# Patient Record
Sex: MALE | Race: WHITE | NOT HISPANIC OR LATINO | Employment: OTHER | URBAN - METROPOLITAN AREA
[De-identification: names, ages, dates, MRNs, and addresses within clinical notes are randomized per-mention and may not be internally consistent; named-entity substitution may affect disease eponyms.]

---

## 2023-11-22 DIAGNOSIS — N18.30 CHRONIC KIDNEY DISEASE, STAGE 3 UNSPECIFIED (MULTI): Primary | ICD-10-CM

## 2023-11-25 ENCOUNTER — LAB (OUTPATIENT)
Dept: LAB | Facility: LAB | Age: 63
End: 2023-11-25
Payer: COMMERCIAL

## 2023-11-25 DIAGNOSIS — N18.30 CHRONIC KIDNEY DISEASE, STAGE 3 UNSPECIFIED (MULTI): ICD-10-CM

## 2023-11-25 LAB
ALBUMIN SERPL-MCNC: 3.7 G/DL (ref 3.5–5)
ANION GAP SERPL CALC-SCNC: 12 MMOL/L
BASOPHILS # BLD AUTO: 0.09 X10*3/UL (ref 0–0.1)
BASOPHILS NFR BLD AUTO: 1.3 %
BUN SERPL-MCNC: 51 MG/DL (ref 8–25)
CALCIUM SERPL-MCNC: 9.1 MG/DL (ref 8.5–10.4)
CHLORIDE SERPL-SCNC: 110 MMOL/L (ref 97–107)
CO2 SERPL-SCNC: 22 MMOL/L (ref 24–31)
CREAT SERPL-MCNC: 1.8 MG/DL (ref 0.4–1.6)
EOSINOPHIL # BLD AUTO: 0.19 X10*3/UL (ref 0–0.7)
EOSINOPHIL NFR BLD AUTO: 2.7 %
ERYTHROCYTE [DISTWIDTH] IN BLOOD BY AUTOMATED COUNT: 14.1 % (ref 11.5–14.5)
GFR SERPL CREATININE-BSD FRML MDRD: 42 ML/MIN/1.73M*2
GLUCOSE SERPL-MCNC: 115 MG/DL (ref 65–99)
HCT VFR BLD AUTO: 43 % (ref 41–52)
HGB BLD-MCNC: 13.6 G/DL (ref 13.5–17.5)
IMM GRANULOCYTES # BLD AUTO: 0.19 X10*3/UL (ref 0–0.7)
IMM GRANULOCYTES NFR BLD AUTO: 2.7 % (ref 0–0.9)
LYMPHOCYTES # BLD AUTO: 1.39 X10*3/UL (ref 1.2–4.8)
LYMPHOCYTES NFR BLD AUTO: 19.6 %
MCH RBC QN AUTO: 29.4 PG (ref 26–34)
MCHC RBC AUTO-ENTMCNC: 31.6 G/DL (ref 32–36)
MCV RBC AUTO: 93 FL (ref 80–100)
MONOCYTES # BLD AUTO: 0.56 X10*3/UL (ref 0.1–1)
MONOCYTES NFR BLD AUTO: 7.9 %
NEUTROPHILS # BLD AUTO: 4.66 X10*3/UL (ref 1.2–7.7)
NEUTROPHILS NFR BLD AUTO: 65.8 %
NRBC BLD-RTO: 0 /100 WBCS (ref 0–0)
PHOSPHATE SERPL-MCNC: 3.5 MG/DL (ref 2.5–4.5)
PLATELET # BLD AUTO: 209 X10*3/UL (ref 150–450)
POTASSIUM SERPL-SCNC: 5.1 MMOL/L (ref 3.4–5.1)
RBC # BLD AUTO: 4.62 X10*6/UL (ref 4.5–5.9)
SODIUM SERPL-SCNC: 144 MMOL/L (ref 133–145)
WBC # BLD AUTO: 7.1 X10*3/UL (ref 4.4–11.3)

## 2023-11-25 PROCEDURE — 80069 RENAL FUNCTION PANEL: CPT

## 2023-11-25 PROCEDURE — 83970 ASSAY OF PARATHORMONE: CPT

## 2023-11-25 PROCEDURE — 85025 COMPLETE CBC W/AUTO DIFF WBC: CPT

## 2023-11-25 PROCEDURE — 36415 COLL VENOUS BLD VENIPUNCTURE: CPT

## 2023-11-26 LAB — PTH-INTACT SERPL-MCNC: 117.2 PG/ML (ref 18.5–88)

## 2024-06-26 ENCOUNTER — APPOINTMENT (OUTPATIENT)
Dept: DERMATOLOGY | Facility: CLINIC | Age: 64
End: 2024-06-26
Payer: COMMERCIAL

## 2024-06-26 DIAGNOSIS — L81.4 LENTIGO: ICD-10-CM

## 2024-06-26 DIAGNOSIS — L82.1 SEBORRHEIC KERATOSIS: ICD-10-CM

## 2024-06-26 DIAGNOSIS — D48.5 NEOPLASM OF UNCERTAIN BEHAVIOR OF SKIN: Primary | ICD-10-CM

## 2024-06-26 DIAGNOSIS — L57.9 SKIN CHANGES DUE TO CHRONIC EXPOSURE TO NONIONIZING RADIATION: ICD-10-CM

## 2024-06-26 DIAGNOSIS — D22.9 BENIGN NEVUS: ICD-10-CM

## 2024-06-26 DIAGNOSIS — L73.8 SEBACEOUS HYPERPLASIA OF FACE: ICD-10-CM

## 2024-06-26 PROCEDURE — 11102 TANGNTL BX SKIN SINGLE LES: CPT | Performed by: NURSE PRACTITIONER

## 2024-06-26 PROCEDURE — 99213 OFFICE O/P EST LOW 20 MIN: CPT | Performed by: NURSE PRACTITIONER

## 2024-06-26 PROCEDURE — 1036F TOBACCO NON-USER: CPT | Performed by: NURSE PRACTITIONER

## 2024-06-26 RX ORDER — CARVEDILOL 6.25 MG/1
6.25 TABLET ORAL
COMMUNITY

## 2024-06-26 RX ORDER — FUROSEMIDE 40 MG/1
1 TABLET ORAL
COMMUNITY
Start: 2024-04-20

## 2024-06-26 RX ORDER — PREDNISONE 20 MG/1
TABLET ORAL
COMMUNITY
Start: 2021-09-28

## 2024-06-26 RX ORDER — NAPROXEN SODIUM 220 MG/1
TABLET, FILM COATED ORAL EVERY 24 HOURS
COMMUNITY

## 2024-06-26 RX ORDER — ALBUTEROL SULFATE 90 UG/1
2 AEROSOL, METERED RESPIRATORY (INHALATION) EVERY 4 HOURS PRN
COMMUNITY

## 2024-06-26 RX ORDER — KETOCONAZOLE 20 MG/G
CREAM TOPICAL
COMMUNITY
Start: 2021-05-03

## 2024-06-26 RX ORDER — POLYETHYLENE GLYCOL 3350 17 G/17G
17 POWDER, FOR SOLUTION ORAL
COMMUNITY
Start: 2023-02-28

## 2024-06-26 RX ORDER — TRIAMCINOLONE ACETONIDE 1 MG/G
CREAM TOPICAL
COMMUNITY
Start: 2022-12-20

## 2024-06-26 RX ORDER — BUDESONIDE AND FORMOTEROL FUMARATE 160; 4.5 UG/1; UG/1
2 AEROSOL, METERED RESPIRATORY (INHALATION) 2 TIMES DAILY
COMMUNITY
Start: 2024-05-31

## 2024-06-26 RX ORDER — SIMVASTATIN 40 MG/1
TABLET, FILM COATED ORAL EVERY 24 HOURS
COMMUNITY

## 2024-06-26 RX ORDER — MOMETASONE FUROATE AND FORMOTEROL FUMARATE DIHYDRATE 200; 5 UG/1; UG/1
2 AEROSOL RESPIRATORY (INHALATION) 2 TIMES DAILY
COMMUNITY
Start: 2023-10-29

## 2024-06-26 RX ORDER — TERBINAFINE HYDROCHLORIDE 250 MG/1
TABLET ORAL
COMMUNITY
Start: 2021-05-03

## 2024-06-26 NOTE — PROGRESS NOTES
Subjective     Colten Eason is a 63 y.o. male who presents for the following: Skin Check.     Review of Systems:  No other skin or systemic complaints other than what is documented elsewhere in the note.    The following portions of the chart were reviewed this encounter and updated as appropriate:   Tobacco  Allergies  Meds  Problems  Med Hx  Surg Hx         Skin Cancer History  No skin cancer on file.      Specialty Problems    None       Objective   Well appearing patient in no apparent distress; mood and affect are within normal limits.    A focused skin examination was performed neck up. All findings within normal limits unless otherwise noted below.    Assessment/Plan   1. Neoplasm of uncertain behavior of skin  Right Parotid Area  6 mm erythematous crusty papule              Lesion biopsy  Type of biopsy: tangential    Informed consent: discussed and consent obtained    Timeout: patient name, date of birth, surgical site, and procedure verified    Procedure prep:  Patient was prepped and draped  Anesthesia: the lesion was anesthetized in a standard fashion    Anesthetic:  1% lidocaine plain local infiltration  Instrument used: DermaBlade    Hemostasis achieved with: electrodesiccation    Outcome: patient tolerated procedure well    Post-procedure details: wound care instructions given    Additional details:  Cleaned area with isopropyl alcohol prior to anesthesia or biopsy. Applied thin layer of vaseline and covered with bandaid after procedure      Staff Communication: Dermatology Local Anesthesia: 1 % Plain Lidocaine - Amount: 0.5 ml    Specimen 1 - Dermatopathology- DERM LAB  Differential Diagnosis: NMSC vs ISK  Check Margins Yes/No?:    Comments:    Dermpath Lab: Routine Histopathology (formalin-fixed tissue)    NUB  - Given uncertainty in clinical diagnosis, shave biopsy is recommended in clinic today.  - The patient expressed understanding, is in agreement with this plan, and wishes to proceed  with biopsy.  - Oral and written wound care instructions provided.  - Advised patient that the office will call within 2 weeks to discuss biopsy results.      2. Benign nevus  Scattered, uniform and benign-appearing, regular brown melanocytic papules and macules.    The present appearance of the lesion is not worrisome but it should continue to be observed and testing/treatment may be warranted if change occurs.    3. Seborrheic keratosis  Stuck on verrucous, tan-brown papules and plaques.      Seborrheic keratoses are common noncancerous (benign) growths of unknown cause seen in adults due to a thickening of an area of the top skin layer. Seborrheic keratoses may appear as if they are stuck on to the skin. They have distinct borders, and they may appear as papules (small, solid bumps) or plaques (solid, raised patches that are bigger than a thumbnail). They may be the same color as your skin, or they may be pink, light brown, darker brown, or very dark brown, or sometimes may appear black.    There is no way to prevent new seborrheic keratoses from forming. Seborrheic keratoses can be removed, but removal is considered a cosmetic issue and is usually not covered by insurance.    PLAN  No treatment is needed unless there is irritation from clothing, such as itching or bleeding.  2.   Some lotions containing alpha hydroxy acids, salicylic acid, or urea may make the areas feel smoother with regular use but will not eliminate them.    4. Sebaceous hyperplasia of face  Head - Anterior (Face)  Small yellow, lobulated papules with a central dell.    Sebaceous hyperplasia is a common harmless enlargement of the skin oil glands.    Many types of treatment can remove the lesions, with a small risk of leaving scars:  Burning (cautery)  Freezing (cryosurgery)  Applying topical chemicals  Applying a drug activated by light (photodynamic therapy)  Laser treatment  Cutting out the lesions (excision)    The present appearance of  the lesion is not worrisome but it should continue to be observed and testing/treatment may be warranted if change occurs.    5. Lentigo  Scattered tan macules in sun-exposed areas.    A solar lentigo (plural, solar lentigines), also known as a sun-induced freckle or senile lentigo, is a dark (hyperpigmented) lesion caused by natural or artificial ultraviolet (UV) light. Solar lentigines may be single or multiple. This type of lentigo is different from a simple lentigo (lentigo simplex) because it is caused by exposure to UV light. Solar lentigines are benign, but they do indicate excessive sun exposure, a risk factor for the development of skin cancer.    To prevent solar lentigines, avoid exposure to sunlight in midday (10 AM to 3 PM), wear sun-protective clothing (tightly woven clothes and hats), and apply sunscreen (SPF 30 UVA and UVB block).    The present appearance of the lesion is not worrisome but it should continue to be observed and testing/treatment may be warranted if change occurs.    6. Skin changes due to chronic exposure to nonionizing radiation  Actinic changes in the form of freckles, lentigines and hyper/hypopigmentation     ABCDEs of melanoma and atypical moles were discussed with the patient.    Patient was instructed to perform monthly self skin examination.  We recommended that the patient have regular full skin exams given an increased risk of subsequent skin cancers.    The patient was instructed to use sun protective behaviors including use of broad spectrum sunscreens and sun protective clothing to reduce risk of skin cancers.    Warning signs of non-melanoma skin cancer discussed.        Return to clinic to be determined. Will call with results

## 2024-06-26 NOTE — PATIENT INSTRUCTIONS

## 2024-06-28 ENCOUNTER — TELEPHONE (OUTPATIENT)
Dept: DERMATOLOGY | Facility: CLINIC | Age: 64
End: 2024-06-28
Payer: COMMERCIAL

## 2024-06-28 LAB
LABORATORY COMMENT REPORT: NORMAL
PATH REPORT.FINAL DX SPEC: NORMAL
PATH REPORT.GROSS SPEC: NORMAL
PATH REPORT.MICROSCOPIC SPEC OTHER STN: NORMAL
PATH REPORT.RELEVANT HX SPEC: NORMAL
PATH REPORT.TOTAL CANCER: NORMAL

## 2024-06-28 NOTE — TELEPHONE ENCOUNTER
Benign. No further treatment required. Return to clinic in 1 year for routine skin check or sooner if needed.

## 2024-08-22 DIAGNOSIS — N18.32 CHRONIC KIDNEY DISEASE, STAGE 3B (MULTI): Primary | ICD-10-CM

## 2024-08-24 ENCOUNTER — LAB (OUTPATIENT)
Dept: LAB | Facility: LAB | Age: 64
End: 2024-08-24
Payer: COMMERCIAL

## 2024-08-24 DIAGNOSIS — N18.32 CHRONIC KIDNEY DISEASE, STAGE 3B (MULTI): ICD-10-CM

## 2024-08-24 LAB
ALBUMIN SERPL-MCNC: 3.8 G/DL (ref 3.5–5)
ANION GAP SERPL CALC-SCNC: 13 MMOL/L
BUN SERPL-MCNC: 63 MG/DL (ref 8–25)
CALCIUM SERPL-MCNC: 9.3 MG/DL (ref 8.5–10.4)
CHLORIDE SERPL-SCNC: 107 MMOL/L (ref 97–107)
CO2 SERPL-SCNC: 22 MMOL/L (ref 24–31)
CREAT SERPL-MCNC: 2.1 MG/DL (ref 0.4–1.6)
EGFRCR SERPLBLD CKD-EPI 2021: 35 ML/MIN/1.73M*2
ERYTHROCYTE [DISTWIDTH] IN BLOOD BY AUTOMATED COUNT: 13.8 % (ref 11.5–14.5)
GLUCOSE SERPL-MCNC: 140 MG/DL (ref 65–99)
HCT VFR BLD AUTO: 42 % (ref 41–52)
HGB BLD-MCNC: 13.3 G/DL (ref 13.5–17.5)
MCH RBC QN AUTO: 29.2 PG (ref 26–34)
MCHC RBC AUTO-ENTMCNC: 31.7 G/DL (ref 32–36)
MCV RBC AUTO: 92 FL (ref 80–100)
NRBC BLD-RTO: 0 /100 WBCS (ref 0–0)
PHOSPHATE SERPL-MCNC: 3.2 MG/DL (ref 2.5–4.5)
PLATELET # BLD AUTO: 200 X10*3/UL (ref 150–450)
POTASSIUM SERPL-SCNC: 4.2 MMOL/L (ref 3.4–5.1)
PTH-INTACT SERPL-MCNC: 126.5 PG/ML (ref 18.5–88)
RBC # BLD AUTO: 4.55 X10*6/UL (ref 4.5–5.9)
SODIUM SERPL-SCNC: 142 MMOL/L (ref 133–145)
WBC # BLD AUTO: 6.4 X10*3/UL (ref 4.4–11.3)

## 2024-08-24 PROCEDURE — 83970 ASSAY OF PARATHORMONE: CPT

## 2024-08-24 PROCEDURE — 36415 COLL VENOUS BLD VENIPUNCTURE: CPT

## 2024-08-24 PROCEDURE — 85027 COMPLETE CBC AUTOMATED: CPT

## 2024-08-24 PROCEDURE — 80069 RENAL FUNCTION PANEL: CPT

## 2025-02-11 ENCOUNTER — OFFICE VISIT (OUTPATIENT)
Dept: URGENT CARE | Age: 65
End: 2025-02-11
Payer: COMMERCIAL

## 2025-02-11 DIAGNOSIS — J40 BRONCHITIS: Primary | ICD-10-CM

## 2025-02-11 RX ORDER — AZITHROMYCIN 250 MG/1
TABLET, FILM COATED ORAL
Qty: 6 TABLET | Refills: 0 | Status: SHIPPED | OUTPATIENT
Start: 2025-02-11

## 2025-02-11 RX ORDER — PREDNISONE 20 MG/1
20 TABLET ORAL DAILY
Qty: 10 TABLET | Refills: 0 | Status: SHIPPED | OUTPATIENT
Start: 2025-02-11 | End: 2025-02-21

## 2025-02-12 NOTE — PATIENT INSTRUCTIONS
Antibiotics and cough tablets were called into pharmacy take as directed. Follow-up with primary care doctor on an outpatient basis. If you are not getting any better after the medications you may need a chest x-ray call your primary care doctor to have one scheduled. Any worsening symptoms such as chest pain or shortness of breath go directly to the ER.

## 2025-02-12 NOTE — PROGRESS NOTES
Urgent Care Virtual Video Visit    Patient Location: [facility]  Provider Location: [facility]    Video visit completed with realtime synchronous video/audio connection. Informed consent was obtained from the patient. Patient was made aware that my evaluation and diagnosis are limited due to the fact that we are not in the same room during the interview and that this is a virtual encounter that took place via videoconferencing. Patient verbalized understanding.     HPI:  Pt is a 64 yr old male who presents with cough and congestion since Saturday he has COPD and he usually runs 95% pulse ox but is around 93%.  He denies sob or chest pain.  He has a lung specialist but couldn't get into  see him.  He was neg covid at home    ALLERGIES / ADVERSE REACTIONS:  Allergies: No known allergies noted by patient.  MEDICATIONS:  Current Medications: inhalers, BP meds  PROBLEMS:  Major or Chronic Illnesses: COPD, HTN  Family Hx: none pertinent to this visit  Social Hx: no smoking, no drug use, no alcohol     ROS:  Gen: No fatigue, No fever, sweats.  Head: No headache, trauma.  Eyes: No vision loss, double vision, drainage, eye pain.  ENT: No hearing changes, pain, epistaxis, No congestion  Cardiac: No chest pain  Pulmonary: No shortness of breath, no pleuritic pain, + cough  Heme/lymph: No swollen glands  GI: No abdominal pain, nausea, no vomiting, diarrhea  : No dysuria, frequency, urgency, hematuria  Musculoskeletal: No limb pain, joint pain, back pain, joint swelling or stiffness. No myalgia  Skin: No rashes, pruritus, lumps, lesions.  Neuro: No Numbness, tingling, or weakness.  Psych: No anxiety     Review of systems is otherwise negative unless stated above or in history of present illness.     Physical exam:   (limited due to virtual visit)  General:  Pt is alert, no acute distress  HENMT: PERRL, EOMs intact, Conjunctiva pink with no erythema or exudates. No rhinorrhea and rhinitis  Resp: Respiratory  effort is normal, no retractions, no stridor. No cough  Skel: full range of motion of upper and lower extremities.  Neuro: Normal gait, CN II-XII intact, no motor or sensory changes.  Psych: Alert and oriented ×3, judgment is appropriate, normal mood and affect     MDM:     History and physical exam consistent with bronchitis. No evidence of pneumonia, sepsis or other acute cardiopulmonary process.  No X-ray, labs, or further work-up warranted at this time.  Pt will be treated with abx and prednisone, and Mucinex DM due to persistent cough and smoking status.  Return to the office with any new or worsening symptoms.  Patient agreeable with plan and verbalized understanding.      DX:  bronchitis  Treatment: z-pack/prednisone  Discharge instructions: Antibiotics and steroids were called into pharmacy take as directed. Follow-up with primary care doctor on an outpatient basis. If you are not getting any better after the medications you may need a chest x-ray call your primary care doctor to have one scheduled. Any worsening symptoms such as chest pain or shortness of breath go directly to the ER.  Patient disposition: Home    Electronically signed by PETRONA Dubose  7:11 PM

## 2025-02-16 ENCOUNTER — APPOINTMENT (OUTPATIENT)
Dept: CARDIOLOGY | Facility: HOSPITAL | Age: 65
End: 2025-02-16
Payer: COMMERCIAL

## 2025-02-16 ENCOUNTER — APPOINTMENT (OUTPATIENT)
Dept: URGENT CARE | Age: 65
End: 2025-02-16
Payer: COMMERCIAL

## 2025-02-16 ENCOUNTER — APPOINTMENT (OUTPATIENT)
Dept: RADIOLOGY | Facility: HOSPITAL | Age: 65
End: 2025-02-16
Payer: COMMERCIAL

## 2025-02-16 ENCOUNTER — HOSPITAL ENCOUNTER (EMERGENCY)
Facility: HOSPITAL | Age: 65
Discharge: HOME | End: 2025-02-16
Attending: EMERGENCY MEDICINE
Payer: COMMERCIAL

## 2025-02-16 VITALS
TEMPERATURE: 97.8 F | HEART RATE: 65 BPM | OXYGEN SATURATION: 92 % | RESPIRATION RATE: 16 BRPM | SYSTOLIC BLOOD PRESSURE: 215 MMHG | BODY MASS INDEX: 43.35 KG/M2 | HEIGHT: 68 IN | WEIGHT: 286 LBS | DIASTOLIC BLOOD PRESSURE: 116 MMHG

## 2025-02-16 DIAGNOSIS — J44.1 COPD EXACERBATION (MULTI): Primary | ICD-10-CM

## 2025-02-16 LAB
ANION GAP SERPL CALC-SCNC: 10 MMOL/L (ref 10–20)
BASOPHILS # BLD AUTO: 0.04 X10*3/UL (ref 0–0.1)
BASOPHILS NFR BLD AUTO: 0.5 %
BUN SERPL-MCNC: 45 MG/DL (ref 6–23)
CALCIUM SERPL-MCNC: 8.6 MG/DL (ref 8.6–10.3)
CHLORIDE SERPL-SCNC: 113 MMOL/L (ref 98–107)
CO2 SERPL-SCNC: 28 MMOL/L (ref 21–32)
CREAT SERPL-MCNC: 2.04 MG/DL (ref 0.5–1.3)
EGFRCR SERPLBLD CKD-EPI 2021: 36 ML/MIN/1.73M*2
EOSINOPHIL # BLD AUTO: 0.02 X10*3/UL (ref 0–0.7)
EOSINOPHIL NFR BLD AUTO: 0.3 %
ERYTHROCYTE [DISTWIDTH] IN BLOOD BY AUTOMATED COUNT: 13.8 % (ref 11.5–14.5)
FLUAV RNA RESP QL NAA+PROBE: NOT DETECTED
FLUBV RNA RESP QL NAA+PROBE: NOT DETECTED
GLUCOSE SERPL-MCNC: 117 MG/DL (ref 74–99)
HCT VFR BLD AUTO: 41.5 % (ref 41–52)
HGB BLD-MCNC: 13.7 G/DL (ref 13.5–17.5)
IMM GRANULOCYTES # BLD AUTO: 0.29 X10*3/UL (ref 0–0.7)
IMM GRANULOCYTES NFR BLD AUTO: 3.7 % (ref 0–0.9)
LYMPHOCYTES # BLD AUTO: 0.82 X10*3/UL (ref 1.2–4.8)
LYMPHOCYTES NFR BLD AUTO: 10.4 %
MCH RBC QN AUTO: 29.9 PG (ref 26–34)
MCHC RBC AUTO-ENTMCNC: 33 G/DL (ref 32–36)
MCV RBC AUTO: 91 FL (ref 80–100)
MONOCYTES # BLD AUTO: 0.37 X10*3/UL (ref 0.1–1)
MONOCYTES NFR BLD AUTO: 4.7 %
NEUTROPHILS # BLD AUTO: 6.32 X10*3/UL (ref 1.2–7.7)
NEUTROPHILS NFR BLD AUTO: 80.4 %
NRBC BLD-RTO: 0 /100 WBCS (ref 0–0)
PLATELET # BLD AUTO: 205 X10*3/UL (ref 150–450)
POTASSIUM SERPL-SCNC: 4.8 MMOL/L (ref 3.5–5.3)
RBC # BLD AUTO: 4.58 X10*6/UL (ref 4.5–5.9)
SARS-COV-2 RNA RESP QL NAA+PROBE: NOT DETECTED
SODIUM SERPL-SCNC: 146 MMOL/L (ref 136–145)
WBC # BLD AUTO: 7.9 X10*3/UL (ref 4.4–11.3)

## 2025-02-16 PROCEDURE — 2500000001 HC RX 250 WO HCPCS SELF ADMINISTERED DRUGS (ALT 637 FOR MEDICARE OP): Performed by: EMERGENCY MEDICINE

## 2025-02-16 PROCEDURE — 85025 COMPLETE CBC W/AUTO DIFF WBC: CPT | Performed by: STUDENT IN AN ORGANIZED HEALTH CARE EDUCATION/TRAINING PROGRAM

## 2025-02-16 PROCEDURE — 2500000004 HC RX 250 GENERAL PHARMACY W/ HCPCS (ALT 636 FOR OP/ED): Performed by: EMERGENCY MEDICINE

## 2025-02-16 PROCEDURE — 93005 ELECTROCARDIOGRAM TRACING: CPT

## 2025-02-16 PROCEDURE — 71046 X-RAY EXAM CHEST 2 VIEWS: CPT | Performed by: RADIOLOGY

## 2025-02-16 PROCEDURE — 71250 CT THORAX DX C-: CPT | Performed by: RADIOLOGY

## 2025-02-16 PROCEDURE — 2500000002 HC RX 250 W HCPCS SELF ADMINISTERED DRUGS (ALT 637 FOR MEDICARE OP, ALT 636 FOR OP/ED): Performed by: EMERGENCY MEDICINE

## 2025-02-16 PROCEDURE — 71250 CT THORAX DX C-: CPT

## 2025-02-16 PROCEDURE — 87636 SARSCOV2 & INF A&B AMP PRB: CPT | Performed by: EMERGENCY MEDICINE

## 2025-02-16 PROCEDURE — 85025 COMPLETE CBC W/AUTO DIFF WBC: CPT | Performed by: EMERGENCY MEDICINE

## 2025-02-16 PROCEDURE — 36415 COLL VENOUS BLD VENIPUNCTURE: CPT | Performed by: STUDENT IN AN ORGANIZED HEALTH CARE EDUCATION/TRAINING PROGRAM

## 2025-02-16 PROCEDURE — 80048 BASIC METABOLIC PNL TOTAL CA: CPT | Performed by: EMERGENCY MEDICINE

## 2025-02-16 PROCEDURE — 99285 EMERGENCY DEPT VISIT HI MDM: CPT | Mod: 25 | Performed by: EMERGENCY MEDICINE

## 2025-02-16 PROCEDURE — 80048 BASIC METABOLIC PNL TOTAL CA: CPT | Performed by: STUDENT IN AN ORGANIZED HEALTH CARE EDUCATION/TRAINING PROGRAM

## 2025-02-16 PROCEDURE — 71046 X-RAY EXAM CHEST 2 VIEWS: CPT

## 2025-02-16 PROCEDURE — 94640 AIRWAY INHALATION TREATMENT: CPT

## 2025-02-16 RX ORDER — DOXYCYCLINE HYCLATE 100 MG
100 TABLET ORAL ONCE
Status: COMPLETED | OUTPATIENT
Start: 2025-02-16 | End: 2025-02-16

## 2025-02-16 RX ORDER — PREDNISONE 20 MG/1
60 TABLET ORAL DAILY
Qty: 12 TABLET | Refills: 0 | Status: SHIPPED | OUTPATIENT
Start: 2025-02-16 | End: 2025-02-20

## 2025-02-16 RX ORDER — PREDNISONE 20 MG/1
40 TABLET ORAL ONCE
Status: COMPLETED | OUTPATIENT
Start: 2025-02-16 | End: 2025-02-16

## 2025-02-16 RX ORDER — IPRATROPIUM BROMIDE AND ALBUTEROL SULFATE 2.5; .5 MG/3ML; MG/3ML
3 SOLUTION RESPIRATORY (INHALATION) EVERY 20 MIN
Status: DISPENSED | OUTPATIENT
Start: 2025-02-16 | End: 2025-02-16

## 2025-02-16 RX ORDER — DOXYCYCLINE 100 MG/1
100 TABLET ORAL 2 TIMES DAILY
Qty: 10 TABLET | Refills: 0 | Status: SHIPPED | OUTPATIENT
Start: 2025-02-16 | End: 2025-02-21

## 2025-02-16 RX ORDER — ALBUTEROL SULFATE 90 UG/1
2 INHALANT RESPIRATORY (INHALATION) EVERY 6 HOURS PRN
Qty: 8 G | Refills: 0 | Status: SHIPPED | OUTPATIENT
Start: 2025-02-16

## 2025-02-16 RX ADMIN — IPRATROPIUM BROMIDE AND ALBUTEROL SULFATE 3 ML: 2.5; .5 SOLUTION RESPIRATORY (INHALATION) at 20:20

## 2025-02-16 RX ADMIN — IPRATROPIUM BROMIDE AND ALBUTEROL SULFATE 3 ML: 2.5; .5 SOLUTION RESPIRATORY (INHALATION) at 20:28

## 2025-02-16 RX ADMIN — PREDNISONE 40 MG: 20 TABLET ORAL at 20:18

## 2025-02-16 RX ADMIN — DOXYCYCLINE HYCLATE 100 MG: 100 TABLET, COATED ORAL at 23:20

## 2025-02-16 ASSESSMENT — PAIN DESCRIPTION - PROGRESSION: CLINICAL_PROGRESSION: NOT CHANGED

## 2025-02-16 ASSESSMENT — COLUMBIA-SUICIDE SEVERITY RATING SCALE - C-SSRS
2. HAVE YOU ACTUALLY HAD ANY THOUGHTS OF KILLING YOURSELF?: NO
1. IN THE PAST MONTH, HAVE YOU WISHED YOU WERE DEAD OR WISHED YOU COULD GO TO SLEEP AND NOT WAKE UP?: NO
6. HAVE YOU EVER DONE ANYTHING, STARTED TO DO ANYTHING, OR PREPARED TO DO ANYTHING TO END YOUR LIFE?: NO

## 2025-02-16 ASSESSMENT — PAIN - FUNCTIONAL ASSESSMENT: PAIN_FUNCTIONAL_ASSESSMENT: 0-10

## 2025-02-16 ASSESSMENT — PAIN SCALES - GENERAL: PAINLEVEL_OUTOF10: 0 - NO PAIN

## 2025-02-16 NOTE — ED TRIAGE NOTES
Patient presents to ED from home for SOB and congestion that started 2 weeks ago. Patient was given steroids and a z-pack with no relief from UC on Monday.

## 2025-02-17 LAB
ATRIAL RATE: 63 BPM
P AXIS: 44 DEGREES
P OFFSET: 174 MS
P ONSET: 118 MS
PR INTERVAL: 160 MS
Q ONSET: 198 MS
QRS COUNT: 11 BEATS
QRS DURATION: 126 MS
QT INTERVAL: 422 MS
QTC CALCULATION(BAZETT): 431 MS
QTC FREDERICIA: 429 MS
R AXIS: -60 DEGREES
T AXIS: 41 DEGREES
T OFFSET: 409 MS
VENTRICULAR RATE: 63 BPM

## 2025-02-17 NOTE — DISCHARGE INSTRUCTIONS
You are seen in the emergency department for shortness of breath and cough despite taking a Z-Tim and steroids at home.  Your symptoms are likely due to a COPD exacerbation. This may be from taking a lower dose of steroids at home. Your CT scan did not show any signs of pneumonia. We will switch you to a different antibiotic for your COPD and also increase your steroid dose to 60mg per day. Monitor symptoms closely.  Follow-up with your primary doctor and pulmonologist.  Return to the emergency department if you have worsening shortness of breath, fever, chills, chest pain, or any other concerning symptoms.

## 2025-02-17 NOTE — ED PROVIDER NOTES
Emergency Department Provider Note             History of Present Illness   CC: Shortness of Breath    History provided by: Patient  Limitations to History: None  External Records Reviewed: prior ED provider notes, clinic notes, discharge summaries    HPI:  Colten Eason is a 64 y.o. male with history including COPD presenting to the emergency department for shortness of breath, cough, chest congestion for the past week. States he was seen at urgent care and prescribed a z-juhi and prednisone which have not really helped. Denies fever, chills, hemoptysis, leg swelling, chest pain, GI symptoms.     Physical Exam   Triage vitals:  T 36.8 °C (98.2 °F)  HR 69  BP (!) 151/107  RR 20  O2 94 % None (Room air)    General: awake, well-appearing, no distress  Head: normocephalic, atraumatic  Eyes: pupils equal, extraocular movements grossly intact, no conjunctival injection or scleral icterus  ENT: nares patent, moist mucous membranes  Neck: supple, trachea midline, no masses  CV: regular rate and rhythm, well-perfused  Resp: diminished breath sounds with faint end expiratory wheezing bilaterally. No increased work of breathing.  GI: soft, non-distended, non-tender, no rebound or guarding  Extremities: no edema, no gross deformity   Neuro: alert, oriented, speech is fluent, face is symmetric, moving all extremities   Psych: Appropriate mood and affect    ED Course & Medical Decision Making     64 y.o. male with history including COPD presenting to the emergency department for shortness of breath, cough, chest congestion for the past week, not improved with a z-juhi and prednisone. He's not in any distress and speaking in full sentences but has diminished breath sounds with expiratory wheezing. He appears euvolemic. He was given duonebs and 40mg prednisone. Is on 20mg daily at home. Workup initiated, see ED course.     Social Determinants Limiting Care: None identified    EKG: See ED course.     Results: Independently  reviewed and interpreted by me. Please see ED course and [facility] for my full interpretation.     Chronic Medical Conditions Significantly Affecting Care: as per [facility]    Patient was discussed with the following consultants/services: None     Care Considerations: as per [facility]    ED Course as of 02/20/25 0722   Sun Feb 16, 2025   1934 Orders initiated in triage. The patient was moved back to the main ED at this time, at which point I assumed his care.     Labs are notable for mild hypernatremia/hyperchloremia. Relatively stable creatinine. I independently reviewed his chest x-ray which shows no acute process. Viral swabs are negative. Given his ongoing symptoms despite his inhaler, steroids, and zpak, will obtain CT to rule out an occult pneumonia.  [LM]   2252 EKG per my interpretation reveals normal sinus rhythm, rate 63, left axis deviation, nonspecific intraventricular block, no significant ischemic changes. This is similar to prior EKG.    CT shows no signs of pneumonia or other acute process. [LM]   2252 Results were discussed with the patient and his wife.  Of note, he is hypertensive but appears to be asymptomatic.  States he is on carvedilol and another blood pressure medication but did not take them this evening. He declined IV fluids for possible element of dehydration. Also declined additional breathing treatments. Lungs have improved air movement. He states he feels comfortable going home.  He was given prescriptions for albuterol, a higher dose of prednisone for 4 days, and doxycycline for suspected COPD exacerbation. Given return precautions and advised follow up with his PCP and pulmonologist.  [LM]      ED Course User Index  [LM] Maya Williamson MD         Diagnoses as of 02/20/25 0722   COPD exacerbation (Multi)       Disposition   Discharge    MD Maya Hinds MD  02/20/25 0722

## 2025-04-09 ENCOUNTER — APPOINTMENT (OUTPATIENT)
Dept: RADIOLOGY | Facility: HOSPITAL | Age: 65
DRG: 605 | End: 2025-04-09
Payer: COMMERCIAL

## 2025-04-09 ENCOUNTER — HOSPITAL ENCOUNTER (INPATIENT)
Facility: HOSPITAL | Age: 65
End: 2025-04-09
Attending: STUDENT IN AN ORGANIZED HEALTH CARE EDUCATION/TRAINING PROGRAM | Admitting: SURGERY
Payer: COMMERCIAL

## 2025-04-09 ENCOUNTER — APPOINTMENT (OUTPATIENT)
Dept: RADIOLOGY | Facility: HOSPITAL | Age: 65
End: 2025-04-09
Payer: COMMERCIAL

## 2025-04-09 DIAGNOSIS — S80.11XA HEMATOMA OF RIGHT LOWER EXTREMITY, INITIAL ENCOUNTER: ICD-10-CM

## 2025-04-09 DIAGNOSIS — N18.31 STAGE 3A CHRONIC KIDNEY DISEASE (MULTI): ICD-10-CM

## 2025-04-09 DIAGNOSIS — T14.8XXA HEMATOMA: ICD-10-CM
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
## 2025-04-09 LAB
ALBUMIN SERPL BCP-MCNC: 3.2 G/DL (ref 3.4–5)
ALP SERPL-CCNC: 47 U/L (ref 33–136)
ALT SERPL W P-5'-P-CCNC: 18 U/L (ref 10–52)
ANION GAP SERPL CALCULATED.3IONS-SCNC: 10 MMOL/L (ref 10–20)
APPEARANCE UR: CLEAR
APTT PPP: 24.1 SECONDS (ref 22–32.5)
AST SERPL W P-5'-P-CCNC: 13 U/L (ref 9–39)
BASOPHILS # BLD AUTO: 0.05 X10*3/UL (ref 0–0.1)
BASOPHILS NFR BLD AUTO: 0.6 %
BILIRUB SERPL-MCNC: 0.3 MG/DL (ref 0–1.2)
BILIRUB UR STRIP.AUTO-MCNC: NEGATIVE MG/DL
BUN SERPL-MCNC: 55 MG/DL (ref 6–23)
CALCIUM SERPL-MCNC: 8.2 MG/DL (ref 8.6–10.3)
CHLORIDE SERPL-SCNC: 111 MMOL/L (ref 98–107)
CO2 SERPL-SCNC: 24 MMOL/L (ref 21–32)
COLOR UR: COLORLESS
CREAT SERPL-MCNC: 2 MG/DL (ref 0.5–1.3)
EGFRCR SERPLBLD CKD-EPI 2021: 37 ML/MIN/1.73M*2
EOSINOPHIL # BLD AUTO: 0.07 X10*3/UL (ref 0–0.7)
EOSINOPHIL NFR BLD AUTO: 0.8 %
ERYTHROCYTE [DISTWIDTH] IN BLOOD BY AUTOMATED COUNT: 14 % (ref 11.5–14.5)
GLUCOSE SERPL-MCNC: 102 MG/DL (ref 74–99)
GLUCOSE UR STRIP.AUTO-MCNC: ABNORMAL MG/DL
HCT VFR BLD AUTO: 35.5 % (ref 41–52)
HGB BLD-MCNC: 11.6 G/DL (ref 13.5–17.5)
IMM GRANULOCYTES # BLD AUTO: 0.15 X10*3/UL (ref 0–0.7)
IMM GRANULOCYTES NFR BLD AUTO: 1.7 % (ref 0–0.9)
INR PPP: 1 (ref 0.9–1.2)
KETONES UR STRIP.AUTO-MCNC: NEGATIVE MG/DL
LEUKOCYTE ESTERASE UR QL STRIP.AUTO: NEGATIVE
LYMPHOCYTES # BLD AUTO: 1.45 X10*3/UL (ref 1.2–4.8)
LYMPHOCYTES NFR BLD AUTO: 16.3 %
MCH RBC QN AUTO: 30.4 PG (ref 26–34)
MCHC RBC AUTO-ENTMCNC: 32.7 G/DL (ref 32–36)
MCV RBC AUTO: 93 FL (ref 80–100)
MONOCYTES # BLD AUTO: 0.76 X10*3/UL (ref 0.1–1)
MONOCYTES NFR BLD AUTO: 8.5 %
MUCOUS THREADS #/AREA URNS AUTO: NORMAL /LPF
NEUTROPHILS # BLD AUTO: 6.44 X10*3/UL (ref 1.2–7.7)
NEUTROPHILS NFR BLD AUTO: 72.1 %
NITRITE UR QL STRIP.AUTO: NEGATIVE
NRBC BLD-RTO: 0 /100 WBCS (ref 0–0)
PH UR STRIP.AUTO: 6 [PH]
PLATELET # BLD AUTO: 204 X10*3/UL (ref 150–450)
POTASSIUM SERPL-SCNC: 4.3 MMOL/L (ref 3.5–5.3)
PROT SERPL-MCNC: 5.5 G/DL (ref 6.4–8.2)
PROT UR STRIP.AUTO-MCNC: ABNORMAL MG/DL
PROTHROMBIN TIME: 11.2 SECONDS (ref 9.3–12.7)
RBC # BLD AUTO: 3.81 X10*6/UL (ref 4.5–5.9)
RBC # UR STRIP.AUTO: ABNORMAL MG/DL
RBC #/AREA URNS AUTO: NORMAL /HPF
SODIUM SERPL-SCNC: 141 MMOL/L (ref 136–145)
SP GR UR STRIP.AUTO: 1.02
UROBILINOGEN UR STRIP.AUTO-MCNC: NORMAL MG/DL
WBC # BLD AUTO: 8.9 X10*3/UL (ref 4.4–11.3)
WBC #/AREA URNS AUTO: NORMAL /HPF

## 2025-04-09 PROCEDURE — 70450 CT HEAD/BRAIN W/O DYE: CPT | Performed by: RADIOLOGY

## 2025-04-09 PROCEDURE — 85025 COMPLETE CBC W/AUTO DIFF WBC: CPT | Performed by: PHYSICIAN ASSISTANT

## 2025-04-09 PROCEDURE — 96374 THER/PROPH/DIAG INJ IV PUSH: CPT

## 2025-04-09 PROCEDURE — 73700 CT LOWER EXTREMITY W/O DYE: CPT | Mod: RT

## 2025-04-09 PROCEDURE — 81001 URINALYSIS AUTO W/SCOPE: CPT | Performed by: PHYSICIAN ASSISTANT

## 2025-04-09 PROCEDURE — 99285 EMERGENCY DEPT VISIT HI MDM: CPT | Mod: 25 | Performed by: STUDENT IN AN ORGANIZED HEALTH CARE EDUCATION/TRAINING PROGRAM

## 2025-04-09 PROCEDURE — 90471 IMMUNIZATION ADMIN: CPT | Performed by: PHYSICIAN ASSISTANT

## 2025-04-09 PROCEDURE — 96376 TX/PRO/DX INJ SAME DRUG ADON: CPT

## 2025-04-09 PROCEDURE — 73552 X-RAY EXAM OF FEMUR 2/>: CPT | Mod: RT

## 2025-04-09 PROCEDURE — 70450 CT HEAD/BRAIN W/O DYE: CPT

## 2025-04-09 PROCEDURE — 2500000004 HC RX 250 GENERAL PHARMACY W/ HCPCS (ALT 636 FOR OP/ED): Performed by: STUDENT IN AN ORGANIZED HEALTH CARE EDUCATION/TRAINING PROGRAM

## 2025-04-09 PROCEDURE — 85730 THROMBOPLASTIN TIME PARTIAL: CPT | Performed by: PHYSICIAN ASSISTANT

## 2025-04-09 PROCEDURE — 2500000004 HC RX 250 GENERAL PHARMACY W/ HCPCS (ALT 636 FOR OP/ED): Performed by: PHYSICIAN ASSISTANT

## 2025-04-09 PROCEDURE — 2550000001 HC RX 255 CONTRASTS: Performed by: STUDENT IN AN ORGANIZED HEALTH CARE EDUCATION/TRAINING PROGRAM

## 2025-04-09 PROCEDURE — 73130 X-RAY EXAM OF HAND: CPT | Mod: RT

## 2025-04-09 PROCEDURE — 73700 CT LOWER EXTREMITY W/O DYE: CPT | Mod: RIGHT SIDE | Performed by: RADIOLOGY

## 2025-04-09 PROCEDURE — 73564 X-RAY EXAM KNEE 4 OR MORE: CPT | Mod: RT

## 2025-04-09 PROCEDURE — 85610 PROTHROMBIN TIME: CPT | Performed by: PHYSICIAN ASSISTANT

## 2025-04-09 PROCEDURE — 96361 HYDRATE IV INFUSION ADD-ON: CPT

## 2025-04-09 PROCEDURE — 90715 TDAP VACCINE 7 YRS/> IM: CPT | Performed by: PHYSICIAN ASSISTANT

## 2025-04-09 PROCEDURE — 36415 COLL VENOUS BLD VENIPUNCTURE: CPT | Performed by: PHYSICIAN ASSISTANT

## 2025-04-09 PROCEDURE — 73564 X-RAY EXAM KNEE 4 OR MORE: CPT | Mod: RIGHT SIDE | Performed by: RADIOLOGY

## 2025-04-09 PROCEDURE — 73706 CT ANGIO LWR EXTR W/O&W/DYE: CPT | Mod: RIGHT SIDE | Performed by: RADIOLOGY

## 2025-04-09 PROCEDURE — 73706 CT ANGIO LWR EXTR W/O&W/DYE: CPT | Mod: RT

## 2025-04-09 PROCEDURE — 73552 X-RAY EXAM OF FEMUR 2/>: CPT | Mod: RIGHT SIDE | Performed by: RADIOLOGY

## 2025-04-09 PROCEDURE — 80053 COMPREHEN METABOLIC PANEL: CPT | Performed by: PHYSICIAN ASSISTANT

## 2025-04-09 PROCEDURE — 73130 X-RAY EXAM OF HAND: CPT | Mod: RIGHT SIDE | Performed by: RADIOLOGY

## 2025-04-09 PROCEDURE — 2060000001 HC INTERMEDIATE ICU ROOM DAILY

## 2025-04-09 PROCEDURE — 96375 TX/PRO/DX INJ NEW DRUG ADDON: CPT

## 2025-04-09 RX ORDER — BUDESONIDE, GLYCOPYRROLATE, AND FORMOTEROL FUMARATE 160; 9; 4.8 UG/1; UG/1; UG/1
2 AEROSOL, METERED RESPIRATORY (INHALATION) 2 TIMES DAILY
Status: ON HOLD | COMMUNITY

## 2025-04-09 RX ORDER — MORPHINE SULFATE 2 MG/ML
2 INJECTION, SOLUTION INTRAMUSCULAR; INTRAVENOUS ONCE
Status: COMPLETED | OUTPATIENT
Start: 2025-04-09 | End: 2025-04-09

## 2025-04-09 RX ORDER — MORPHINE SULFATE 4 MG/ML
4 INJECTION, SOLUTION INTRAMUSCULAR; INTRAVENOUS ONCE
Status: COMPLETED | OUTPATIENT
Start: 2025-04-09 | End: 2025-04-09

## 2025-04-09 RX ORDER — MORPHINE SULFATE 4 MG/ML
4 INJECTION, SOLUTION INTRAMUSCULAR; INTRAVENOUS ONCE
Status: DISCONTINUED | OUTPATIENT
Start: 2025-04-09 | End: 2025-04-09

## 2025-04-09 RX ORDER — ONDANSETRON HYDROCHLORIDE 2 MG/ML
4 INJECTION, SOLUTION INTRAVENOUS ONCE
Status: COMPLETED | OUTPATIENT
Start: 2025-04-09 | End: 2025-04-09

## 2025-04-09 RX ADMIN — MORPHINE SULFATE 2 MG: 2 INJECTION, SOLUTION INTRAMUSCULAR; INTRAVENOUS at 20:57

## 2025-04-09 RX ADMIN — IOHEXOL 120 ML: 350 INJECTION, SOLUTION INTRAVENOUS at 18:09

## 2025-04-09 RX ADMIN — MORPHINE SULFATE 4 MG: 4 INJECTION, SOLUTION INTRAMUSCULAR; INTRAVENOUS at 12:53

## 2025-04-09 RX ADMIN — SODIUM CHLORIDE, SODIUM LACTATE, POTASSIUM CHLORIDE, AND CALCIUM CHLORIDE 250 ML: 600; 310; 30; 20 INJECTION, SOLUTION INTRAVENOUS at 18:13

## 2025-04-09 RX ADMIN — MORPHINE SULFATE 4 MG: 4 INJECTION, SOLUTION INTRAMUSCULAR; INTRAVENOUS at 17:05

## 2025-04-09 RX ADMIN — ONDANSETRON 4 MG: 2 INJECTION, SOLUTION INTRAMUSCULAR; INTRAVENOUS at 12:54

## 2025-04-09 RX ADMIN — TETANUS TOXOID, REDUCED DIPHTHERIA TOXOID AND ACELLULAR PERTUSSIS VACCINE, ADSORBED 0.5 ML: 5; 2.5; 8; 8; 2.5 SUSPENSION INTRAMUSCULAR at 18:13

## 2025-04-09 SDOH — SOCIAL STABILITY: SOCIAL INSECURITY
WITHIN THE LAST YEAR, HAVE YOU BEEN KICKED, HIT, SLAPPED, OR OTHERWISE PHYSICALLY HURT BY YOUR PARTNER OR EX-PARTNER?: NO

## 2025-04-09 SDOH — SOCIAL STABILITY: SOCIAL NETWORK: HOW OFTEN DO YOU ATTEND MEETINGS OF THE CLUBS OR ORGANIZATIONS YOU BELONG TO?: MORE THAN 4 TIMES PER YEAR

## 2025-04-09 SDOH — SOCIAL STABILITY: SOCIAL INSECURITY: WITHIN THE LAST YEAR, HAVE YOU BEEN AFRAID OF YOUR PARTNER OR EX-PARTNER?: NO

## 2025-04-09 SDOH — ECONOMIC STABILITY: FOOD INSECURITY: HOW HARD IS IT FOR YOU TO PAY FOR THE VERY BASICS LIKE FOOD, HOUSING, MEDICAL CARE, AND HEATING?: SOMEWHAT HARD

## 2025-04-09 SDOH — SOCIAL STABILITY: SOCIAL INSECURITY: DOES ANYONE TRY TO KEEP YOU FROM HAVING/CONTACTING OTHER FRIENDS OR DOING THINGS OUTSIDE YOUR HOME?: NO

## 2025-04-09 SDOH — SOCIAL STABILITY: SOCIAL NETWORK
IN A TYPICAL WEEK, HOW MANY TIMES DO YOU TALK ON THE PHONE WITH FAMILY, FRIENDS, OR NEIGHBORS?: MORE THAN THREE TIMES A WEEK

## 2025-04-09 SDOH — ECONOMIC STABILITY: TRANSPORTATION INSECURITY: IN THE PAST 12 MONTHS, HAS LACK OF TRANSPORTATION KEPT YOU FROM MEDICAL APPOINTMENTS OR FROM GETTING MEDICATIONS?: NO

## 2025-04-09 SDOH — HEALTH STABILITY: PHYSICAL HEALTH
HOW OFTEN DO YOU NEED TO HAVE SOMEONE HELP YOU WHEN YOU READ INSTRUCTIONS, PAMPHLETS, OR OTHER WRITTEN MATERIAL FROM YOUR DOCTOR OR PHARMACY?: NEVER

## 2025-04-09 SDOH — ECONOMIC STABILITY: FOOD INSECURITY: WITHIN THE PAST 12 MONTHS, THE FOOD YOU BOUGHT JUST DIDN'T LAST AND YOU DIDN'T HAVE MONEY TO GET MORE.: NEVER TRUE

## 2025-04-09 SDOH — SOCIAL STABILITY: SOCIAL INSECURITY: HAS ANYONE EVER THREATENED TO HURT YOUR FAMILY OR YOUR PETS?: NO

## 2025-04-09 SDOH — HEALTH STABILITY: MENTAL HEALTH: HOW OFTEN DO YOU HAVE SIX OR MORE DRINKS ON ONE OCCASION?: NEVER

## 2025-04-09 SDOH — SOCIAL STABILITY: SOCIAL INSECURITY: WITHIN THE LAST YEAR, HAVE YOU BEEN HUMILIATED OR EMOTIONALLY ABUSED IN OTHER WAYS BY YOUR PARTNER OR EX-PARTNER?: NO

## 2025-04-09 SDOH — ECONOMIC STABILITY: FOOD INSECURITY: WITHIN THE PAST 12 MONTHS, YOU WORRIED THAT YOUR FOOD WOULD RUN OUT BEFORE YOU GOT THE MONEY TO BUY MORE.: NEVER TRUE

## 2025-04-09 SDOH — SOCIAL STABILITY: SOCIAL INSECURITY: ARE YOU MARRIED, WIDOWED, DIVORCED, SEPARATED, NEVER MARRIED, OR LIVING WITH A PARTNER?: MARRIED

## 2025-04-09 SDOH — SOCIAL STABILITY: SOCIAL NETWORK: HOW OFTEN DO YOU ATTEND CHURCH OR RELIGIOUS SERVICES?: MORE THAN 4 TIMES PER YEAR

## 2025-04-09 SDOH — HEALTH STABILITY: PHYSICAL HEALTH: ON AVERAGE, HOW MANY DAYS PER WEEK DO YOU ENGAGE IN MODERATE TO STRENUOUS EXERCISE (LIKE A BRISK WALK)?: 0 DAYS

## 2025-04-09 SDOH — HEALTH STABILITY: MENTAL HEALTH
DO YOU FEEL STRESS - TENSE, RESTLESS, NERVOUS, OR ANXIOUS, OR UNABLE TO SLEEP AT NIGHT BECAUSE YOUR MIND IS TROUBLED ALL THE TIME - THESE DAYS?: NOT AT ALL

## 2025-04-09 SDOH — SOCIAL STABILITY: SOCIAL INSECURITY
WITHIN THE LAST YEAR, HAVE YOU BEEN RAPED OR FORCED TO HAVE ANY KIND OF SEXUAL ACTIVITY BY YOUR PARTNER OR EX-PARTNER?: NO

## 2025-04-09 SDOH — ECONOMIC STABILITY: INCOME INSECURITY: IN THE PAST 12 MONTHS HAS THE ELECTRIC, GAS, OIL, OR WATER COMPANY THREATENED TO SHUT OFF SERVICES IN YOUR HOME?: NO

## 2025-04-09 SDOH — SOCIAL STABILITY: SOCIAL NETWORK: HOW OFTEN DO YOU GET TOGETHER WITH FRIENDS OR RELATIVES?: THREE TIMES A WEEK

## 2025-04-09 SDOH — SOCIAL STABILITY: SOCIAL INSECURITY: DO YOU FEEL UNSAFE GOING BACK TO THE PLACE WHERE YOU ARE LIVING?: NO

## 2025-04-09 SDOH — SOCIAL STABILITY: SOCIAL INSECURITY: ARE YOU OR HAVE YOU BEEN THREATENED OR ABUSED PHYSICALLY, EMOTIONALLY, OR SEXUALLY BY ANYONE?: NO

## 2025-04-09 SDOH — SOCIAL STABILITY: SOCIAL NETWORK
DO YOU BELONG TO ANY CLUBS OR ORGANIZATIONS SUCH AS CHURCH GROUPS, UNIONS, FRATERNAL OR ATHLETIC GROUPS, OR SCHOOL GROUPS?: YES

## 2025-04-09 SDOH — SOCIAL STABILITY: SOCIAL INSECURITY: HAVE YOU HAD THOUGHTS OF HARMING ANYONE ELSE?: NO

## 2025-04-09 SDOH — ECONOMIC STABILITY: HOUSING INSECURITY: IN THE LAST 12 MONTHS, WAS THERE A TIME WHEN YOU WERE NOT ABLE TO PAY THE MORTGAGE OR RENT ON TIME?: NO

## 2025-04-09 SDOH — HEALTH STABILITY: PHYSICAL HEALTH: ON AVERAGE, HOW MANY MINUTES DO YOU ENGAGE IN EXERCISE AT THIS LEVEL?: 0 MIN

## 2025-04-09 SDOH — ECONOMIC STABILITY: HOUSING INSECURITY: IN THE PAST 12 MONTHS, HOW MANY TIMES HAVE YOU MOVED WHERE YOU WERE LIVING?: 0

## 2025-04-09 SDOH — SOCIAL STABILITY: SOCIAL INSECURITY: ABUSE: ADULT

## 2025-04-09 SDOH — HEALTH STABILITY: MENTAL HEALTH: HOW OFTEN DO YOU HAVE A DRINK CONTAINING ALCOHOL?: NEVER

## 2025-04-09 SDOH — HEALTH STABILITY: MENTAL HEALTH: HOW MANY DRINKS CONTAINING ALCOHOL DO YOU HAVE ON A TYPICAL DAY WHEN YOU ARE DRINKING?: PATIENT DOES NOT DRINK

## 2025-04-09 SDOH — ECONOMIC STABILITY: HOUSING INSECURITY: AT ANY TIME IN THE PAST 12 MONTHS, WERE YOU HOMELESS OR LIVING IN A SHELTER (INCLUDING NOW)?: NO

## 2025-04-09 SDOH — SOCIAL STABILITY: SOCIAL INSECURITY: DO YOU FEEL ANYONE HAS EXPLOITED OR TAKEN ADVANTAGE OF YOU FINANCIALLY OR OF YOUR PERSONAL PROPERTY?: NO

## 2025-04-09 SDOH — SOCIAL STABILITY: SOCIAL INSECURITY: ARE THERE ANY APPARENT SIGNS OF INJURIES/BEHAVIORS THAT COULD BE RELATED TO ABUSE/NEGLECT?: NO

## 2025-04-09 SDOH — SOCIAL STABILITY: SOCIAL INSECURITY: WERE YOU ABLE TO COMPLETE ALL THE BEHAVIORAL HEALTH SCREENINGS?: YES

## 2025-04-09 SDOH — SOCIAL STABILITY: SOCIAL INSECURITY: HAVE YOU HAD ANY THOUGHTS OF HARMING ANYONE ELSE?: NO

## 2025-04-09 ASSESSMENT — LIFESTYLE VARIABLES
HOW OFTEN DO YOU HAVE A DRINK CONTAINING ALCOHOL: NEVER
HOW MANY STANDARD DRINKS CONTAINING ALCOHOL DO YOU HAVE ON A TYPICAL DAY: PATIENT DOES NOT DRINK
AUDIT-C TOTAL SCORE: 0
AUDIT-C TOTAL SCORE: 0
HOW OFTEN DO YOU HAVE 6 OR MORE DRINKS ON ONE OCCASION: NEVER
SKIP TO QUESTIONS 9-10: 1
SUBSTANCE_ABUSE_PAST_12_MONTHS: NO
AUDIT-C TOTAL SCORE: 0
SKIP TO QUESTIONS 9-10: 1
PRESCIPTION_ABUSE_PAST_12_MONTHS: NO

## 2025-04-09 ASSESSMENT — PAIN - FUNCTIONAL ASSESSMENT: PAIN_FUNCTIONAL_ASSESSMENT: 0-10

## 2025-04-09 ASSESSMENT — COGNITIVE AND FUNCTIONAL STATUS - GENERAL
MOBILITY SCORE: 24
PATIENT BASELINE BEDBOUND: NO
DAILY ACTIVITIY SCORE: 24

## 2025-04-09 ASSESSMENT — ACTIVITIES OF DAILY LIVING (ADL)
LACK_OF_TRANSPORTATION: NO
FEEDING YOURSELF: INDEPENDENT
LACK_OF_TRANSPORTATION: NO
WALKS IN HOME: INDEPENDENT
TOILETING: INDEPENDENT
BATHING: INDEPENDENT
DRESSING YOURSELF: INDEPENDENT
HEARING - LEFT EAR: FUNCTIONAL
ADEQUATE_TO_COMPLETE_ADL: YES
PATIENT'S MEMORY ADEQUATE TO SAFELY COMPLETE DAILY ACTIVITIES?: YES
GROOMING: INDEPENDENT
JUDGMENT_ADEQUATE_SAFELY_COMPLETE_DAILY_ACTIVITIES: YES
HEARING - RIGHT EAR: FUNCTIONAL

## 2025-04-09 ASSESSMENT — PAIN DESCRIPTION - LOCATION: LOCATION: LEG

## 2025-04-09 ASSESSMENT — COLUMBIA-SUICIDE SEVERITY RATING SCALE - C-SSRS
1. IN THE PAST MONTH, HAVE YOU WISHED YOU WERE DEAD OR WISHED YOU COULD GO TO SLEEP AND NOT WAKE UP?: NO
6. HAVE YOU EVER DONE ANYTHING, STARTED TO DO ANYTHING, OR PREPARED TO DO ANYTHING TO END YOUR LIFE?: NO
2. HAVE YOU ACTUALLY HAD ANY THOUGHTS OF KILLING YOURSELF?: NO

## 2025-04-09 ASSESSMENT — PATIENT HEALTH QUESTIONNAIRE - PHQ9
SUM OF ALL RESPONSES TO PHQ9 QUESTIONS 1 & 2: 0
1. LITTLE INTEREST OR PLEASURE IN DOING THINGS: NOT AT ALL
2. FEELING DOWN, DEPRESSED OR HOPELESS: NOT AT ALL

## 2025-04-09 ASSESSMENT — PAIN DESCRIPTION - PAIN TYPE: TYPE: ACUTE PAIN

## 2025-04-09 ASSESSMENT — PAIN DESCRIPTION - ORIENTATION: ORIENTATION: RIGHT

## 2025-04-09 ASSESSMENT — PAIN SCALES - GENERAL: PAINLEVEL_OUTOF10: 9

## 2025-04-09 NOTE — PROGRESS NOTES
Colten Eason is a 64 y.o. male admitted for No Principal Problem currently listed. Pharmacy reviewed the patient's opnav-qo-dhtcjvmal medications and allergies for accuracy.    Medications ADDED:  budesonide-glycopyr-formoterol (Breztri Aerosphere) 160-9-4.8 mcg/actuation HFA aerosol inhaler  Medications CHANGED:  aspirin 81 mg EC tablet  carvedilol (Coreg) 25 mg tablet  ketoconazole (NIZOral) 2 % cream  Medications REMOVED:   albuterol 90 mcg/actuation inhaler (duplicate)  azithromycin (Zithromax) 250 mg tablet   Miralax powder  Breyna 160-4.5 mcg Inhaler  Dulera 200-5 mcg  Inhaler  Simvastatin 40 mg tablet  Lamisil 250 mg tablet  Triamcinolone 0.1% cream    The list below reflects the updated PTA list. Comments regarding how patient may be taking medications differently can be found in the Admit Orders Activity  Prior to Admission Medications   Prescriptions Last Dose Informant   albuterol 90 mcg/actuation inhaler PRN Self   Sig: Inhale 2 puffs every 6 hours if needed for wheezing   or shortness of breath.   aspirin 81 mg EC tablet 4/8/2025 Morning Self   Sig: Take 1 tablet (81 mg) by mouth once daily.      budesonide-glycopyr-formoterol (Breztri Aerosphere) 160-9-4.8 mcg/actuation HFA aerosol inhaler 4/9/2025 Morning Self   Sig: Inhale 2 puffs 2 times a day.   carvedilol (Coreg) 25 mg tablet 4/8/2025 Morning Self   Sig: Take 1 tablet (25 mg) by mouth 2 times daily   (morning and late afternoon).   furosemide (Lasix) 40 mg tablet 4/8/2025   Noon Self   Sig: Take 1 tablet (40 mg) by mouth once daily.   ketoconazole (NIZOral) 2 % cream PRN Self   Sig: Apply 1 Application topically if needed.      Facility-Administered Medications: None        The list below reflects the updated allergy list. Please review each documented allergy for additional clarification and justification.  Allergies  Reviewed by Maykel Galloway on 4/9/2025   No Known Allergies         Pharmacy has been updated to [facility], [address] [phone]  [phone].    Sources used to complete the med history include:   Patient interview with wife at bedside, good historian, dispense report, care everywhere, chart review history    Below are additional concerns with the patient's PTA list.  None    Maykel Galloway CPhT  Please reach out via [facility] Secure Chat for questions

## 2025-04-09 NOTE — CONSULTS
Reason For Consult  R knee hematoma with active extrav    History Of Present Illness  64M hx CKD, COPD (no home O2), not on any blood thinners aside from ASA 81mg, who had a ground level fall onto R knee today around 1000AM and subsequently developed significant swelling and bruising to the R knee, for which he presented to the ED for. Vascular surgery engaged due to radiologic finding of small active extravasation on CTA RLE obtained today.    Tripped and fell while chasing a goose. Hit head after coming down on knee - CT head was negative. Swelled up pretty soon after the fall and has reportedly grown since. Primarily complaining of R knee pain and limited ROM of RLE because of this swelling and pain, but is otherwise entirely motor and sensory intact to the RLE.    Denies known bleeding disorders. No other thinners except for Aspirin. Doesn't usually bruise easily.    Denies fevers, chills, nausea, vomiting, diarrhea, headaches.    CTA RLE obtained shows small area of contrast extrav within the hematoma. The causative vessel is small and it is unclear     No other concerns.     Past Medical History  CKD  COPD    Surgical History  He has a past surgical history that includes CT angio coronary art with heartflow if score >30% (7/27/2020).     Social History  He reports that he has never smoked. He has never used smokeless tobacco. No history on file for alcohol use and drug use.    Family History  No family history on file.     Allergies  Patient has no known allergies.    Review of Systems  Negative, except for what is noted in HPI above.     Physical Exam  General: Nontoxic. NAD. In bed. Conversational.  HEENT: Atraumatic. EOMI. MMM.  Card: RRR  Pulm: Nonlabored breathing on room air  Abdomen: Obese, soft, nontender, nondistended.  Extremities: RLE: Large, soft hematoma over the torri-medial aspect of the R leg, predominantly medial. Overlying ecchymosis. Superficial skin blistering; non-ruptured. Palpable R DP  "and PT pulses. RLE compartments are soft and compressible. Motor and sensory intact to RLE and R foot. No wounds to LLE. Palpable DP/PT on L. Palpable femoral pulses bilaterally. Palpable radial pulses bilaterally. Limited range of motion of R knee 2/2 swelling.  Neuro: No focal deficits  Psych: Appropriate mood.     Last Recorded Vitals  Blood pressure 129/83, pulse 76, temperature 36.5 °C (97.7 °F), temperature source Oral, resp. rate 16, height 1.727 m (5' 8\"), weight 129 kg (285 lb), SpO2 (!) 93%.    Relevant Results  CTA RLE (4/9/2025)  Contrast blushing is in the two hematomas located in the medial  aspect of the distal right thigh and anteromedial aspect of the right  knee, suggestive of an arterial bleed.  It is indeterminate whether or  not these are contributed by branches of the profunda femoral artery,  or the geniculate arteries.     Assessment/Plan   64M presenting after GLF with large R knee hematoma and active extrav from small profunda vs geniculate artery on CTA.    - No vascular surgery intervention indicated at this time.  ----> Bleeding into hematoma should stop with compression. Additionally, very small bleeding vessel, unclear where originates, profunda vs geniculates, and isn't something that we would go after endovascularly for embolization.  ----> Patient was assessed several times over the span of 2-3 hours. There was no significant expansion of the hematoma appreciated. The lack of expansion is encouraging.    - Recommend engagement of ortho vs trauma surgery for management.    - Wrap RLE snuggly with an ACE.    - Regular neurovascular and compartment checks    - Monitor H&H    Patient seen and discussed with Dr. Stringer.    Oliver Andrade MD  Vascular Surgery, PGY4    ADDENDUM (10:02; 4/10/2025)  Patient re-assessed by vascular NP this morning. Wrap taken down. Large blister, not ruptured, to R knee with persistent swelling and ecchymosis. Concerning for skin compromise 2/2 hematoma. " Additionally, hgb 10.4 from 11.6 this AM.    Given skin compromise, will add on for OR for hematoma drainage today (Thursday, 4/10/2025) with Dr. Tao.    To be discussed with patient and primary team.    Oliver Andrade MD  Vascular Surgery, PGY4

## 2025-04-09 NOTE — ED TRIAGE NOTES
Pt from work via [place] for a fall. States he hit his head on the ground and has right knee pain. Pt denies LOC, no thinners.

## 2025-04-10 ENCOUNTER — ANESTHESIA EVENT (OUTPATIENT)
Dept: OPERATING ROOM | Facility: HOSPITAL | Age: 65
End: 2025-04-10
Payer: COMMERCIAL

## 2025-04-10 ENCOUNTER — ANESTHESIA (OUTPATIENT)
Dept: OPERATING ROOM | Facility: HOSPITAL | Age: 65
End: 2025-04-10
Payer: COMMERCIAL

## 2025-04-10 ENCOUNTER — SURGERY (OUTPATIENT)
Age: 65
End: 2025-04-10
Payer: COMMERCIAL

## 2025-04-10 PROBLEM — T14.8XXA HEMATOMA: Status: ACTIVE | Noted: 2025-04-09

## 2025-04-10 LAB
ABO GROUP (TYPE) IN BLOOD: NORMAL
HCT VFR BLD AUTO: 31.8 % (ref 41–52)
HCT VFR BLD AUTO: 32 % (ref 41–52)
HCT VFR BLD AUTO: 32.2 % (ref 41–52)
HGB BLD-MCNC: 10.2 G/DL (ref 13.5–17.5)
HGB BLD-MCNC: 10.4 G/DL (ref 13.5–17.5)
HGB BLD-MCNC: 10.5 G/DL (ref 13.5–17.5)
HOLD SPECIMEN: NORMAL
HOLD SPECIMEN: NORMAL
RH FACTOR (ANTIGEN D): NORMAL

## 2025-04-10 PROCEDURE — 2720000007 HC OR 272 NO HCPCS: Performed by: STUDENT IN AN ORGANIZED HEALTH CARE EDUCATION/TRAINING PROGRAM

## 2025-04-10 PROCEDURE — A4649 SURGICAL SUPPLIES: HCPCS | Performed by: STUDENT IN AN ORGANIZED HEALTH CARE EDUCATION/TRAINING PROGRAM

## 2025-04-10 PROCEDURE — 0SCC0ZZ EXTIRPATION OF MATTER FROM RIGHT KNEE JOINT, OPEN APPROACH: ICD-10-PCS | Performed by: STUDENT IN AN ORGANIZED HEALTH CARE EDUCATION/TRAINING PROGRAM

## 2025-04-10 PROCEDURE — 36415 COLL VENOUS BLD VENIPUNCTURE: CPT | Performed by: SURGERY

## 2025-04-10 PROCEDURE — 7100000002 HC RECOVERY ROOM TIME - EACH INCREMENTAL 1 MINUTE: Performed by: STUDENT IN AN ORGANIZED HEALTH CARE EDUCATION/TRAINING PROGRAM

## 2025-04-10 PROCEDURE — 2500000002 HC RX 250 W HCPCS SELF ADMINISTERED DRUGS (ALT 637 FOR MEDICARE OP, ALT 636 FOR OP/ED): Performed by: ANESTHESIOLOGIST ASSISTANT

## 2025-04-10 PROCEDURE — 3700000002 HC GENERAL ANESTHESIA TIME - EACH INCREMENTAL 1 MINUTE: Performed by: STUDENT IN AN ORGANIZED HEALTH CARE EDUCATION/TRAINING PROGRAM

## 2025-04-10 PROCEDURE — 2500000004 HC RX 250 GENERAL PHARMACY W/ HCPCS (ALT 636 FOR OP/ED): Performed by: ANESTHESIOLOGIST ASSISTANT

## 2025-04-10 PROCEDURE — 85014 HEMATOCRIT: CPT | Performed by: SURGERY

## 2025-04-10 PROCEDURE — 2500000001 HC RX 250 WO HCPCS SELF ADMINISTERED DRUGS (ALT 637 FOR MEDICARE OP): Performed by: SURGERY

## 2025-04-10 PROCEDURE — 3600000003 HC OR TIME - INITIAL BASE CHARGE - PROCEDURE LEVEL THREE: Performed by: STUDENT IN AN ORGANIZED HEALTH CARE EDUCATION/TRAINING PROGRAM

## 2025-04-10 PROCEDURE — 3600000008 HC OR TIME - EACH INCREMENTAL 1 MINUTE - PROCEDURE LEVEL THREE: Performed by: STUDENT IN AN ORGANIZED HEALTH CARE EDUCATION/TRAINING PROGRAM

## 2025-04-10 PROCEDURE — 27301 DRAIN THIGH/KNEE LESION: CPT | Performed by: STUDENT IN AN ORGANIZED HEALTH CARE EDUCATION/TRAINING PROGRAM

## 2025-04-10 PROCEDURE — 2060000001 HC INTERMEDIATE ICU ROOM DAILY

## 2025-04-10 PROCEDURE — 7100000001 HC RECOVERY ROOM TIME - INITIAL BASE CHARGE: Performed by: STUDENT IN AN ORGANIZED HEALTH CARE EDUCATION/TRAINING PROGRAM

## 2025-04-10 PROCEDURE — 2500000004 HC RX 250 GENERAL PHARMACY W/ HCPCS (ALT 636 FOR OP/ED): Performed by: ANESTHESIOLOGY

## 2025-04-10 PROCEDURE — 3700000001 HC GENERAL ANESTHESIA TIME - INITIAL BASE CHARGE: Performed by: STUDENT IN AN ORGANIZED HEALTH CARE EDUCATION/TRAINING PROGRAM

## 2025-04-10 PROCEDURE — 99222 1ST HOSP IP/OBS MODERATE 55: CPT | Performed by: SURGERY

## 2025-04-10 PROCEDURE — 85018 HEMOGLOBIN: CPT | Performed by: SURGERY

## 2025-04-10 PROCEDURE — 2500000004 HC RX 250 GENERAL PHARMACY W/ HCPCS (ALT 636 FOR OP/ED): Performed by: SURGERY

## 2025-04-10 RX ORDER — MORPHINE SULFATE 2 MG/ML
1 INJECTION, SOLUTION INTRAMUSCULAR; INTRAVENOUS EVERY 4 HOURS PRN
Status: DISCONTINUED | OUTPATIENT
Start: 2025-04-10 | End: 2025-04-10

## 2025-04-10 RX ORDER — ACETAMINOPHEN 160 MG/5ML
650 SOLUTION ORAL EVERY 4 HOURS PRN
Status: DISCONTINUED | OUTPATIENT
Start: 2025-04-10 | End: 2025-04-18 | Stop reason: HOSPADM

## 2025-04-10 RX ORDER — CEFAZOLIN 1 G/1
INJECTION, POWDER, FOR SOLUTION INTRAVENOUS AS NEEDED
Status: DISCONTINUED | OUTPATIENT
Start: 2025-04-10 | End: 2025-04-10

## 2025-04-10 RX ORDER — GLYCOPYRROLATE 0.2 MG/ML
INJECTION INTRAMUSCULAR; INTRAVENOUS AS NEEDED
Status: DISCONTINUED | OUTPATIENT
Start: 2025-04-10 | End: 2025-04-10

## 2025-04-10 RX ORDER — ONDANSETRON HYDROCHLORIDE 2 MG/ML
4 INJECTION, SOLUTION INTRAVENOUS EVERY 8 HOURS PRN
Status: DISCONTINUED | OUTPATIENT
Start: 2025-04-10 | End: 2025-04-18 | Stop reason: HOSPADM

## 2025-04-10 RX ORDER — METHOCARBAMOL 100 MG/ML
INJECTION, SOLUTION INTRAMUSCULAR; INTRAVENOUS AS NEEDED
Status: DISCONTINUED | OUTPATIENT
Start: 2025-04-10 | End: 2025-04-10

## 2025-04-10 RX ORDER — ROCURONIUM BROMIDE 10 MG/ML
INJECTION, SOLUTION INTRAVENOUS AS NEEDED
Status: DISCONTINUED | OUTPATIENT
Start: 2025-04-10 | End: 2025-04-10

## 2025-04-10 RX ORDER — FENTANYL CITRATE 50 UG/ML
INJECTION, SOLUTION INTRAMUSCULAR; INTRAVENOUS AS NEEDED
Status: DISCONTINUED | OUTPATIENT
Start: 2025-04-10 | End: 2025-04-10

## 2025-04-10 RX ORDER — MIDAZOLAM HYDROCHLORIDE 1 MG/ML
INJECTION, SOLUTION INTRAMUSCULAR; INTRAVENOUS AS NEEDED
Status: DISCONTINUED | OUTPATIENT
Start: 2025-04-10 | End: 2025-04-10

## 2025-04-10 RX ORDER — OXYCODONE HYDROCHLORIDE 5 MG/1
5 TABLET ORAL EVERY 4 HOURS PRN
Status: DISCONTINUED | OUTPATIENT
Start: 2025-04-10 | End: 2025-04-18 | Stop reason: HOSPADM

## 2025-04-10 RX ORDER — SODIUM CHLORIDE, SODIUM LACTATE, POTASSIUM CHLORIDE, CALCIUM CHLORIDE 600; 310; 30; 20 MG/100ML; MG/100ML; MG/100ML; MG/100ML
100 INJECTION, SOLUTION INTRAVENOUS CONTINUOUS
Status: ACTIVE | OUTPATIENT
Start: 2025-04-10 | End: 2025-04-11

## 2025-04-10 RX ORDER — HYDRALAZINE HYDROCHLORIDE 20 MG/ML
5 INJECTION INTRAMUSCULAR; INTRAVENOUS EVERY 30 MIN PRN
Status: DISCONTINUED | OUTPATIENT
Start: 2025-04-10 | End: 2025-04-10

## 2025-04-10 RX ORDER — ONDANSETRON 4 MG/1
4 TABLET, ORALLY DISINTEGRATING ORAL EVERY 6 HOURS PRN
Status: DISCONTINUED | OUTPATIENT
Start: 2025-04-10 | End: 2025-04-18 | Stop reason: HOSPADM

## 2025-04-10 RX ORDER — ACETAMINOPHEN 650 MG/1
650 SUPPOSITORY RECTAL EVERY 4 HOURS PRN
Status: DISCONTINUED | OUTPATIENT
Start: 2025-04-10 | End: 2025-04-18 | Stop reason: HOSPADM

## 2025-04-10 RX ORDER — ACETAMINOPHEN 325 MG/1
650 TABLET ORAL EVERY 4 HOURS PRN
Status: DISCONTINUED | OUTPATIENT
Start: 2025-04-10 | End: 2025-04-18 | Stop reason: HOSPADM

## 2025-04-10 RX ORDER — MEPERIDINE HYDROCHLORIDE 25 MG/ML
12.5 INJECTION INTRAMUSCULAR; INTRAVENOUS; SUBCUTANEOUS EVERY 10 MIN PRN
Status: DISCONTINUED | OUTPATIENT
Start: 2025-04-10 | End: 2025-04-10

## 2025-04-10 RX ORDER — PHENYLEPHRINE HYDROCHLORIDE 10 MG/ML
INJECTION INTRAVENOUS AS NEEDED
Status: DISCONTINUED | OUTPATIENT
Start: 2025-04-10 | End: 2025-04-10

## 2025-04-10 RX ORDER — IPRATROPIUM BROMIDE AND ALBUTEROL SULFATE 2.5; .5 MG/3ML; MG/3ML
SOLUTION RESPIRATORY (INHALATION) AS NEEDED
Status: DISCONTINUED | OUTPATIENT
Start: 2025-04-10 | End: 2025-04-10

## 2025-04-10 RX ORDER — LABETALOL HYDROCHLORIDE 5 MG/ML
5 INJECTION, SOLUTION INTRAVENOUS ONCE AS NEEDED
Status: DISCONTINUED | OUTPATIENT
Start: 2025-04-10 | End: 2025-04-10

## 2025-04-10 RX ORDER — PROPOFOL 10 MG/ML
INJECTION, EMULSION INTRAVENOUS AS NEEDED
Status: DISCONTINUED | OUTPATIENT
Start: 2025-04-10 | End: 2025-04-10

## 2025-04-10 RX ORDER — ALBUTEROL SULFATE 0.83 MG/ML
2.5 SOLUTION RESPIRATORY (INHALATION) ONCE AS NEEDED
Status: ACTIVE | OUTPATIENT
Start: 2025-04-10 | End: 2025-04-10

## 2025-04-10 RX ORDER — FENTANYL CITRATE 50 UG/ML
50 INJECTION, SOLUTION INTRAMUSCULAR; INTRAVENOUS EVERY 5 MIN PRN
Status: DISCONTINUED | OUTPATIENT
Start: 2025-04-10 | End: 2025-04-10

## 2025-04-10 RX ORDER — POLYETHYLENE GLYCOL 3350 17 G/17G
17 POWDER, FOR SOLUTION ORAL DAILY
Status: DISCONTINUED | OUTPATIENT
Start: 2025-04-10 | End: 2025-04-18 | Stop reason: HOSPADM

## 2025-04-10 RX ORDER — HYDRALAZINE HYDROCHLORIDE 20 MG/ML
5 INJECTION INTRAMUSCULAR; INTRAVENOUS EVERY 6 HOURS PRN
Status: ACTIVE | OUTPATIENT
Start: 2025-04-10 | End: 2025-04-10

## 2025-04-10 RX ADMIN — GLYCOPYRROLATE 0.3 MG: 0.2 INJECTION INTRAMUSCULAR; INTRAVENOUS at 14:38

## 2025-04-10 RX ADMIN — OXYCODONE HYDROCHLORIDE 5 MG: 5 TABLET ORAL at 09:46

## 2025-04-10 RX ADMIN — OXYCODONE HYDROCHLORIDE 5 MG: 5 TABLET ORAL at 17:23

## 2025-04-10 RX ADMIN — DEXAMETHASONE SODIUM PHOSPHATE 4 MG: 4 INJECTION, SOLUTION INTRAMUSCULAR; INTRAVENOUS at 15:00

## 2025-04-10 RX ADMIN — OXYCODONE HYDROCHLORIDE 5 MG: 5 TABLET ORAL at 12:30

## 2025-04-10 RX ADMIN — ROCURONIUM BROMIDE 100 MG: 10 INJECTION, SOLUTION INTRAVENOUS at 14:45

## 2025-04-10 RX ADMIN — PHENYLEPHRINE HYDROCHLORIDE 200 MCG: 10 INJECTION INTRAVENOUS at 15:06

## 2025-04-10 RX ADMIN — IPRATROPIUM BROMIDE AND ALBUTEROL SULFATE 2.5 MG: .5; 3 SOLUTION RESPIRATORY (INHALATION) at 14:05

## 2025-04-10 RX ADMIN — FENTANYL CITRATE 50 MCG: 50 INJECTION, SOLUTION INTRAMUSCULAR; INTRAVENOUS at 15:02

## 2025-04-10 RX ADMIN — CEFAZOLIN 3 G: 330 INJECTION, POWDER, FOR SOLUTION INTRAMUSCULAR; INTRAVENOUS at 14:53

## 2025-04-10 RX ADMIN — FENTANYL CITRATE 50 MCG: 0.05 INJECTION, SOLUTION INTRAMUSCULAR; INTRAVENOUS at 16:15

## 2025-04-10 RX ADMIN — MIDAZOLAM 1 MG: 1 INJECTION INTRAMUSCULAR; INTRAVENOUS at 14:38

## 2025-04-10 RX ADMIN — PROPOFOL 50 MG: 10 INJECTION, EMULSION INTRAVENOUS at 15:07

## 2025-04-10 RX ADMIN — METHOCARBAMOL 500 MG: 100 INJECTION INTRAMUSCULAR; INTRAVENOUS at 15:13

## 2025-04-10 RX ADMIN — PROPOFOL 120 MG: 10 INJECTION, EMULSION INTRAVENOUS at 14:45

## 2025-04-10 RX ADMIN — SODIUM CHLORIDE, SODIUM LACTATE, POTASSIUM CHLORIDE, AND CALCIUM CHLORIDE 100 ML/HR: 600; 310; 30; 20 INJECTION, SOLUTION INTRAVENOUS at 17:22

## 2025-04-10 RX ADMIN — MORPHINE SULFATE 1 MG: 2 INJECTION, SOLUTION INTRAMUSCULAR; INTRAVENOUS at 04:59

## 2025-04-10 RX ADMIN — OXYCODONE HYDROCHLORIDE 5 MG: 5 TABLET ORAL at 00:39

## 2025-04-10 RX ADMIN — SUGAMMADEX 200 MG: 100 INJECTION, SOLUTION INTRAVENOUS at 15:37

## 2025-04-10 RX ADMIN — HYDROMORPHONE HYDROCHLORIDE 0.2 MG: 1 INJECTION, SOLUTION INTRAMUSCULAR; INTRAVENOUS; SUBCUTANEOUS at 19:41

## 2025-04-10 RX ADMIN — PROPOFOL 30 MG: 10 INJECTION, EMULSION INTRAVENOUS at 15:33

## 2025-04-10 RX ADMIN — SODIUM CHLORIDE, POTASSIUM CHLORIDE, SODIUM LACTATE AND CALCIUM CHLORIDE: 600; 310; 30; 20 INJECTION, SOLUTION INTRAVENOUS at 14:38

## 2025-04-10 RX ADMIN — FENTANYL CITRATE 50 MCG: 50 INJECTION, SOLUTION INTRAMUSCULAR; INTRAVENOUS at 14:38

## 2025-04-10 RX ADMIN — METHOCARBAMOL 500 MG: 100 INJECTION INTRAMUSCULAR; INTRAVENOUS at 15:00

## 2025-04-10 RX ADMIN — MIDAZOLAM 1 MG: 1 INJECTION INTRAMUSCULAR; INTRAVENOUS at 15:01

## 2025-04-10 SDOH — HEALTH STABILITY: MENTAL HEALTH: CURRENT SMOKER: 0

## 2025-04-10 ASSESSMENT — COGNITIVE AND FUNCTIONAL STATUS - GENERAL
DAILY ACTIVITIY SCORE: 20
HELP NEEDED FOR BATHING: A LITTLE
DRESSING REGULAR LOWER BODY CLOTHING: A LITTLE
HELP NEEDED FOR BATHING: A LITTLE
DRESSING REGULAR UPPER BODY CLOTHING: A LITTLE
CLIMB 3 TO 5 STEPS WITH RAILING: A LITTLE
PERSONAL GROOMING: A LITTLE
MOBILITY SCORE: 22
WALKING IN HOSPITAL ROOM: A LITTLE
CLIMB 3 TO 5 STEPS WITH RAILING: A LITTLE
PERSONAL GROOMING: A LITTLE
MOBILITY SCORE: 22
DAILY ACTIVITIY SCORE: 20
DRESSING REGULAR LOWER BODY CLOTHING: A LITTLE
DRESSING REGULAR UPPER BODY CLOTHING: A LITTLE
WALKING IN HOSPITAL ROOM: A LITTLE

## 2025-04-10 ASSESSMENT — PAIN SCALES - PAIN ASSESSMENT IN ADVANCED DEMENTIA (PAINAD)
BREATHING: NORMAL
CONSOLABILITY: NO NEED TO CONSOLE
BODYLANGUAGE: RELAXED
BREATHING: NORMAL
TOTALSCORE: MEDICATION (SEE MAR)
BODYLANGUAGE: RELAXED
BODYLANGUAGE: RELAXED
CONSOLABILITY: NO NEED TO CONSOLE
TOTALSCORE: MEDICATION (SEE MAR);REPOSITIONED
FACIALEXPRESSION: SMILING OR INEXPRESSIVE
TOTALSCORE: 0
CONSOLABILITY: NO NEED TO CONSOLE
TOTALSCORE: MEDICATION (SEE MAR);EMOTIONAL SUPPORT
BREATHING: NORMAL
TOTALSCORE: 0
TOTALSCORE: 0

## 2025-04-10 ASSESSMENT — PAIN - FUNCTIONAL ASSESSMENT
PAIN_FUNCTIONAL_ASSESSMENT: 0-10
PAIN_FUNCTIONAL_ASSESSMENT: 0-10
PAIN_FUNCTIONAL_ASSESSMENT: CPOT (CRITICAL CARE PAIN OBSERVATION TOOL)
PAIN_FUNCTIONAL_ASSESSMENT: 0-10
PAIN_FUNCTIONAL_ASSESSMENT: FLACC (FACE, LEGS, ACTIVITY, CRY, CONSOLABILITY)
PAIN_FUNCTIONAL_ASSESSMENT: 0-10
PAIN_FUNCTIONAL_ASSESSMENT: CPOT (CRITICAL CARE PAIN OBSERVATION TOOL)
PAIN_FUNCTIONAL_ASSESSMENT: FLACC (FACE, LEGS, ACTIVITY, CRY, CONSOLABILITY)
PAIN_FUNCTIONAL_ASSESSMENT: FLACC (FACE, LEGS, ACTIVITY, CRY, CONSOLABILITY)
PAIN_FUNCTIONAL_ASSESSMENT: 0-10
PAIN_FUNCTIONAL_ASSESSMENT: FLACC (FACE, LEGS, ACTIVITY, CRY, CONSOLABILITY)

## 2025-04-10 ASSESSMENT — ENCOUNTER SYMPTOMS
DIZZINESS: 0
DIAPHORESIS: 0
CONSTIPATION: 0
DIARRHEA: 0
FEVER: 0
CHILLS: 0
BRUISES/BLEEDS EASILY: 1

## 2025-04-10 ASSESSMENT — PAIN SCALES - GENERAL
PAINLEVEL_OUTOF10: 8
PAINLEVEL_OUTOF10: 5 - MODERATE PAIN
PAINLEVEL_OUTOF10: 7
PAINLEVEL_OUTOF10: 3
PAINLEVEL_OUTOF10: 6
PAIN_LEVEL: 7
PAINLEVEL_OUTOF10: 8
PAINLEVEL_OUTOF10: 6
PAINLEVEL_OUTOF10: 3
PAINLEVEL_OUTOF10: 0 - NO PAIN
PAINLEVEL_OUTOF10: 0 - NO PAIN
PAINLEVEL_OUTOF10: 7

## 2025-04-10 ASSESSMENT — PAIN SCALES - WONG BAKER
WONGBAKER_NUMERICALRESPONSE: HURTS LITTLE BIT
WONGBAKER_NUMERICALRESPONSE: HURTS LITTLE MORE

## 2025-04-10 ASSESSMENT — ACTIVITIES OF DAILY LIVING (ADL)
EFFECT OF PAIN ON DAILY ACTIVITIES: YES
EFFECT OF PAIN ON DAILY ACTIVITIES: YES

## 2025-04-10 ASSESSMENT — PAIN DESCRIPTION - DESCRIPTORS
DESCRIPTORS: RADIATING;SORE
DESCRIPTORS: RADIATING;SORE

## 2025-04-10 NOTE — INTERVAL H&P NOTE
H&P reviewed. The patient was examined and there are no changes to the H&P.    Plan for Right leg hematoma evacuation. The risks and benefits, including bleeding, infection, injury to surrounding structures, poor wound healing, skin necrosis, need for additional procedures, were discussed with the patient who elected to proceed.     Mook Tao MD

## 2025-04-10 NOTE — NURSING NOTE
Assumed pt care, vascular at bedside now. Re-wrapped ace bandage over knee. Education on hematoma given by NP at bedside. Blister apparent on knee as well, expected to rupture at some point. Bandage in place to absorb seepage and protect knee. Plan for ultrasound , schedule pending. Continuing to monitor. No complaints at this time. Wife at bedside.    1300: pt transported to PACU

## 2025-04-10 NOTE — H&P (VIEW-ONLY)
Reason For Consult  Right medial thigh hematoma    History Of Present Illness  Colten Eason is a 64 y.o. male presenting with a past medical history of COPD, CKD.  Patient was outside yesterday chasing a goose subsequently fell.  He states that when he fell he landed on his right knee.  He noticed significant swelling almost immediately.  He came to the ER where he was noted to have a large hematoma to the right medial thigh/knee region.  He was admitted for evaluation.  CT angiogram noted a small contrast blush seen in the hematoma medial aspect of the distal right thigh/knee.  The patient had compression with an Ace wrap applied to the area.  He notes severe pain especially with palpation.  Initial hemoglobin was 11.6 which subsequently dropped to 10.4 overnight.     Past Medical History  He has no past medical history on file.    Surgical History  He has a past surgical history that includes CT angio coronary art with heartflow if score >30% (7/27/2020).     Social History  He reports that he has never smoked. He has never used smokeless tobacco. No history on file for alcohol use and drug use.    Family History  No family history on file.     Allergies  Patient has no known allergies.    Review of Systems  CONSTITUTIONAL: Denies weight loss, fever and chills.    HEENT: Denies changes in vision and hearing.    RESPIRATORY: Denies SOB and cough.    CV: Denies palpitations and CP.    GI: Denies abdominal pain, nausea, vomiting and diarrhea.    : Denies dysuria and urinary frequency.    MSK: Denies myalgia and joint pain.  Positive for right knee pain/swelling    SKIN: Denies rash and pruritus.    VASC: Denies claudication, ischemic rest pain, or open wounds or sores.  Positive for significant hematoma right medial thigh/knee    NEUROLOGICAL: Denies headache and syncope.    PSYCHIATRIC: Denies recent changes in mood. Denies anxiety and depression.     Physical Exam  General: Pt is alert and oriented x 3.  "Pleasant, conversive  HEENT: Head is atraumatic, normocephalic.   Cardiac: Normal S1-S2.  Regular rate and rhythm.  No murmurs.  Respiratory: Lungs clear to auscultation.  No adventitious sounds.  Abdomen: Soft, nondistended, nontender.  Bowel sounds x4 quadrants.  Protuberant, morbidly obese  Pulse exam: Palpable brachial and radial pulses bilaterally.   Femoral, popliteal, pedal pulses are palpable bilaterally.  Extremities: 2+ pitting edema right lower extremity.  There is significant ecchymosis and induration to right medial thigh area.  There is a fluid-filled bulla overlying the hematoma.  Neuro: Moves all extremities spontaneously.  No focal deficits.  Psych: Appropriate affect.  Answers questions appropriately.         Last Recorded Vitals  Blood pressure 117/75, pulse 73, temperature 36.3 °C (97.3 °F), temperature source Temporal, resp. rate 20, height 1.727 m (5' 8\"), weight 131 kg (288 lb 5.8 oz), SpO2 93%.    Relevant Results  CT angio lower extremity right w and or wo IV contrast    Result Date: 4/9/2025  STUDY: CT CTA LOWER EXTREMITY RIGHT; 04/09/2025 06:01 PM INDICATION:  Right lower extremity hematoma, evaluate for vessel dissection. COMPARISON: CT Knee 04/09/2025; XR Femur 04/09/2025. ACCESSION NUMBER(S): [id number] ORDERING CLINICIAN: TECHNIQUE:  Helical CT is performed from the infrarenal aorta through the feet after bolus administration of 120 mL of Omnipaque 350. Images are reviewed and processed at a workstation according to the CT angiogram protocol with 3-D and/or MIP post processing imaging generated.  2236 DICOM images received. Automated mA/kV exposure control was utilized and patient examination was performed in strict accordance with principles of ALARA. FINDINGS: No stenoses are seen within the visualized portions of the right superficial femoral artery, or within the right popliteal artery.  No intimal flap is visualized to indicate a dissection.  There are small contrast blush is " seen in the hematomas in the medial aspect of the distal right thigh, and anterior medial aspect of the right knee.  It is indeterminate whether or not there are branches from the profunda femoral artery or the geniculate branches from the popliteal artery are contributing to these hematomas.    1.  Contrast blushing is in the two hematomas located in the medial aspect of the distal right thigh and anteromedial aspect of the right knee, suggestive of an arterial bleed.  It is indeterminate whether or not these are contributed by branches of the profunda femoral artery, or the geniculate arteries. 2.  No stenoses or dissections within the visualized portions of the right superficial femoral artery, or right popliteal artery. This report was called to Dr. Ricardo Cook at 7:40 PM on April 9, 2025. Signed by Nik Hart MD    XR hand right 3+ views    Result Date: 4/9/2025  Interpreted By:  Ludwig Briggs, STUDY: XR HAND RIGHT 3+ VIEWS   INDICATION: Signs/Symptoms:right hand pain.   COMPARISON: None   ACCESSION NUMBER(S): [id number]   ORDERING CLINICIAN: QIAN DELAROSA   FINDINGS: Mild osteoarthritis right hand more advanced at the 1st [facility]. No evidence of fracture or malalignment       Osteoarthritis right hand most prominent at the 1st [facility].   Signed by: Ludwig Briggs 4/9/2025 5:28 PM Dictation workstation:   [id number]    CT knee right wo IV contrast    Result Date: 4/9/2025  STUDY: CT Extremity; Completed Time:  4/9/2025 3:12 PM INDICATION: Evaluate for soft tissue hematoma. COMPARISON: XR RT femur 4/9/2025. ACCESSION NUMBER(S): [id number] ORDERING CLINICIAN: QIAN DELAROSA TECHNIQUE:  Thin section axial images were obtained through the right knee without intravenous contrast.  Orthogonal reconstructed images were obtained and reviewed.  Automated mA/kV exposure control was utilized and patient examination was performed in strict accordance with principles of ALARA. FINDINGS: No acute fractures or dislocations are  visualized within the right knee.  There is no evidence of patellar subluxation.  No suprapatellar effusion is appreciated.  There is prominent soft tissue swelling over the anterior aspect of the knee.  There is a hyperdense fluid collection, indicative of a hematoma.  It is estimated at 14.5 x 9.9 x 5.2 cm.  There is impingement noted upon the vastus medialis of the quadriceps muscle.  However, this does not appear to involve the musculature.  There is a second hematoma over the medial aspect the distal thigh, measured at 11.7 x 8.6 x 2.7 cm.  There is generalized subcutaneous edema over the knee.    1.  Large hematoma over the anteromedial aspect of the right knee. 2.  Additional hematoma over the medial aspect of the distal right thigh. 3.  No acute osseous findings involving the right knee. 4.  No prominent hematoma appreciated within the surrounding musculature.  5.  Subcutaneous edema overlying the right knee. Signed by Nik Hart MD    XR femur right 2+ views    Result Date: 4/9/2025  STUDY: Femur Radiographs; 4/9/2025 12:50 PM INDICATION: Right thigh pain. COMPARISON: None Available. ACCESSION NUMBER(S): [id number] ORDERING CLINICIAN: QIAN DELAROSA TECHNIQUE:  Two view(s) of the right femur (five images). FINDINGS:  There is no displaced fracture.  The alignment is anatomic.  Narrowing of the right hip joint space.  Prominent soft tissue swelling of the distal thigh and knee.    No acute bone or joint abnormality. Soft tissue swelling. DJD. Signed by Deniz Benson MD    XR knee right 4+ views    Result Date: 4/9/2025  Interpreted By:  Ca Viera, STUDY: XR KNEE RIGHT 4+ VIEWS;  4/9/2025 12:45 pm   INDICATION: Signs/Symptoms:right knee pain.     COMPARISON: None.   ACCESSION NUMBER(S): [id number]   ORDERING CLINICIAN: QIAN DELAROSA   FINDINGS: Five views of the right knee obtained.   No acute fracture or osseous displacement. Mild medial compartment narrowing. Minimal lateral and patellofemoral  compartment spurring. Prominent anterior/prepatellar soft tissue swelling with possible subtle nodularity. Faint linear presumed vascular calcifications in the posterior soft tissues       No definite acute osseous finding. Mild degenerative changes. Prominent anterior soft tissue swelling with possible nodularity.   MACRO: None.   Signed by: Ca Viera 4/9/2025 12:56 PM Dictation workstation:   [id number]    CT head wo IV contrast    Result Date: 4/9/2025  Interpreted By:  Adal Landeros, STUDY: CT HEAD WO IV CONTRAST;  4/9/2025 12:42 pm   INDICATION: Signs/Symptoms:fall, hit head.   COMPARISON: None.   ACCESSION NUMBER(S): [id number]   ORDERING CLINICIAN: QIAN DELAROSA   TECHNIQUE: Noncontrast axial CT scan of head was performed. Angled reformats in brain and bone windows were generated. The images were reviewed in bone, brain, blood and soft tissue windows.   FINDINGS: CSF Spaces: The ventricles, sulci and basal cisterns are within normal limits. There is no extraaxial fluid collection.   Parenchyma: Moderate parenchymal atrophy. Periventricular and subcortical white matter hypoattenuation is nonspecific, however likely reflects chronic microvascular ischemic versus chronic hypertensive changes. The grey-white differentiation is intact. There is no mass effect or midline shift.  There is no intracranial hemorrhage. Minimal density along the periphery of the bifrontal regions (series 4, images 20 9-38) is favored to be secondary to a mixture of motion artifact and localized beam hardening artifact   Calvarium: No acute displaced calvarial fracture. Infiltration of the right frontal scalp suggestive of localized soft tissue injury.   Paranasal sinuses and mastoids: Mastoid air cells appear clear. 2.4 x 2.0 x 2.8 cm rounded cystic lesion demonstrating peripheral calcification in the region peripheral to the fossa of Rosenmuller. Presumed cerumen within the bilateral external auditory canals. Mild mucosal  thickening of the bilateral ethmoid and right frontal sinus.       No evidence of acute cortical infarct or intracranial hemorrhage.   2.4 x 2.0 x 2.8 cm rounded cystic lesion containing peripheral calcification in the region peripheral to the fossa of Rosenmuller. Follow-up nonemergent MRI soft tissue neck protocol advised for further assessment.   MACRO: None     Signed by: Adal Landeros 4/9/2025 12:53 PM Dictation workstation:   [id number]      Results for orders placed or performed during the hospital encounter of 04/09/25 (from the past 24 hours)   CBC and Auto Differential   Result Value Ref Range    WBC 8.9 4.4 - 11.3 x10*3/uL    nRBC 0.0 0.0 - 0.0 /100 WBCs    RBC 3.81 (L) 4.50 - 5.90 x10*6/uL    Hemoglobin 11.6 (L) 13.5 - 17.5 g/dL    Hematocrit 35.5 (L) 41.0 - 52.0 %    MCV 93 80 - 100 fL    MCH 30.4 26.0 - 34.0 pg    MCHC 32.7 32.0 - 36.0 g/dL    RDW 14.0 11.5 - 14.5 %    Platelets 204 150 - 450 x10*3/uL    Neutrophils % 72.1 40.0 - 80.0 %    Immature Granulocytes %, Automated 1.7 (H) 0.0 - 0.9 %    Lymphocytes % 16.3 13.0 - 44.0 %    Monocytes % 8.5 2.0 - 10.0 %    Eosinophils % 0.8 0.0 - 6.0 %    Basophils % 0.6 0.0 - 2.0 %    Neutrophils Absolute 6.44 1.20 - 7.70 x10*3/uL    Immature Granulocytes Absolute, Automated 0.15 0.00 - 0.70 x10*3/uL    Lymphocytes Absolute 1.45 1.20 - 4.80 x10*3/uL    Monocytes Absolute 0.76 0.10 - 1.00 x10*3/uL    Eosinophils Absolute 0.07 0.00 - 0.70 x10*3/uL    Basophils Absolute 0.05 0.00 - 0.10 x10*3/uL   Comprehensive metabolic panel   Result Value Ref Range    Glucose 102 (H) 74 - 99 mg/dL    Sodium 141 136 - 145 mmol/L    Potassium 4.3 3.5 - 5.3 mmol/L    Chloride 111 (H) 98 - 107 mmol/L    Bicarbonate 24 21 - 32 mmol/L    Anion Gap 10 10 - 20 mmol/L    Urea Nitrogen 55 (H) 6 - 23 mg/dL    Creatinine 2.00 (H) 0.50 - 1.30 mg/dL    eGFR 37 (L) >60 mL/min/1.73m*2    Calcium 8.2 (L) 8.6 - 10.3 mg/dL    Albumin 3.2 (L) 3.4 - 5.0 g/dL    Alkaline Phosphatase 47 33 - 136  U/L    Total Protein 5.5 (L) 6.4 - 8.2 g/dL    AST 13 9 - 39 U/L    Bilirubin, Total 0.3 0.0 - 1.2 mg/dL    ALT 18 10 - 52 U/L   Protime-INR   Result Value Ref Range    Protime 11.2 9.3 - 12.7 seconds    INR 1.0 0.9 - 1.2   APTT   Result Value Ref Range    aPTT 24.1 22.0 - 32.5 seconds   Urinalysis with Reflex Culture and Microscopic   Result Value Ref Range    Color, Urine Colorless (N) Light-Yellow, Yellow, Dark-Yellow    Appearance, Urine Clear Clear    Specific Gravity, Urine 1.017 1.005 - 1.035    pH, Urine 6.0 5.0, 5.5, 6.0, 6.5, 7.0, 7.5, 8.0    Protein, Urine 300 (3+) (A) NEGATIVE, 10 (TRACE), 20 (TRACE) mg/dL    Glucose, Urine 30 (TRACE) (A) Normal mg/dL    Blood, Urine 0.06 (1+) (A) NEGATIVE mg/dL    Ketones, Urine NEGATIVE NEGATIVE mg/dL    Bilirubin, Urine NEGATIVE NEGATIVE mg/dL    Urobilinogen, Urine Normal Normal mg/dL    Nitrite, Urine NEGATIVE NEGATIVE    Leukocyte Esterase, Urine NEGATIVE NEGATIVE   Extra Urine Gray Tube   Result Value Ref Range    Extra Tube Hold for add-ons.    Urinalysis Microscopic   Result Value Ref Range    WBC, Urine 1-5 1-5, NONE /HPF    RBC, Urine 1-2 NONE, 1-2, 3-5 /HPF    Mucus, Urine FEW Reference range not established. /LPF   PST Top   Result Value Ref Range    Extra Tube Hold for add-ons.    Hemoglobin and hematocrit, blood   Result Value Ref Range    Hemoglobin 10.4 (L) 13.5 - 17.5 g/dL    Hematocrit 32.2 (L) 41.0 - 52.0 %         Assessment/Plan   Right medial thigh/knee hematoma  Blood loss anemia    Patient with a traumatic hematoma secondary to a fall that he sustained yesterday 4/9/2025.  He has a rather significant hematoma to the medial aspect of the thigh/knee.  There is concern for skin compromise as a large fluid-filled bulla has formed overlying the hematoma.  Additionally, CT angiogram was reviewed in detail which noted blushing in the hematoma, concerning for active extravasation.    Will plan for a arterial duplex to assess for active extravasation in  the hematoma as well as repeat hemoglobin at noon.    Also, have discussed this patient in detail with Dr. Tao.  He may need to surgery later this afternoon for hematoma evacuation, especially if the hemoglobin continues to decrease.  Make NPO      I spent 45 minutes in the professional and overall care of this patient.      Andrey Egan, APRN-CNP

## 2025-04-10 NOTE — ANESTHESIA PREPROCEDURE EVALUATION
"Patient: Colten Eason    Procedure Information       Anesthesia Start Date/Time: 04/10/25 1438    Procedure: EVACUATION, HEMATOMA, THIGH (Right: Thigh - Leg Upper)    Location: [facility]    Surgeons: Mook Tao MD          COPD  Kidney disease and kidney stones  \"Issues with his heart\" NO stents      Prior surgery without problems.  No Known Allergies    Clinical information reviewed:    Allergies  Meds               NPO Detail:  NPO/Void Status  Carbohydrate Drink Given Prior to Surgery? : N  Date of Last Liquid: 04/09/25  Time of Last Liquid: 0000  Date of Last Solid: 04/09/25  Time of Last Solid: 0900  Last Intake Type: -- (Lemon ice at 9 am)  Time of Last Void: 1305         Physical Exam    Airway  Mallampati: II  TM distance: >3 FB  Neck ROM: full  Comments: Full beard   Cardiovascular   Comments: deferred   Dental    Pulmonary   Comments: deferred   Abdominal     Comments: deferred           Anesthesia Plan    History of general anesthesia?: yes  History of complications of general anesthesia?: no    ASA 3     general   (ETT)  The patient is not a current smoker.    intravenous induction   Postoperative administration of opioids is intended.      "

## 2025-04-10 NOTE — NURSING NOTE
Four Eye Skin Check completed by Shelia.     Acute findings noted and the following was completed:   _Wound on right knee_ to LDA, photo taken, wound was mesasured and assessed, Physican notified, assessed for correct mattress (indicate if new mattress added), wound consulted, initiated Nutrition consult if applicable.

## 2025-04-10 NOTE — NURSING NOTE
Came in per cart alert and oriented due to Fall. Placed on bed, Assumed care of patient. Assessment done and charted. Right Leg wrapped with ace bandage and keep elevated. No complaint at this time. Will monitor.

## 2025-04-10 NOTE — ANESTHESIA PROCEDURE NOTES
Airway  Date/Time: 4/10/2025 2:47 PM  Urgency: elective    Airway not difficult    Staffing  Performed: [facility]   Authorized by: Dejon Flynn MD    Performed by: KING Gallo  Patient location during procedure: OR    Indications and Patient Condition  Indications for airway management: anesthesia  Spontaneous Ventilation: absent  Sedation level: deep  Preoxygenated: yes  Patient position: sniffing  Mask difficulty assessment: 2 - vent by mask + OA or adjuvant +/- NMBA    Final Airway Details  Final airway type: endotracheal airway      Successful airway: ETT  Cuffed: yes   Successful intubation technique: direct laryngoscopy  Endotracheal tube insertion site: oral  Blade: Dustin  Blade size: #4  ETT size (mm): 8.0  Cormack-Lehane Classification: grade I - full view of glottis  Placement verified by: chest auscultation   Measured from: lips  ETT to lips (cm): 22  Number of attempts at approach: 1  Ventilation between attempts: BVM    Additional Comments  Intubated on inpatient bed due to pain.  Dyer, elective nasal trumpet placed and connected to circuit.  Easy DL by [facility]AA, no stylet used

## 2025-04-10 NOTE — CARE PLAN
Problem: Pain - Adult  Goal: Verbalizes/displays adequate comfort level or baseline comfort level  Outcome: Progressing     Problem: Safety - Adult  Goal: Free from fall injury  Outcome: Progressing     Problem: Discharge Planning  Goal: Discharge to home or other facility with appropriate resources  Outcome: Progressing     Problem: Chronic Conditions and Co-morbidities  Goal: Patient's chronic conditions and co-morbidity symptoms are monitored and maintained or improved  Outcome: Progressing     Problem: Nutrition  Goal: Nutrient intake appropriate for maintaining nutritional needs  Outcome: Progressing     Problem: Skin  Goal: Decreased wound size/increased tissue granulation at next dressing change  Outcome: Progressing  Goal: Participates in plan/prevention/treatment measures  Outcome: Progressing  Goal: Prevent/manage excess moisture  Outcome: Progressing  Goal: Prevent/minimize sheer/friction injuries  Outcome: Progressing  Goal: Promote/optimize nutrition  Outcome: Progressing  Goal: Promote skin healing  Outcome: Progressing     Problem: Fall/Injury  Goal: Not fall by end of shift  Outcome: Progressing  Goal: Be free from injury by end of the shift  Outcome: Progressing  Goal: Verbalize understanding of personal risk factors for fall in the hospital  Outcome: Progressing  Goal: Verbalize understanding of risk factor reduction measures to prevent injury from fall in the home  Outcome: Progressing  Goal: Use assistive devices by end of the shift  Outcome: Progressing  Goal: Pace activities to prevent fatigue by end of the shift  Outcome: Progressing     Problem: Pain  Goal: Takes deep breaths with improved pain control throughout the shift  Outcome: Progressing  Goal: Turns in bed with improved pain control throughout the shift  Outcome: Progressing  Goal: Walks with improved pain control throughout the shift  Outcome: Progressing  Goal: Performs ADL's with improved pain control throughout shift  Outcome:  Progressing  Goal: Participates in PT with improved pain control throughout the shift  Outcome: Progressing  Goal: Free from opioid side effects throughout the shift  Outcome: Progressing  Goal: Free from acute confusion related to pain meds throughout the shift  Outcome: Progressing   The patient's goals for the shift include Patient Safety    The clinical goals for the shift include No Fall    Over the shift, the patient did not make progress toward the following goals. Barriers to progression include . Recommendations to address these barriers include .

## 2025-04-10 NOTE — OP NOTE
EVACUATION, HEMATOMA, THIGH (R) Operative Note     Date: 4/10/2025  OR Location: [facility]    Name: Colten Eason, : 1960, Age: 64 y.o., MRN: [id number], Sex: male    Diagnosis  Pre-op Diagnosis      * Hematoma [T14.8XXA] Post-op Diagnosis     * Hematoma [T14.8XXA]     Procedures  EVACUATION, HEMATOMA, THIGH  29616 - SC I&D DEEP ABSC BURSA/HEMATOMA THIGH/KNEE REGION      Surgeons      * Mrinalini Tao - Primary    Resident/Fellow/Other Assistant:  Surgeons and Role:  * No surgeons found with a matching role *    Staff:   Circulator: Mirela Martinez Person: Neville    Anesthesia Staff: Anesthesiologist: Dejon Flynn MD  C-AA: KING Gallo    Procedure Summary  Anesthesia: Anesthesia type not filed in the log.  ASA: ASA status not filed in the log.  Estimated Blood Loss: 5 mL  Intra-op Medications:   Administrations occurring from 1430 to 1535 on 04/10/25:   Medication Name Total Dose   ceFAZolin (Ancef) 1 g 3 g   dexAMETHasone (Decadron) injection 4 mg/mL 4 mg   fentaNYL PF 0.05 mg/mL 100 mcg   glycopyrrolate (Robinul) injection 0.3 mg   LR bolus Cannot be calculated   methocarbamol (Robaxin) injection 1,000 mg   midazolam (Versed) 1 mg/1 mL 2 mg   phenylephrine (Terell-Synephrine) injection 200 mcg   propofol (Diprivan) injection 10 mg/mL 200 mg   rocuronium (ZeMuron) 50 mg/5 mL injection 100 mg              Anesthesia Record               Intraprocedure I/O Totals          Intake    LR bolus 1100.00 mL    Total Intake 1100 mL       Output    Est. Blood Loss 5 mL    Total Output 5 mL       Net    Net Volume 1095 mL          Specimen: No specimens collected              Drains and/or Catheters:   Closed/Suction Drain 1 Right;Anterior Thigh 19 Fr. (Active)       Tourniquet Times:         Implants:     Findings: Old hematoma.  No active bleeding.  Cavity down to the knee without any violation of the joint space.    Indications: Colten Eason is an 64 y.o. male with history of hypertension, COPD, CKD who  presented with a right thigh and knee hematoma after he fell after chasing a groups yesterday.  Given the large size of the hematoma and overlying skin duskiness, hematoma evacuation was recommended to prevent any further ischemia to the skin.  The risks and benefits were discussed with the patient who elected to proceed.    The patient was seen in the preoperative area. The risks, benefits, complications, treatment options, non-operative alternatives, expected recovery and outcomes were discussed with the patient. The possibilities of reaction to medication, pulmonary aspiration, injury to surrounding structures, bleeding, recurrent infection, the need for additional procedures, failure to diagnose a condition, and creating a complication requiring transfusion or operation were discussed with the patient. The patient concurred with the proposed plan, giving informed consent.  The site of surgery was properly noted/marked if necessary per policy. The patient has been actively warmed in preoperative area. Preoperative antibiotics have been ordered and given within 1 hours of incision. Venous thrombosis prophylaxis are not indicated.    Procedure Details:     The patient was taken to the operating room and placed in supine position.  General anesthesia was then induced.  The patient was then prepped and draped in the usual sterile fashion.  A timeout was then performed to verify the patient, procedure, site prior to beginning.    A vertical incision was made just medial to the hematoma and compromised skin.  This was deepened through the subcutaneous tissue until the hematoma cavity was entered.  Old blood and hematoma was then evacuated.  There was no concern for infection.  After the hematoma was completely evacuated, the cavity was copiously irrigated with 2 L of saline.  There was no active bleeding that was noted.  A 19 Faroese fully fluted KATTY drain was then placed within the cavity to prevent any reaccumulation.   The medial incision was then closed using 2-0 nylon suture in interrupted fashion.  Xeroform was then applied over the area of the compromised skin with a ruptured bulla.  Sterile dressing was then applied and the leg was wrapped with Kerlix followed by Ace.     The patient tolerated the procedure well and was extubated without any difficulty.  He was taken to the PACU in stable condition.  I was present for the entirety of the case.    Complications:  None; patient tolerated the procedure well.    Disposition: PACU - hemodynamically stable.  Condition: stable     Attending Attestation: I was present and scrubbed for the entire procedure.    Mook Tao  Phone Number: [phone]

## 2025-04-10 NOTE — CARE PLAN
The patient's goals for the shift include Patient Safety    The clinical goals for the shift include Patient will remaqin HDS this shift      Problem: Pain - Adult  Goal: Verbalizes/displays adequate comfort level or baseline comfort level  Outcome: Progressing     Problem: Safety - Adult  Goal: Free from fall injury  Outcome: Progressing     Problem: Discharge Planning  Goal: Discharge to home or other facility with appropriate resources  Outcome: Progressing     Problem: Chronic Conditions and Co-morbidities  Goal: Patient's chronic conditions and co-morbidity symptoms are monitored and maintained or improved  Outcome: Progressing     Problem: Nutrition  Goal: Nutrient intake appropriate for maintaining nutritional needs  Outcome: Progressing     Problem: Skin  Goal: Decreased wound size/increased tissue granulation at next dressing change  Outcome: Progressing  Goal: Participates in plan/prevention/treatment measures  Outcome: Progressing  Goal: Prevent/manage excess moisture  Outcome: Progressing  Goal: Prevent/minimize sheer/friction injuries  Outcome: Progressing  Goal: Promote/optimize nutrition  Outcome: Progressing  Goal: Promote skin healing  Outcome: Progressing     Problem: Fall/Injury  Goal: Not fall by end of shift  Outcome: Progressing  Goal: Be free from injury by end of the shift  Outcome: Progressing  Goal: Verbalize understanding of personal risk factors for fall in the hospital  Outcome: Progressing  Goal: Verbalize understanding of risk factor reduction measures to prevent injury from fall in the home  Outcome: Progressing  Goal: Use assistive devices by end of the shift  Outcome: Progressing  Goal: Pace activities to prevent fatigue by end of the shift  Outcome: Progressing     Problem: Pain  Goal: Takes deep breaths with improved pain control throughout the shift  Outcome: Progressing  Goal: Turns in bed with improved pain control throughout the shift  Outcome: Progressing  Goal: Walks with  improved pain control throughout the shift  Outcome: Progressing  Goal: Performs ADL's with improved pain control throughout shift  Outcome: Progressing  Goal: Participates in PT with improved pain control throughout the shift  Outcome: Progressing  Goal: Free from opioid side effects throughout the shift  Outcome: Progressing  Goal: Free from acute confusion related to pain meds throughout the shift  Outcome: Progressing

## 2025-04-10 NOTE — CONSULTS
Reason For Consult  Right medial thigh hematoma    History Of Present Illness  Colten Eason is a 64 y.o. male presenting with a past medical history of COPD, CKD.  Patient was outside yesterday chasing a goose subsequently fell.  He states that when he fell he landed on his right knee.  He noticed significant swelling almost immediately.  He came to the ER where he was noted to have a large hematoma to the right medial thigh/knee region.  He was admitted for evaluation.  CT angiogram noted a small contrast blush seen in the hematoma medial aspect of the distal right thigh/knee.  The patient had compression with an Ace wrap applied to the area.  He notes severe pain especially with palpation.  Initial hemoglobin was 11.6 which subsequently dropped to 10.4 overnight.     Past Medical History  He has no past medical history on file.    Surgical History  He has a past surgical history that includes CT angio coronary art with heartflow if score >30% (7/27/2020).     Social History  He reports that he has never smoked. He has never used smokeless tobacco. No history on file for alcohol use and drug use.    Family History  No family history on file.     Allergies  Patient has no known allergies.    Review of Systems  CONSTITUTIONAL: Denies weight loss, fever and chills.    HEENT: Denies changes in vision and hearing.    RESPIRATORY: Denies SOB and cough.    CV: Denies palpitations and CP.    GI: Denies abdominal pain, nausea, vomiting and diarrhea.    : Denies dysuria and urinary frequency.    MSK: Denies myalgia and joint pain.  Positive for right knee pain/swelling    SKIN: Denies rash and pruritus.    VASC: Denies claudication, ischemic rest pain, or open wounds or sores.  Positive for significant hematoma right medial thigh/knee    NEUROLOGICAL: Denies headache and syncope.    PSYCHIATRIC: Denies recent changes in mood. Denies anxiety and depression.     Physical Exam  General: Pt is alert and oriented x 3.  "Pleasant, conversive  HEENT: Head is atraumatic, normocephalic.   Cardiac: Normal S1-S2.  Regular rate and rhythm.  No murmurs.  Respiratory: Lungs clear to auscultation.  No adventitious sounds.  Abdomen: Soft, nondistended, nontender.  Bowel sounds x4 quadrants.  Protuberant, morbidly obese  Pulse exam: Palpable brachial and radial pulses bilaterally.   Femoral, popliteal, pedal pulses are palpable bilaterally.  Extremities: 2+ pitting edema right lower extremity.  There is significant ecchymosis and induration to right medial thigh area.  There is a fluid-filled bulla overlying the hematoma.  Neuro: Moves all extremities spontaneously.  No focal deficits.  Psych: Appropriate affect.  Answers questions appropriately.         Last Recorded Vitals  Blood pressure 117/75, pulse 73, temperature 36.3 °C (97.3 °F), temperature source Temporal, resp. rate 20, height 1.727 m (5' 8\"), weight 131 kg (288 lb 5.8 oz), SpO2 93%.    Relevant Results  CT angio lower extremity right w and or wo IV contrast    Result Date: 4/9/2025  STUDY: CT CTA LOWER EXTREMITY RIGHT; 04/09/2025 06:01 PM INDICATION:  Right lower extremity hematoma, evaluate for vessel dissection. COMPARISON: CT Knee 04/09/2025; XR Femur 04/09/2025. ACCESSION NUMBER(S): [id number] ORDERING CLINICIAN: TECHNIQUE:  Helical CT is performed from the infrarenal aorta through the feet after bolus administration of 120 mL of Omnipaque 350. Images are reviewed and processed at a workstation according to the CT angiogram protocol with 3-D and/or MIP post processing imaging generated.  2236 DICOM images received. Automated mA/kV exposure control was utilized and patient examination was performed in strict accordance with principles of ALARA. FINDINGS: No stenoses are seen within the visualized portions of the right superficial femoral artery, or within the right popliteal artery.  No intimal flap is visualized to indicate a dissection.  There are small contrast blush is " seen in the hematomas in the medial aspect of the distal right thigh, and anterior medial aspect of the right knee.  It is indeterminate whether or not there are branches from the profunda femoral artery or the geniculate branches from the popliteal artery are contributing to these hematomas.    1.  Contrast blushing is in the two hematomas located in the medial aspect of the distal right thigh and anteromedial aspect of the right knee, suggestive of an arterial bleed.  It is indeterminate whether or not these are contributed by branches of the profunda femoral artery, or the geniculate arteries. 2.  No stenoses or dissections within the visualized portions of the right superficial femoral artery, or right popliteal artery. This report was called to Dr. Ricardo Cook at 7:40 PM on April 9, 2025. Signed by Nik Hart MD    XR hand right 3+ views    Result Date: 4/9/2025  Interpreted By:  Ludwig Briggs, STUDY: XR HAND RIGHT 3+ VIEWS   INDICATION: Signs/Symptoms:right hand pain.   COMPARISON: None   ACCESSION NUMBER(S): [id number]   ORDERING CLINICIAN: QIAN DELAROSA   FINDINGS: Mild osteoarthritis right hand more advanced at the 1st [facility]. No evidence of fracture or malalignment       Osteoarthritis right hand most prominent at the 1st [facility].   Signed by: Ludwig Briggs 4/9/2025 5:28 PM Dictation workstation:   [id number]    CT knee right wo IV contrast    Result Date: 4/9/2025  STUDY: CT Extremity; Completed Time:  4/9/2025 3:12 PM INDICATION: Evaluate for soft tissue hematoma. COMPARISON: XR RT femur 4/9/2025. ACCESSION NUMBER(S): [id number] ORDERING CLINICIAN: QIAN DELAROSA TECHNIQUE:  Thin section axial images were obtained through the right knee without intravenous contrast.  Orthogonal reconstructed images were obtained and reviewed.  Automated mA/kV exposure control was utilized and patient examination was performed in strict accordance with principles of ALARA. FINDINGS: No acute fractures or dislocations are  visualized within the right knee.  There is no evidence of patellar subluxation.  No suprapatellar effusion is appreciated.  There is prominent soft tissue swelling over the anterior aspect of the knee.  There is a hyperdense fluid collection, indicative of a hematoma.  It is estimated at 14.5 x 9.9 x 5.2 cm.  There is impingement noted upon the vastus medialis of the quadriceps muscle.  However, this does not appear to involve the musculature.  There is a second hematoma over the medial aspect the distal thigh, measured at 11.7 x 8.6 x 2.7 cm.  There is generalized subcutaneous edema over the knee.    1.  Large hematoma over the anteromedial aspect of the right knee. 2.  Additional hematoma over the medial aspect of the distal right thigh. 3.  No acute osseous findings involving the right knee. 4.  No prominent hematoma appreciated within the surrounding musculature.  5.  Subcutaneous edema overlying the right knee. Signed by Nik Hart MD    XR femur right 2+ views    Result Date: 4/9/2025  STUDY: Femur Radiographs; 4/9/2025 12:50 PM INDICATION: Right thigh pain. COMPARISON: None Available. ACCESSION NUMBER(S): [id number] ORDERING CLINICIAN: QIAN DELAROSA TECHNIQUE:  Two view(s) of the right femur (five images). FINDINGS:  There is no displaced fracture.  The alignment is anatomic.  Narrowing of the right hip joint space.  Prominent soft tissue swelling of the distal thigh and knee.    No acute bone or joint abnormality. Soft tissue swelling. DJD. Signed by Deniz Benson MD    XR knee right 4+ views    Result Date: 4/9/2025  Interpreted By:  Ca Viera, STUDY: XR KNEE RIGHT 4+ VIEWS;  4/9/2025 12:45 pm   INDICATION: Signs/Symptoms:right knee pain.     COMPARISON: None.   ACCESSION NUMBER(S): [id number]   ORDERING CLINICIAN: QIAN DELAROSA   FINDINGS: Five views of the right knee obtained.   No acute fracture or osseous displacement. Mild medial compartment narrowing. Minimal lateral and patellofemoral  compartment spurring. Prominent anterior/prepatellar soft tissue swelling with possible subtle nodularity. Faint linear presumed vascular calcifications in the posterior soft tissues       No definite acute osseous finding. Mild degenerative changes. Prominent anterior soft tissue swelling with possible nodularity.   MACRO: None.   Signed by: Ca Viera 4/9/2025 12:56 PM Dictation workstation:   [id number]    CT head wo IV contrast    Result Date: 4/9/2025  Interpreted By:  Adal Landeros, STUDY: CT HEAD WO IV CONTRAST;  4/9/2025 12:42 pm   INDICATION: Signs/Symptoms:fall, hit head.   COMPARISON: None.   ACCESSION NUMBER(S): [id number]   ORDERING CLINICIAN: QIAN DELAROSA   TECHNIQUE: Noncontrast axial CT scan of head was performed. Angled reformats in brain and bone windows were generated. The images were reviewed in bone, brain, blood and soft tissue windows.   FINDINGS: CSF Spaces: The ventricles, sulci and basal cisterns are within normal limits. There is no extraaxial fluid collection.   Parenchyma: Moderate parenchymal atrophy. Periventricular and subcortical white matter hypoattenuation is nonspecific, however likely reflects chronic microvascular ischemic versus chronic hypertensive changes. The grey-white differentiation is intact. There is no mass effect or midline shift.  There is no intracranial hemorrhage. Minimal density along the periphery of the bifrontal regions (series 4, images 20 9-38) is favored to be secondary to a mixture of motion artifact and localized beam hardening artifact   Calvarium: No acute displaced calvarial fracture. Infiltration of the right frontal scalp suggestive of localized soft tissue injury.   Paranasal sinuses and mastoids: Mastoid air cells appear clear. 2.4 x 2.0 x 2.8 cm rounded cystic lesion demonstrating peripheral calcification in the region peripheral to the fossa of Rosenmuller. Presumed cerumen within the bilateral external auditory canals. Mild mucosal  thickening of the bilateral ethmoid and right frontal sinus.       No evidence of acute cortical infarct or intracranial hemorrhage.   2.4 x 2.0 x 2.8 cm rounded cystic lesion containing peripheral calcification in the region peripheral to the fossa of Rosenmuller. Follow-up nonemergent MRI soft tissue neck protocol advised for further assessment.   MACRO: None     Signed by: Adal Landeros 4/9/2025 12:53 PM Dictation workstation:   [id number]      Results for orders placed or performed during the hospital encounter of 04/09/25 (from the past 24 hours)   CBC and Auto Differential   Result Value Ref Range    WBC 8.9 4.4 - 11.3 x10*3/uL    nRBC 0.0 0.0 - 0.0 /100 WBCs    RBC 3.81 (L) 4.50 - 5.90 x10*6/uL    Hemoglobin 11.6 (L) 13.5 - 17.5 g/dL    Hematocrit 35.5 (L) 41.0 - 52.0 %    MCV 93 80 - 100 fL    MCH 30.4 26.0 - 34.0 pg    MCHC 32.7 32.0 - 36.0 g/dL    RDW 14.0 11.5 - 14.5 %    Platelets 204 150 - 450 x10*3/uL    Neutrophils % 72.1 40.0 - 80.0 %    Immature Granulocytes %, Automated 1.7 (H) 0.0 - 0.9 %    Lymphocytes % 16.3 13.0 - 44.0 %    Monocytes % 8.5 2.0 - 10.0 %    Eosinophils % 0.8 0.0 - 6.0 %    Basophils % 0.6 0.0 - 2.0 %    Neutrophils Absolute 6.44 1.20 - 7.70 x10*3/uL    Immature Granulocytes Absolute, Automated 0.15 0.00 - 0.70 x10*3/uL    Lymphocytes Absolute 1.45 1.20 - 4.80 x10*3/uL    Monocytes Absolute 0.76 0.10 - 1.00 x10*3/uL    Eosinophils Absolute 0.07 0.00 - 0.70 x10*3/uL    Basophils Absolute 0.05 0.00 - 0.10 x10*3/uL   Comprehensive metabolic panel   Result Value Ref Range    Glucose 102 (H) 74 - 99 mg/dL    Sodium 141 136 - 145 mmol/L    Potassium 4.3 3.5 - 5.3 mmol/L    Chloride 111 (H) 98 - 107 mmol/L    Bicarbonate 24 21 - 32 mmol/L    Anion Gap 10 10 - 20 mmol/L    Urea Nitrogen 55 (H) 6 - 23 mg/dL    Creatinine 2.00 (H) 0.50 - 1.30 mg/dL    eGFR 37 (L) >60 mL/min/1.73m*2    Calcium 8.2 (L) 8.6 - 10.3 mg/dL    Albumin 3.2 (L) 3.4 - 5.0 g/dL    Alkaline Phosphatase 47 33 - 136  U/L    Total Protein 5.5 (L) 6.4 - 8.2 g/dL    AST 13 9 - 39 U/L    Bilirubin, Total 0.3 0.0 - 1.2 mg/dL    ALT 18 10 - 52 U/L   Protime-INR   Result Value Ref Range    Protime 11.2 9.3 - 12.7 seconds    INR 1.0 0.9 - 1.2   APTT   Result Value Ref Range    aPTT 24.1 22.0 - 32.5 seconds   Urinalysis with Reflex Culture and Microscopic   Result Value Ref Range    Color, Urine Colorless (N) Light-Yellow, Yellow, Dark-Yellow    Appearance, Urine Clear Clear    Specific Gravity, Urine 1.017 1.005 - 1.035    pH, Urine 6.0 5.0, 5.5, 6.0, 6.5, 7.0, 7.5, 8.0    Protein, Urine 300 (3+) (A) NEGATIVE, 10 (TRACE), 20 (TRACE) mg/dL    Glucose, Urine 30 (TRACE) (A) Normal mg/dL    Blood, Urine 0.06 (1+) (A) NEGATIVE mg/dL    Ketones, Urine NEGATIVE NEGATIVE mg/dL    Bilirubin, Urine NEGATIVE NEGATIVE mg/dL    Urobilinogen, Urine Normal Normal mg/dL    Nitrite, Urine NEGATIVE NEGATIVE    Leukocyte Esterase, Urine NEGATIVE NEGATIVE   Extra Urine Gray Tube   Result Value Ref Range    Extra Tube Hold for add-ons.    Urinalysis Microscopic   Result Value Ref Range    WBC, Urine 1-5 1-5, NONE /HPF    RBC, Urine 1-2 NONE, 1-2, 3-5 /HPF    Mucus, Urine FEW Reference range not established. /LPF   PST Top   Result Value Ref Range    Extra Tube Hold for add-ons.    Hemoglobin and hematocrit, blood   Result Value Ref Range    Hemoglobin 10.4 (L) 13.5 - 17.5 g/dL    Hematocrit 32.2 (L) 41.0 - 52.0 %         Assessment/Plan   Right medial thigh/knee hematoma  Blood loss anemia    Patient with a traumatic hematoma secondary to a fall that he sustained yesterday 4/9/2025.  He has a rather significant hematoma to the medial aspect of the thigh/knee.  There is concern for skin compromise as a large fluid-filled bulla has formed overlying the hematoma.  Additionally, CT angiogram was reviewed in detail which noted blushing in the hematoma, concerning for active extravasation.    Will plan for a arterial duplex to assess for active extravasation in  the hematoma as well as repeat hemoglobin at noon.    Also, have discussed this patient in detail with Dr. Tao.  He may need to surgery later this afternoon for hematoma evacuation, especially if the hemoglobin continues to decrease.  Make NPO      I spent 45 minutes in the professional and overall care of this patient.      Andrey Egan, APRN-CNP

## 2025-04-10 NOTE — H&P
"  ACUTE CARE SURGERY HISTORY AND PHYSICAL  4/10/2025   [id number]     HPI: P)nick fell while running. He lost his balance and fell onto concrete. No blood thinners. Takes baby aspirin daily. He hit his right knee and his head. No LOC. Initially, he was able to walk but then the knee started to swell    ASSESSMENT & PLAN:  /80 (BP Location: Left arm, Patient Position: Lying)   Pulse 74   Temp 36.2 °C (97.2 °F) (Temporal)   Resp 20   Ht 1.727 m (5' 8\")   Wt 131 kg (288 lb 5.8 oz)   SpO2 93%   BMI 43.85 kg/m²   Problem/Injury Exam and Plan Resolved   R knee hematoma and skin blistering Pictures in chart  Continue Q6 hemaglobin  Check EKG for pre-op  Will follow up with vascular surgery regarding hematoma explorationif HGB continues to decline  Monitor skin  npo                                                      Heart Rate:  [73-81]   Temp:  [36.2 °C (97.2 °F)-36.5 °C (97.7 °F)]   Resp:  [15-20]   BP: (113-187)/()   Height:  [172.7 cm (5' 8\")]   Weight:  [131 kg (288 lb 5.8 oz)-131 kg (289 lb 7.4 oz)]   SpO2:  [90 %-95 %]      No family history on file.     No past medical history on file. Copd and kidney disease, and \"issues with his heart\" no stents    Past Surgical History:   Procedure Laterality Date    CT ANGIO CORONARY ART WITH HEARTFLOW IF SCORE >30%  7/27/2020    CT HEART CORONARY ANGIOGRAM 7/27/2020 [facility]    Kidney stone surgery x2    Social History     Socioeconomic History    Marital status:      Spouse name: Not on file    Number of children: Not on file    Years of education: Not on file    Highest education level: Not on file   Occupational History    Not on file   Tobacco Use    Smoking status: Never    Smokeless tobacco: Never   Substance and Sexual Activity    Alcohol use: Not on file    Drug use: Not on file    Sexual activity: Not on file   Other Topics Concern    Not on file   Social History Narrative    Not on file     Social Drivers of Health     Financial " Resource Strain: Medium Risk (4/9/2025)    Overall Financial Resource Strain (CARDIA)     Difficulty of Paying Living Expenses: Somewhat hard   Food Insecurity: No Food Insecurity (4/9/2025)    Hunger Vital Sign     Worried About Running Out of Food in the Last Year: Never true     Ran Out of Food in the Last Year: Never true   Transportation Needs: No Transportation Needs (4/9/2025)    PRAPARE - Transportation     Lack of Transportation (Medical): No     Lack of Transportation (Non-Medical): No   Physical Activity: Inactive (4/9/2025)    Exercise Vital Sign     Days of Exercise per Week: 0 days     Minutes of Exercise per Session: 0 min   Stress: No Stress Concern Present (4/9/2025)    [employer] - Occupational Stress Questionnaire     Feeling of Stress : Not at all   Social Connections: Socially Integrated (4/9/2025)    Social Connection and Isolation Panel [NHANES]     Frequency of Communication with Friends and Family: More than three times a week     Frequency of Social Gatherings with Friends and Family: Three times a week     Attends [employer] Services: More than 4 times per year     Active Member of Clubs or Organizations: Yes     Attends Club or Organization Meetings: More than 4 times per year     Marital Status:    Intimate Partner Violence: Not At Risk (4/9/2025)    Humiliation, Afraid, Rape, and Kick questionnaire     Fear of Current or Ex-Partner: No     Emotionally Abused: No     Physically Abused: No     Sexually Abused: No   Housing Stability: Low Risk  (4/9/2025)    Housing Stability Vital Sign     Unable to Pay for Housing in the Last Year: No     Number of Times Moved in the Last Year: 0     Homeless in the Last Year: No       Review of Systems   Constitutional:  Negative for chills, diaphoresis and fever.   Cardiovascular:  Positive for leg swelling. Negative for chest pain.        Wears compression     Gastrointestinal:  Negative for constipation and  "diarrhea.   Neurological:  Negative for dizziness and syncope.   Hematological:  Bruises/bleeds easily.        States easy bruising for last couple years        Blood pressure 129/80, pulse 74, temperature 36.2 °C (97.2 °F), temperature source Temporal, resp. rate 20, height 1.727 m (5' 8\"), weight 131 kg (288 lb 5.8 oz), SpO2 93%.     Physical Exam  Constitutional:       General: He is not in acute distress.     Appearance: He is obese. He is not ill-appearing or toxic-appearing.   Cardiovascular:      Comments: 1+ dp bilat  Pulmonary:      Effort: Pulmonary effort is normal. No respiratory distress.   Abdominal:      General: There is no distension.      Palpations: Abdomen is soft.   Musculoskeletal:         General: Tenderness present.   Skin:     Findings: Bruising present.      Comments: Reight forehead ecchynosis and abrasion=small  Right leg wrapped. No ROM attempted.  Tenderness present midthigh down   Neurological:      Mental Status: He is alert and oriented to person, place, and time.      Sensory: No sensory deficit.   Psychiatric:         Mood and Affect: Mood normal.         Behavior: Behavior normal.         Thought Content: Thought content normal.            Intake/Output Summary (Last 24 hours) at 4/10/2025 1241  Last data filed at 4/10/2025 0903  Gross per 24 hour   Intake 1090 ml   Output 300 ml   Net 790 ml       CBC, elctrolytes/BMP and pertinant imaging, CT leg, reviewed.  Labs:   Results from last 72 hours   Lab Units 04/09/25  1657   SODIUM mmol/L 141   POTASSIUM mmol/L 4.3   CHLORIDE mmol/L 111*   CO2 mmol/L 24   BUN mg/dL 55*   CREATININE mg/dL 2.00*   GLUCOSE mg/dL 102*   CALCIUM mg/dL 8.2*   ANION GAP mmol/L 10   EGFR mL/min/1.73m*2 37*      Results from last 72 hours   Lab Units 04/10/25  1146 04/10/25  0505 04/09/25  1657   WBC AUTO x10*3/uL  --   --  8.9   HEMOGLOBIN g/dL 10.2* 10.4* 11.6*   HEMATOCRIT % 32.0* 32.2* 35.5*   PLATELETS AUTO x10*3/uL  --   --  204   NEUTROS PCT AUTO %  " --   --  72.1   LYMPHS PCT AUTO %  --   --  16.3   MONOS PCT AUTO %  --   --  8.5   EOS PCT AUTO %  --   --  0.8                 No Known Allergies    Current Facility-Administered Medications:     acetaminophen (Tylenol) tablet 650 mg, 650 mg, oral, q4h PRN **OR** acetaminophen (Tylenol) oral liquid 650 mg, 650 mg, nasogastric tube, q4h PRN **OR** acetaminophen (Tylenol) suppository 650 mg, 650 mg, rectal, q4h PRN, Nydia Kruger MD    morphine injection 1 mg, 1 mg, intravenous, q4h PRN, Nydia Kruger MD, 1 mg at 04/10/25 0459    ondansetron ODT (Zofran-ODT) disintegrating tablet 4 mg, 4 mg, oral, q6h PRN **OR** ondansetron (Zofran) injection 4 mg, 4 mg, intravenous, q8h PRN, Nydia Kruger MD    oxyCODONE (Roxicodone) immediate release tablet 5 mg, 5 mg, oral, q4h PRN, Nydia Kruger MD, 5 mg at 04/10/25 1230    polyethylene glycol (Glycolax, Miralax) packet 17 g, 17 g, oral, Daily, Nydia Kruger MD  Prior to Admission medications    Medication Sig Start Date End Date Taking? Authorizing Provider   aspirin 81 mg EC tablet Take 1 tablet (81 mg) by mouth once daily.   Yes Historical Provider, MD   budesonide-glycopyr-formoterol (Breztri Aerosphere) 160-9-4.8 mcg/actuation HFA aerosol inhaler Inhale 2 puffs 2 times a day.   Yes Historical Provider, MD   carvedilol (Coreg) 25 mg tablet Take 1 tablet (25 mg) by mouth 2 times daily (morning and late afternoon).   Yes Historical Provider, MD   furosemide (Lasix) 40 mg tablet Take 1 tablet (40 mg) by mouth once daily. 4/20/24  Yes Historical Provider, MD   albuterol 90 mcg/actuation inhaler Inhale 2 puffs every 6 hours if needed for wheezing or shortness of breath. 2/16/25   Maya Williamson MD   azithromycin (Zithromax) 250 mg tablet Take 2 tabs (500 mg) by mouth today, then take 1 tab daily for 4 days.  Patient not taking: Reported on 4/9/2025 2/11/25   MATTHEW Dubose-CNP   ketoconazole (NIZOral) 2 % cream Apply 1 Application topically if  needed. 5/3/21   Historical Provider, MD   albuterol 90 mcg/actuation inhaler Inhale 2 puffs every 4 hours if needed.  4/9/25  Historical Provider, MD Ortiz 160-4.5 mcg/actuation inhaler Inhale 2 puffs 2 times a day. 5/31/24 4/9/25  Historical Provider, MD   Dulera 200-5 mcg/actuation inhaler Inhale 2 puffs 2 times a day. 10/29/23 4/9/25  Historical Provider, MD   polyethylene glycol (Miralax) 17 gram/dose powder Take 17 g by mouth. 2/28/23 4/9/25  Historical Provider, MD   simvastatin (Zocor) 40 mg tablet once every 24 hours.  4/9/25  Historical Provider, MD   terbinafine (LamISIL) 250 mg tablet 1 Tablet 5/3/21 4/9/25  Historical Provider, MD   triamcinolone (Kenalog) 0.1 % cream 1 Application 12/20/22 4/9/25  Historical Provider, MD Nydia Kruger MD   4/10/2025, 12:41 PM

## 2025-04-11 LAB
ABO GROUP (TYPE) IN BLOOD: NORMAL
ANION GAP SERPL CALCULATED.3IONS-SCNC: 11 MMOL/L (ref 10–20)
ANTIBODY SCREEN: NORMAL
BLOOD EXPIRATION DATE: NORMAL
BLOOD EXPIRATION DATE: NORMAL
BUN SERPL-MCNC: 44 MG/DL (ref 6–23)
CALCIUM SERPL-MCNC: 7.7 MG/DL (ref 8.6–10.3)
CHLORIDE SERPL-SCNC: 110 MMOL/L (ref 98–107)
CO2 SERPL-SCNC: 24 MMOL/L (ref 21–32)
CREAT SERPL-MCNC: 2.26 MG/DL (ref 0.5–1.3)
DISPENSE STATUS: NORMAL
DISPENSE STATUS: NORMAL
EGFRCR SERPLBLD CKD-EPI 2021: 32 ML/MIN/1.73M*2
ERYTHROCYTE [DISTWIDTH] IN BLOOD BY AUTOMATED COUNT: 13.9 % (ref 11.5–14.5)
GLUCOSE SERPL-MCNC: 135 MG/DL (ref 74–99)
HCT VFR BLD AUTO: 30 % (ref 41–52)
HGB BLD-MCNC: 9.7 G/DL (ref 13.5–17.5)
HOLD SPECIMEN: NORMAL
HOLD SPECIMEN: NORMAL
MAGNESIUM SERPL-MCNC: 2.03 MG/DL (ref 1.6–2.4)
MCH RBC QN AUTO: 29.7 PG (ref 26–34)
MCHC RBC AUTO-ENTMCNC: 32.3 G/DL (ref 32–36)
MCV RBC AUTO: 92 FL (ref 80–100)
NRBC BLD-RTO: 0 /100 WBCS (ref 0–0)
PHOSPHATE SERPL-MCNC: 3.6 MG/DL (ref 2.5–4.9)
PLATELET # BLD AUTO: 182 X10*3/UL (ref 150–450)
POTASSIUM SERPL-SCNC: 4.6 MMOL/L (ref 3.5–5.3)
PRODUCT BLOOD TYPE: 5100
PRODUCT BLOOD TYPE: 5100
PRODUCT CODE: NORMAL
PRODUCT CODE: NORMAL
RBC # BLD AUTO: 3.27 X10*6/UL (ref 4.5–5.9)
RH FACTOR (ANTIGEN D): NORMAL
SODIUM SERPL-SCNC: 140 MMOL/L (ref 136–145)
UNIT ABO: NORMAL
UNIT ABO: NORMAL
UNIT NUMBER: NORMAL
UNIT NUMBER: NORMAL
UNIT RH: NORMAL
UNIT RH: NORMAL
UNIT VOLUME: 350
UNIT VOLUME: 350
WBC # BLD AUTO: 9.9 X10*3/UL (ref 4.4–11.3)
XM INTEP: NORMAL
XM INTEP: NORMAL

## 2025-04-11 PROCEDURE — 84100 ASSAY OF PHOSPHORUS: CPT | Performed by: PHYSICIAN ASSISTANT

## 2025-04-11 PROCEDURE — 86901 BLOOD TYPING SEROLOGIC RH(D): CPT | Performed by: STUDENT IN AN ORGANIZED HEALTH CARE EDUCATION/TRAINING PROGRAM

## 2025-04-11 PROCEDURE — 86900 BLOOD TYPING SEROLOGIC ABO: CPT | Performed by: STUDENT IN AN ORGANIZED HEALTH CARE EDUCATION/TRAINING PROGRAM

## 2025-04-11 PROCEDURE — 2500000001 HC RX 250 WO HCPCS SELF ADMINISTERED DRUGS (ALT 637 FOR MEDICARE OP): Performed by: SURGERY

## 2025-04-11 PROCEDURE — 2500000004 HC RX 250 GENERAL PHARMACY W/ HCPCS (ALT 636 FOR OP/ED): Performed by: ANESTHESIOLOGY

## 2025-04-11 PROCEDURE — 2060000001 HC INTERMEDIATE ICU ROOM DAILY

## 2025-04-11 PROCEDURE — 2500000004 HC RX 250 GENERAL PHARMACY W/ HCPCS (ALT 636 FOR OP/ED): Performed by: NURSE PRACTITIONER

## 2025-04-11 PROCEDURE — 83735 ASSAY OF MAGNESIUM: CPT | Performed by: PHYSICIAN ASSISTANT

## 2025-04-11 PROCEDURE — 85027 COMPLETE CBC AUTOMATED: CPT | Performed by: PHYSICIAN ASSISTANT

## 2025-04-11 PROCEDURE — 80048 BASIC METABOLIC PNL TOTAL CA: CPT | Performed by: PHYSICIAN ASSISTANT

## 2025-04-11 PROCEDURE — 2500000004 HC RX 250 GENERAL PHARMACY W/ HCPCS (ALT 636 FOR OP/ED): Performed by: SURGERY

## 2025-04-11 PROCEDURE — 86923 COMPATIBILITY TEST ELECTRIC: CPT

## 2025-04-11 PROCEDURE — 36415 COLL VENOUS BLD VENIPUNCTURE: CPT | Performed by: STUDENT IN AN ORGANIZED HEALTH CARE EDUCATION/TRAINING PROGRAM

## 2025-04-11 PROCEDURE — 99232 SBSQ HOSP IP/OBS MODERATE 35: CPT | Performed by: SURGERY

## 2025-04-11 RX ORDER — CEFAZOLIN SODIUM 2 G/50ML
2 SOLUTION INTRAVENOUS EVERY 12 HOURS
Status: DISCONTINUED | OUTPATIENT
Start: 2025-04-11 | End: 2025-04-14

## 2025-04-11 RX ADMIN — OXYCODONE HYDROCHLORIDE 5 MG: 5 TABLET ORAL at 09:45

## 2025-04-11 RX ADMIN — OXYCODONE HYDROCHLORIDE 5 MG: 5 TABLET ORAL at 05:49

## 2025-04-11 RX ADMIN — OXYCODONE HYDROCHLORIDE 5 MG: 5 TABLET ORAL at 14:36

## 2025-04-11 RX ADMIN — SODIUM CHLORIDE, SODIUM LACTATE, POTASSIUM CHLORIDE, AND CALCIUM CHLORIDE 100 ML/HR: 600; 310; 30; 20 INJECTION, SOLUTION INTRAVENOUS at 02:53

## 2025-04-11 RX ADMIN — OXYCODONE HYDROCHLORIDE 5 MG: 5 TABLET ORAL at 00:19

## 2025-04-11 RX ADMIN — OXYCODONE HYDROCHLORIDE 5 MG: 5 TABLET ORAL at 19:18

## 2025-04-11 RX ADMIN — CEFAZOLIN SODIUM 2 G: 2 SOLUTION INTRAVENOUS at 14:32

## 2025-04-11 RX ADMIN — HYDROMORPHONE HYDROCHLORIDE 0.2 MG: 1 INJECTION, SOLUTION INTRAMUSCULAR; INTRAVENOUS; SUBCUTANEOUS at 10:31

## 2025-04-11 ASSESSMENT — COGNITIVE AND FUNCTIONAL STATUS - GENERAL
PERSONAL GROOMING: A LITTLE
DRESSING REGULAR UPPER BODY CLOTHING: A LITTLE
DAILY ACTIVITIY SCORE: 20
WALKING IN HOSPITAL ROOM: A LITTLE
HELP NEEDED FOR BATHING: A LITTLE
CLIMB 3 TO 5 STEPS WITH RAILING: A LITTLE
MOBILITY SCORE: 22
DRESSING REGULAR UPPER BODY CLOTHING: A LITTLE
WALKING IN HOSPITAL ROOM: A LITTLE
MOBILITY SCORE: 22
CLIMB 3 TO 5 STEPS WITH RAILING: A LITTLE
HELP NEEDED FOR BATHING: A LITTLE
DAILY ACTIVITIY SCORE: 20
DRESSING REGULAR LOWER BODY CLOTHING: A LITTLE
PERSONAL GROOMING: A LITTLE
DRESSING REGULAR LOWER BODY CLOTHING: A LITTLE

## 2025-04-11 ASSESSMENT — PAIN - FUNCTIONAL ASSESSMENT
PAIN_FUNCTIONAL_ASSESSMENT: 0-10

## 2025-04-11 ASSESSMENT — PAIN SCALES - PAIN ASSESSMENT IN ADVANCED DEMENTIA (PAINAD): TOTALSCORE: MEDICATION (SEE MAR)

## 2025-04-11 ASSESSMENT — ACTIVITIES OF DAILY LIVING (ADL): LACK_OF_TRANSPORTATION: NO

## 2025-04-11 ASSESSMENT — PAIN SCALES - GENERAL
PAINLEVEL_OUTOF10: 7
PAINLEVEL_OUTOF10: 7
PAINLEVEL_OUTOF10: 5 - MODERATE PAIN
PAINLEVEL_OUTOF10: 1
PAINLEVEL_OUTOF10: 3
PAINLEVEL_OUTOF10: 6
PAINLEVEL_OUTOF10: 6
PAINLEVEL_OUTOF10: 10 - WORST POSSIBLE PAIN
PAINLEVEL_OUTOF10: 3

## 2025-04-11 NOTE — ANESTHESIA POSTPROCEDURE EVALUATION
Patient: Colten Eason    Procedure Summary       Date: 04/10/25 Room / Location: [facility] OR 05 / [facility]    Anesthesia Start: 1438 Anesthesia Stop: 1551    Procedure: EVACUATION, HEMATOMA, THIGH (Right: Thigh - Leg Upper) Diagnosis:       Hematoma      (Hematoma [T14.8XXA])    Surgeons: Mook Tao MD Responsible Provider: Dejon Flynn MD    Anesthesia Type: general ASA Status: 3            Anesthesia Type: general    Vitals Value Taken Time   /83 04/10/25 1630   Temp 36.2 °C (97.2 °F) 04/10/25 1547   Pulse 85 04/10/25 1630   Resp 18 04/10/25 1630   SpO2 92 % 04/10/25 1620       Anesthesia Post Evaluation    Patient location during evaluation: PACU  Patient participation: complete - patient participated  Level of consciousness: awake and alert  Pain score: 7  Pain management: inadequate  Multimodal analgesia pain management approach  Airway patency: patent  Two or more strategies used to mitigate risk of obstructive sleep apnea  Cardiovascular status: acceptable and blood pressure returned to baseline  Respiratory status: acceptable  Hydration status: acceptable  Postoperative Nausea and Vomiting: none  Comments: Working to achieve adequate pain control.        There were no known notable events for this encounter.

## 2025-04-11 NOTE — CARE PLAN
The patient's goals for the shift include Patient Safety    The clinical goals for the shift include pain control      Problem: Pain - Adult  Goal: Verbalizes/displays adequate comfort level or baseline comfort level  Outcome: Progressing     Problem: Safety - Adult  Goal: Free from fall injury  Outcome: Progressing     Problem: Discharge Planning  Goal: Discharge to home or other facility with appropriate resources  Outcome: Progressing     Problem: Chronic Conditions and Co-morbidities  Goal: Patient's chronic conditions and co-morbidity symptoms are monitored and maintained or improved  Outcome: Progressing     Problem: Nutrition  Goal: Nutrient intake appropriate for maintaining nutritional needs  Outcome: Progressing     Problem: Skin  Goal: Decreased wound size/increased tissue granulation at next dressing change  Outcome: Progressing  Goal: Participates in plan/prevention/treatment measures  Outcome: Progressing  Goal: Prevent/manage excess moisture  Outcome: Progressing  Goal: Prevent/minimize sheer/friction injuries  Outcome: Progressing  Goal: Promote/optimize nutrition  Outcome: Progressing  Goal: Promote skin healing  Outcome: Progressing     Problem: Fall/Injury  Goal: Not fall by end of shift  Outcome: Progressing  Goal: Be free from injury by end of the shift  Outcome: Progressing  Goal: Verbalize understanding of personal risk factors for fall in the hospital  Outcome: Progressing  Goal: Verbalize understanding of risk factor reduction measures to prevent injury from fall in the home  Outcome: Progressing  Goal: Use assistive devices by end of the shift  Outcome: Progressing  Goal: Pace activities to prevent fatigue by end of the shift  Outcome: Progressing     Problem: Pain  Goal: Takes deep breaths with improved pain control throughout the shift  Outcome: Progressing  Goal: Turns in bed with improved pain control throughout the shift  Outcome: Progressing  Goal: Walks with improved pain control  throughout the shift  Outcome: Progressing  Goal: Performs ADL's with improved pain control throughout shift  Outcome: Progressing  Goal: Participates in PT with improved pain control throughout the shift  Outcome: Progressing  Goal: Free from opioid side effects throughout the shift  Outcome: Progressing  Goal: Free from acute confusion related to pain meds throughout the shift  Outcome: Progressing

## 2025-04-11 NOTE — CARE PLAN
Problem: Pain - Adult  Goal: Verbalizes/displays adequate comfort level or baseline comfort level  Outcome: Progressing     Problem: Safety - Adult  Goal: Free from fall injury  Outcome: Progressing     Problem: Discharge Planning  Goal: Discharge to home or other facility with appropriate resources  Outcome: Progressing     Problem: Chronic Conditions and Co-morbidities  Goal: Patient's chronic conditions and co-morbidity symptoms are monitored and maintained or improved  Outcome: Progressing     Problem: Nutrition  Goal: Nutrient intake appropriate for maintaining nutritional needs  Outcome: Progressing     Problem: Skin  Goal: Decreased wound size/increased tissue granulation at next dressing change  Outcome: Progressing  Goal: Participates in plan/prevention/treatment measures  Outcome: Progressing  Goal: Prevent/manage excess moisture  Outcome: Progressing  Goal: Prevent/minimize sheer/friction injuries  Outcome: Progressing  Goal: Promote/optimize nutrition  Outcome: Progressing  Goal: Promote skin healing  Outcome: Progressing     Problem: Fall/Injury  Goal: Not fall by end of shift  Outcome: Progressing  Goal: Be free from injury by end of the shift  Outcome: Progressing  Goal: Verbalize understanding of personal risk factors for fall in the hospital  Outcome: Progressing  Goal: Verbalize understanding of risk factor reduction measures to prevent injury from fall in the home  Outcome: Progressing  Goal: Use assistive devices by end of the shift  Outcome: Progressing  Goal: Pace activities to prevent fatigue by end of the shift  Outcome: Progressing     Problem: Pain  Goal: Takes deep breaths with improved pain control throughout the shift  Outcome: Progressing  Goal: Turns in bed with improved pain control throughout the shift  Outcome: Progressing  Goal: Walks with improved pain control throughout the shift  Outcome: Progressing  Goal: Performs ADL's with improved pain control throughout shift  Outcome:  Progressing  Goal: Participates in PT with improved pain control throughout the shift  Outcome: Progressing  Goal: Free from opioid side effects throughout the shift  Outcome: Progressing  Goal: Free from acute confusion related to pain meds throughout the shift  Outcome: Progressing

## 2025-04-11 NOTE — PROGRESS NOTES
04/11/25 1656   Discharge Planning   Living Arrangements Spouse/significant other   Support Systems Spouse/significant other   Assistance Needed none   Type of Residence Private residence   Number of Stairs to Enter Residence 3   Number of Stairs Within Residence 12   Do you have animals or pets at home? Yes   Type of Animals or Pets 1 dog   Home or Post Acute Services None   Expected Discharge Disposition Home   Does the patient need discharge transport arranged? No   Financial Resource Strain   How hard is it for you to pay for the very basics like food, housing, medical care, and heating? Somewhat   Housing Stability   In the last 12 months, was there a time when you were not able to pay the mortgage or rent on time? N   In the past 12 months, how many times have you moved where you were living? 0   At any time in the past 12 months, were you homeless or living in a shelter (including now)? N   Transportation Needs   In the past 12 months, has lack of transportation kept you from medical appointments or from getting medications? no   In the past 12 months, has lack of transportation kept you from meetings, work, or from getting things needed for daily living? No   Stroke Family Assessment   Stroke Family Assessment Needed No     Met with patient and his spouse at bedside.  Patient resides with his spouse in a multi level house.  Patient  resides on the first floor only. Patient does not require the use of any assistive devices.  Patient does not require oxygen.  Uses cpap at HS. Patient is independent with all ADL's.  Patient is able to cook, clean, shop and drive. Denies smoking and alcohol use. Denies falls except for the fall just prior to admission.  Patient  still works. Patient does not have a POA at this time but states he is working on it.     No home going needs anticipated. Waiting for ortho to see.

## 2025-04-11 NOTE — PROGRESS NOTES
Spiritual Care Visit  Spiritual Care Request    Reason for Visit:  Routine Visit: Introduction     Request Received From:       Focus of Care:  Visited With: Patient, Family         Refer to :          Spiritual Care Assessment    Spiritual Assessment:                      Care Provided:  Intended Effects: Establish rapport and connectedness, Build relationship of care and support, Demonstrate caring and concern  Methods: Offer emotional support  Interventions: [place]    Sense of Community and or Episcopalian Affiliation:  [place]         Addressed Needs/Concerns and/or Kuldeep Through:  Episcopalian Encounters  Episcopalian Needs: Prayer       Outcome:  Outcome of Spiritual Care Visit: Identifying spiritual/emotional issues, Comfort/healing presence     Advance Directives:         Spiritual Care Annotation      Annotation: Patient received a visit from the Spiritual care volunteer while admitted.  This patient was offered emotional and spiritual support no other needs were expressed. Spiritual care will remain available for support as requested.     Volunter Initial : Inocente FLOOD    Reviewed and Submitted by Chaplain Justice

## 2025-04-11 NOTE — PROGRESS NOTES
04/11/25 1656   [facility] Disability Status   Are you deaf or do you have serious difficulty hearing? N   Are you blind or do you have serious difficulty seeing, even when wearing glasses? N   Because of a physical, mental, or emotional condition, do you have serious difficulty concentrating, remembering, or making decisions? (5 years old or older) N   Do you have serious difficulty walking or climbing stairs? N   Do you have serious difficulty dressing or bathing? N   Because of a physical, mental, or emotional condition, do you have serious difficulty doing errands alone such as visiting the doctor? N

## 2025-04-11 NOTE — PROGRESS NOTES
"Colten Eason is a 64 y.o. male on day 2 of admission presenting with Fall, initial encounter.    Subjective   Patient seen and examined.  He is postoperative day 1 from a right lower extremity hematoma evacuation.  Of note, no active bleeding identified intraoperatively.  He is still having significant pain in the right medial thigh/knee area.       Objective     Physical Exam    General: Pt is alert and oriented x 3. Pleasant, conversive  HEENT: Head is atraumatic, normocephalic.   Cardiac: Normal S1-S2.  Regular rate and rhythm.  No murmurs.  Respiratory: Lungs clear to auscultation.  No adventitious sounds.  Abdomen: Soft, nondistended, nontender.  Bowel sounds x4 quadrants.  Protuberant, morbidly obese  Pulse exam: Palpable brachial and radial pulses bilaterally.   Femoral, popliteal, pedal pulses are palpable bilaterally.  Extremities: 2+ pitting edema right lower extremity.  The right medial thigh/knee hematoma has been decompressed quite significantly.  The fluid-filled bulla to the medial aspect of the thigh has ruptured.  There is Xeroform covering the area.  He has a KATTY drain in place which has bloody drainage coming from it.  Surgical incision near the knee is well-approximated with sutures in place.  There is some erythema janine-incisional he.  Neuro: Moves all extremities spontaneously.  No focal deficits.  Psych: Appropriate affect.  Answers questions appropriately        Last Recorded Vitals  Blood pressure 123/56, pulse 84, temperature 36.7 °C (98.1 °F), temperature source Temporal, resp. rate 18, height 1.727 m (5' 8\"), weight 134 kg (295 lb 6.7 oz), SpO2 93%.  Intake/Output last 3 Shifts:  I/O last 3 completed shifts:  In: 2190 (16.3 mL/kg) [P.O.:840; IV Piggyback:1350]  Out: 975 (7.3 mL/kg) [Urine:900 (0.2 mL/kg/hr); Drains:70; Blood:5]  Weight: 134 kg     Relevant Results  Scheduled medications  ceFAZolin, 2 g, intravenous, q12h  polyethylene glycol, 17 g, oral, Daily      Continuous " medications  lactated Ringer's, 100 mL/hr, Last Rate: 100 mL/hr (04/11/25 0253)      PRN medications  PRN medications: acetaminophen **OR** acetaminophen **OR** acetaminophen, HYDROmorphone, ondansetron ODT **OR** ondansetron, oxyCODONE, oxygen         Assessment/Plan   Right medial thigh/knee hematoma  Blood loss anemia  THONY on top of CKD    Patient is postoperative day 1 from hematoma evacuation of the right medial thigh.  Overall, he is doing quite well in the area is significantly decompressed.  Please see picture.  There was no active bleeding identified intraoperatively.    Recommendations:  - Give 2 more doses of cefazolin postoperatively (renally adjusted Q12 instead of Q8).  Patient has some mild surrounding tissue erythema at the incision site  -Keep leg compressed from the toes to mid thigh.    - Elevate leg is much as possible  -Dressing to be changed by vascular surgery daily.  -Consult orthopedics to ensure no knee joint involvement.  -Pain control  -Will DC KATTY drain when output has decreased       I spent 35 minutes in the professional and overall care of this patient.      Andrey Egan, APRN-CNP

## 2025-04-11 NOTE — ED PROVIDER NOTES
HPI   Chief Complaint   Patient presents with    Fall       HPI  This is a 64-year-old male presenting to the emergency department for mechanical fall.  Patient was at work when he was running and sustained a fall.  He landed directly on his right knee.  Patient states that he did hit his head as well.  He does not take any blood thinners.  He takes a aspirin 81 mg daily.  No loss of consciousness.  He was able to get up on his own.  He is having significant pain in his right knee and small amount of pain in the right upper part of his forehead from where he fell.    Please see HPI for pertinent positive and negative ROS.       Patient History   No past medical history on file.  Past Surgical History:   Procedure Laterality Date    CT ANGIO CORONARY ART WITH HEARTFLOW IF SCORE >30%  7/27/2020    CT HEART CORONARY ANGIOGRAM 7/27/2020 [facility]     No family history on file.  Social History     Tobacco Use    Smoking status: Never    Smokeless tobacco: Never   Substance Use Topics    Alcohol use: Not on file    Drug use: Not on file       Physical Exam   ED Triage Vitals   Temp Heart Rate Resp BP   04/09/25 1117 04/09/25 1117 04/09/25 1117 04/09/25 1117   36.5 °C (97.7 °F) 73 20 (!) 186/103      SpO2 Temp Source Heart Rate Source Patient Position   04/09/25 1117 04/09/25 1117 04/09/25 1658 04/09/25 1117   95 % Oral Monitor Lying      BP Location FiO2 (%)     04/09/25 1658 --     Left arm        Physical Exam  GENERAL APPEARANCE: Awake and alert. No acute respiratory distress.   VITAL SIGNS: As per the nurses' triage record.  HEENT: Normocephalic, atraumatic. Extraocular muscles are intact. Conjunctiva are pink. Negative scleral icterus. Mucous membranes are moist. Tongue in the midline. Oropharynx clear, uvula midline.  Orbital bones grossly intact without ecchymosis or tenderness to palpation bilaterally.  No nasal gross abnormalities. No midface instability.  No facial abrasions.  Small amount of redness and  swelling over the right frontal scalp.  No underlying bony deformities.  NECK: Soft, nontender and supple, full gross ROM, no meningeal signs.  No tenderness palpation of the cervical midline, no bony step-offs appreciated.  CHEST: Nontender to palpation. Clear to auscultation bilaterally. No rales, rhonchi, or wheezing. Symmetric rise and fall of chest wall.   HEART: Clear S1 and S2. Regular rate and rhythm. Strong and equal pulses in the extremities.  ABDOMEN: Soft, nontender, nondistended  MUSCULOSKELETAL: Patient has tenderness and bruising over the medial aspect of the right knee.  Limited active range of motion of the right knee secondary to this pain.  2+ pedal pulses bilaterally.  Sensations intact of lower extremities bilaterally.  NEUROLOGICAL: Awake, alert and oriented x 3. Motor power intact in the upper and lower extremities. Sensation is intact to light touch in the upper and lower extremities. Patient answering questions appropriately.   IMMUNOLOGICAL: No lymphatic streaking noted  DERMATOLOGIC: Warm and dry, ecchymosis and swelling of the medial aspect of the right knee.  Superficial overlying abrasions of the right knee.  PYSCH: Cooperative with appropriate mood and affect.    ED Course & MDM   ED Course as of 04/10/25 2224   Wed Apr 09, 2025   1617 CT knee right wo IV contrast [SH]   1710 Due to expanding soft tissue hematoma, CTA of the right lower extremity was ordered.  I did speak with Dr. Randle who does recommend admission for observation. [SH]   1713 Expanding hematoma based on this obtain ANA PAULA.  Will obtain CT angio. [TL]   1723 Approximate 2 point hemoglobin dropped from prior.  Repeat H&H ordered for 4 hours from initial. [TL]   1751 Vascular surgery resident did evaluate the patient and staffed with Dr. Stringer.  Recommending continued care with the orthopedic service and agrees with admission for observation of the lower extremity as well as angio. [TL]   1801 No signs of severe fluid  overload.  Potassium and bicarb are within normal limits.  No indication for emergent dialysis.  Incision sites do appear to be clean dry and intact. [TL]   1902 Normal BP of RLE. No diminished BP. Normal ANA PAULA to RLE. [TL]   1937 Case discussed with general surgery and vascular surgery at this time based on CTA results. [TL]      ED Course User Index  [SH] Shahida Bergeron PA-C  [TL] Ricardo Cook,          Diagnoses as of 04/10/25 2224   Fall, initial encounter   Hematoma of right lower extremity, initial encounter                 No data recorded     [place] Coma Scale Score: 15 (04/10/25 1950 : [place] [facility], INGE)                           Medical Decision Making  Parts of this chart have been completed using voice recognition software. Please excuse any errors of transcription.  My thought process and reason for plan has been formulated from the time that I saw the patient until the time of disposition and is not specific to one specific moment during their visit and furthermore my MDM encompasses this entire chart and not only this text box.      HPI: Detailed above.    Exam: A medically appropriate exam performed, outlined above, given the known history and presentation.    History obtained from: Patient  Medications given during visit:  Medications   oxyCODONE (Roxicodone) immediate release tablet 5 mg (5 mg oral Given 4/10/25 1723)   ondansetron ODT (Zofran-ODT) disintegrating tablet 4 mg (has no administration in time range)     Or   ondansetron (Zofran) injection 4 mg (has no administration in time range)   acetaminophen (Tylenol) tablet 650 mg (has no administration in time range)     Or   acetaminophen (Tylenol) oral liquid 650 mg (has no administration in time range)     Or   acetaminophen (Tylenol) suppository 650 mg (has no administration in time range)   polyethylene glycol (Glycolax, Miralax) packet 17 g (17 g oral Not Given 4/10/25 0948)   oxygen (O2) therapy (has no administration in time range)    lactated Ringer's infusion (100 mL/hr intravenous New Bag 4/10/25 1722)   albuterol 2.5 mg /3 mL (0.083 %) nebulizer solution 2.5 mg (has no administration in time range)   hydrALAZINE (Apresoline) injection 5 mg (has no administration in time range)   HYDROmorphone (Dilaudid) injection 0.2 mg (0.2 mg intravenous Given 4/10/25 1941)   morphine injection 4 mg (4 mg intravenous Given 4/9/25 1253)   ondansetron (Zofran) injection 4 mg (4 mg intravenous Given 4/9/25 1254)   morphine injection 4 mg (4 mg intravenous Given 4/9/25 1705)   diphth,pertus(acell),tetanus (BoostRIX) 2.5-8-5 Lf-mcg-Lf/0.5mL vaccine 0.5 mL (0.5 mL intramuscular Given 4/9/25 1813)   lactated Ringer's bolus 250 mL (0 mL intravenous Stopped 4/9/25 1932)   iohexol (OMNIPaque) 350 mg iodine/mL solution 120 mL (120 mL intravenous Given 4/9/25 1809)   morphine injection 2 mg (2 mg intravenous Given 4/9/25 2057)        Diagnostic/tests  Labs Reviewed   CBC WITH AUTO DIFFERENTIAL - Abnormal       Result Value    WBC 8.9      nRBC 0.0      RBC 3.81 (*)     Hemoglobin 11.6 (*)     Hematocrit 35.5 (*)     MCV 93      MCH 30.4      MCHC 32.7      RDW 14.0      Platelets 204      Neutrophils % 72.1      Immature Granulocytes %, Automated 1.7 (*)     Lymphocytes % 16.3      Monocytes % 8.5      Eosinophils % 0.8      Basophils % 0.6      Neutrophils Absolute 6.44      Immature Granulocytes Absolute, Automated 0.15      Lymphocytes Absolute 1.45      Monocytes Absolute 0.76      Eosinophils Absolute 0.07      Basophils Absolute 0.05     COMPREHENSIVE METABOLIC PANEL - Abnormal    Glucose 102 (*)     Sodium 141      Potassium 4.3      Chloride 111 (*)     Bicarbonate 24      Anion Gap 10      Urea Nitrogen 55 (*)     Creatinine 2.00 (*)     eGFR 37 (*)     Calcium 8.2 (*)     Albumin 3.2 (*)     Alkaline Phosphatase 47      Total Protein 5.5 (*)     AST 13      Bilirubin, Total 0.3      ALT 18     URINALYSIS WITH REFLEX CULTURE AND MICROSCOPIC - Abnormal     Color, Urine Colorless (*)     Appearance, Urine Clear      Specific Gravity, Urine 1.017      pH, Urine 6.0      Protein, Urine 300 (3+) (*)     Glucose, Urine 30 (TRACE) (*)     Blood, Urine 0.06 (1+) (*)     Ketones, Urine NEGATIVE      Bilirubin, Urine NEGATIVE      Urobilinogen, Urine Normal      Nitrite, Urine NEGATIVE      Leukocyte Esterase, Urine NEGATIVE     HEMOGLOBIN AND HEMATOCRIT, BLOOD - Abnormal    Hemoglobin 10.4 (*)     Hematocrit 32.2 (*)    HEMOGLOBIN AND HEMATOCRIT, BLOOD - Abnormal    Hemoglobin 10.2 (*)     Hematocrit 32.0 (*)    HEMOGLOBIN AND HEMATOCRIT, BLOOD - Abnormal    Hemoglobin 10.5 (*)     Hematocrit 31.8 (*)    PROTIME-INR - Normal    Protime 11.2      INR 1.0      Narrative:     INR Therapeutic Range: 2.0-3.5   APTT - Normal    aPTT 24.1     URINALYSIS WITH REFLEX CULTURE AND MICROSCOPIC    Narrative:     The following orders were created for panel order Urinalysis with Reflex Culture and Microscopic.  Procedure                               Abnormality         Status                     ---------                               -----------         ------                     Urinalysis with Reflex C...[[id number]]  Abnormal            Final result               Extra Urine Gray Tube[[id number]]                            Final result                 Please view results for these tests on the individual orders.   EXTRA URINE GRAY TUBE    Extra Tube Hold for add-ons.     VERAB/VERIFY ABORH    ABO TYPE O      Rh TYPE POS     TYPE AND SCREEN   PREPARE RBC   URINALYSIS MICROSCOPIC WITH REFLEX CULTURE    WBC, Urine 1-5      RBC, Urine 1-2      Mucus, Urine FEW        CT angio lower extremity right w and or wo IV contrast   Final Result   1.  Contrast blushing is in the two hematomas located in the medial   aspect of the distal right thigh and anteromedial aspect of the right   knee, suggestive of an arterial bleed.  It is indeterminate whether or   not these are contributed by branches of  the profunda femoral artery,   or the geniculate arteries.   2.  No stenoses or dissections within the visualized portions of the   right superficial femoral artery, or right popliteal artery.   This report was called to Dr. Ricardo Cook at 7:40 PM on April 9, 2025.   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        Signed by: Ludwig Briggs 4/9/2025 5:28 PM   Dictation workstation:   [id number]      CT knee right wo IV contrast   Final Result   1.  Large hematoma over the anteromedial aspect of the right knee.   2.  Additional hematoma over the medial aspect of the distal right   thigh.   3.  No acute osseous findings involving the right knee.   4.  No prominent hematoma appreciated within the surrounding   musculature.     5.  Subcutaneous edema overlying the right knee.   Signed by Nik Hart MD      XR knee right 4+ views   Final Result   No definite acute osseous finding. Mild degenerative changes.   Prominent anterior soft tissue swelling with possible nodularity.        MACRO:   None.        Signed by: Ca Viera 4/9/2025 12:56 PM   Dictation workstation:   [id number]      XR femur right 2+ views   Final Result   No acute bone or joint abnormality. Soft tissue swelling. DJD.   Signed by Deniz Benson MD      CT head wo IV contrast   Final Result   No evidence of acute cortical infarct or intracranial hemorrhage.        2.4 x 2.0 x 2.8 cm rounded cystic lesion containing peripheral   calcification in the region peripheral to the fossa of Rosenmuller.   Follow-up nonemergent MRI soft tissue neck protocol advised for   further assessment.        MACRO:   None             Signed by: Adal Landeros 4/9/2025 12:53 PM   Dictation workstation:   [id number]           Considerations/further MDM:  Patient was seen in conjucntion with my supervising physician,  Dr. Cook. Please refer to his note.    Patient presents for  mechanical fall.  Imaging ordered based off of history and physical exam findings.  CT head without IV contrast shows no acute fracture or cortical infarct or intracranial hemorrhage.  There is a rounded cystic lesion containing peripheral calcification in the region peripheral to the fossa of the Rosenmuller, recommend nonemergent MRI of the soft tissue neck.  Nexus criteria for CT cervical spine negative.  X-ray of the right knee and the right femur showed no acute fractures.  Proceeded with CT right knee without IV contrast to further evaluate for forming hematoma which was a large hematoma over the anterior medial aspect of the right knee.  Reexamined at the patient shows significant increase in hematoma size.  Please see pictures in media for what hematoma looks like at this point.  CTA of the right lower extremity was ordered to evaluate for vessel dissection.  Labs were ordered as well.  At the end of my shift, the patient does remain in the emergency department pending CTA results and lab results.  Please read my supervising physician's note for further management and disposition of this patient while he remains in the emergency department.  End of my shift for 4/9/2025 at 1715.     Procedure  Procedures     Shahida Bergeron PA-C  04/10/25 9121

## 2025-04-11 NOTE — PROGRESS NOTES
"ACUTE CARE SURGERY PROGRESS NOTE  4/11/2025   [id number]     HPI:  HPI: P)nick fell while running. He lost his balance and fell onto concrete. No blood thinners. Takes baby aspirin daily. He hit his right knee and his head. No LOC. Initially, he was able to walk but then the knee started to swell     OVERNIGHT EVENTS: Went to the OR with vascular yesterday    ASSESSMENT & PLAN:  /81 (BP Location: Left arm, Patient Position: Lying)   Pulse 69   Temp 36.3 °C (97.3 °F) (Temporal)   Resp 18   Ht 1.727 m (5' 8\")   Wt 134 kg (295 lb 6.7 oz)   SpO2 96%   BMI 44.92 kg/m²   Problem/Injury Exam and Plan Resolved   R knee hematoma and skin blistering  Patient went to OR yesterday and had a hematoma washout. No active bleeding  No numbness or tingling  Dressing cdi, no strikethrough  ACE wrap in place  Leg less swelling than yesterday  Continue as needed pain management  Will need physical therapy  Monitor hemoglobin                                                      Heart Rate:  [69-88]   Temp:  [36.2 °C (97.2 °F)-36.8 °C (98.2 °F)]   Resp:  [15-18]   BP: (120-165)/(76-91)   Weight:  [134 kg (295 lb 6.7 oz)]   SpO2:  [90 %-96 %]        Blood pressure 147/81, pulse 69, temperature 36.3 °C (97.3 °F), temperature source Temporal, resp. rate 18, height 1.727 m (5' 8\"), weight 134 kg (295 lb 6.7 oz), SpO2 96%.     Physical Exam  Constitutional:       General: He is not in acute distress.     Appearance: He is obese. He is not ill-appearing or toxic-appearing.   HENT:      Head: Normocephalic and atraumatic.   Cardiovascular:      Comments: Triphasic doppler signal, bilat PT  Pulmonary:      Effort: Respiratory distress present.   Abdominal:      General: There is no distension.   Musculoskeletal:      Right lower leg: Edema present.   Skin:     General: Skin is warm and dry.      Comments: Cap refill wnl bilat LE   Neurological:      Mental Status: He is alert.   Psychiatric:         Mood and Affect: Mood normal.    " "     Behavior: Behavior normal.         Thought Content: Thought content normal.            Intake/Output Summary (Last 24 hours) at 4/11/2025 0953  Last data filed at 4/11/2025 0500  Gross per 24 hour   Intake 1100 ml   Output 675 ml   Net 425 ml       Labs:   Results from last 72 hours   Lab Units 04/11/25  0518 04/09/25  1657   SODIUM mmol/L 140 141   POTASSIUM mmol/L 4.6 4.3   CHLORIDE mmol/L 110* 111*   CO2 mmol/L 24 24   BUN mg/dL 44* 55*   CREATININE mg/dL 2.26* 2.00*   GLUCOSE mg/dL 135* 102*   CALCIUM mg/dL 7.7* 8.2*   ANION GAP mmol/L 11 10   EGFR mL/min/1.73m*2 32* 37*   PHOSPHORUS mg/dL 3.6  --       Results from last 72 hours   Lab Units 04/11/25  0518 04/10/25  1750 04/10/25  1146 04/10/25  0505 04/09/25  1657   WBC AUTO x10*3/uL 9.9  --   --   --  8.9   HEMOGLOBIN g/dL 9.7* 10.5* 10.2*   < > 11.6*   HEMATOCRIT % 30.0* 31.8* 32.0*   < > 35.5*   PLATELETS AUTO x10*3/uL 182  --   --   --  204   NEUTROS PCT AUTO %  --   --   --   --  72.1   LYMPHS PCT AUTO %  --   --   --   --  16.3   MONOS PCT AUTO %  --   --   --   --  8.5   EOS PCT AUTO %  --   --   --   --  0.8    < > = values in this interval not displayed.                 Micro/ID:     No results found for: \"URINECULTURE\", \"BLOODCULT\", \"CSFCULTSMEAR\"           Nydia Kruger MD   4/11/2025, 9:53 AM   "

## 2025-04-12 LAB
ANION GAP SERPL CALCULATED.3IONS-SCNC: 11 MMOL/L (ref 10–20)
BUN SERPL-MCNC: 48 MG/DL (ref 6–23)
CALCIUM SERPL-MCNC: 7.7 MG/DL (ref 8.6–10.3)
CHLORIDE SERPL-SCNC: 110 MMOL/L (ref 98–107)
CO2 SERPL-SCNC: 26 MMOL/L (ref 21–32)
CREAT SERPL-MCNC: 2.35 MG/DL (ref 0.5–1.3)
EGFRCR SERPLBLD CKD-EPI 2021: 30 ML/MIN/1.73M*2
ERYTHROCYTE [DISTWIDTH] IN BLOOD BY AUTOMATED COUNT: 14.2 % (ref 11.5–14.5)
GLUCOSE SERPL-MCNC: 104 MG/DL (ref 74–99)
HCT VFR BLD AUTO: 27.2 % (ref 41–52)
HGB BLD-MCNC: 8.9 G/DL (ref 13.5–17.5)
MAGNESIUM SERPL-MCNC: 2.03 MG/DL (ref 1.6–2.4)
MCH RBC QN AUTO: 30.2 PG (ref 26–34)
MCHC RBC AUTO-ENTMCNC: 32.7 G/DL (ref 32–36)
MCV RBC AUTO: 92 FL (ref 80–100)
NRBC BLD-RTO: 0 /100 WBCS (ref 0–0)
PHOSPHATE SERPL-MCNC: 3.2 MG/DL (ref 2.5–4.9)
PLATELET # BLD AUTO: 158 X10*3/UL (ref 150–450)
POTASSIUM SERPL-SCNC: 4.1 MMOL/L (ref 3.5–5.3)
RBC # BLD AUTO: 2.95 X10*6/UL (ref 4.5–5.9)
SODIUM SERPL-SCNC: 143 MMOL/L (ref 136–145)
WBC # BLD AUTO: 7.7 X10*3/UL (ref 4.4–11.3)

## 2025-04-12 PROCEDURE — 82374 ASSAY BLOOD CARBON DIOXIDE: CPT | Performed by: PHYSICIAN ASSISTANT

## 2025-04-12 PROCEDURE — 99024 POSTOP FOLLOW-UP VISIT: CPT | Performed by: NURSE PRACTITIONER

## 2025-04-12 PROCEDURE — 99222 1ST HOSP IP/OBS MODERATE 55: CPT | Performed by: STUDENT IN AN ORGANIZED HEALTH CARE EDUCATION/TRAINING PROGRAM

## 2025-04-12 PROCEDURE — 84100 ASSAY OF PHOSPHORUS: CPT | Performed by: PHYSICIAN ASSISTANT

## 2025-04-12 PROCEDURE — 2060000001 HC INTERMEDIATE ICU ROOM DAILY

## 2025-04-12 PROCEDURE — 36415 COLL VENOUS BLD VENIPUNCTURE: CPT | Performed by: PHYSICIAN ASSISTANT

## 2025-04-12 PROCEDURE — 2500000004 HC RX 250 GENERAL PHARMACY W/ HCPCS (ALT 636 FOR OP/ED): Performed by: NURSE PRACTITIONER

## 2025-04-12 PROCEDURE — 83735 ASSAY OF MAGNESIUM: CPT | Performed by: PHYSICIAN ASSISTANT

## 2025-04-12 PROCEDURE — 2500000001 HC RX 250 WO HCPCS SELF ADMINISTERED DRUGS (ALT 637 FOR MEDICARE OP): Performed by: SURGERY

## 2025-04-12 PROCEDURE — 85027 COMPLETE CBC AUTOMATED: CPT | Performed by: PHYSICIAN ASSISTANT

## 2025-04-12 PROCEDURE — 80048 BASIC METABOLIC PNL TOTAL CA: CPT | Performed by: PHYSICIAN ASSISTANT

## 2025-04-12 RX ADMIN — CEFAZOLIN SODIUM 2 G: 2 SOLUTION INTRAVENOUS at 00:00

## 2025-04-12 RX ADMIN — OXYCODONE HYDROCHLORIDE 5 MG: 5 TABLET ORAL at 09:11

## 2025-04-12 RX ADMIN — OXYCODONE HYDROCHLORIDE 5 MG: 5 TABLET ORAL at 01:37

## 2025-04-12 RX ADMIN — CEFAZOLIN SODIUM 2 G: 2 SOLUTION INTRAVENOUS at 12:46

## 2025-04-12 RX ADMIN — OXYCODONE HYDROCHLORIDE 5 MG: 5 TABLET ORAL at 19:10

## 2025-04-12 ASSESSMENT — COGNITIVE AND FUNCTIONAL STATUS - GENERAL
WALKING IN HOSPITAL ROOM: A LITTLE
DRESSING REGULAR UPPER BODY CLOTHING: A LITTLE
MOBILITY SCORE: 22
HELP NEEDED FOR BATHING: A LITTLE
DAILY ACTIVITIY SCORE: 20
PERSONAL GROOMING: A LITTLE
HELP NEEDED FOR BATHING: A LITTLE
DRESSING REGULAR LOWER BODY CLOTHING: A LITTLE
CLIMB 3 TO 5 STEPS WITH RAILING: A LITTLE
PERSONAL GROOMING: A LITTLE
CLIMB 3 TO 5 STEPS WITH RAILING: A LITTLE
DAILY ACTIVITIY SCORE: 20
MOBILITY SCORE: 22
DRESSING REGULAR UPPER BODY CLOTHING: A LITTLE
DRESSING REGULAR LOWER BODY CLOTHING: A LITTLE
WALKING IN HOSPITAL ROOM: A LITTLE

## 2025-04-12 ASSESSMENT — PAIN DESCRIPTION - DESCRIPTORS
DESCRIPTORS: ACHING
DESCRIPTORS: ACHING

## 2025-04-12 ASSESSMENT — PAIN - FUNCTIONAL ASSESSMENT
PAIN_FUNCTIONAL_ASSESSMENT: 0-10

## 2025-04-12 ASSESSMENT — PAIN SCALES - GENERAL
PAINLEVEL_OUTOF10: 2
PAINLEVEL_OUTOF10: 5 - MODERATE PAIN
PAINLEVEL_OUTOF10: 1
PAINLEVEL_OUTOF10: 6
PAINLEVEL_OUTOF10: 2
PAINLEVEL_OUTOF10: 6

## 2025-04-12 ASSESSMENT — PAIN DESCRIPTION - ORIENTATION: ORIENTATION: RIGHT

## 2025-04-12 ASSESSMENT — PAIN DESCRIPTION - LOCATION: LOCATION: LEG

## 2025-04-12 NOTE — CARE PLAN
The patient's goals for the shift include Patient Safety    The clinical goals for the shift include Patient will remain HDS this shift      Problem: Pain - Adult  Goal: Verbalizes/displays adequate comfort level or baseline comfort level  Outcome: Progressing     Problem: Safety - Adult  Goal: Free from fall injury  Outcome: Progressing     Problem: Discharge Planning  Goal: Discharge to home or other facility with appropriate resources  Outcome: Progressing     Problem: Chronic Conditions and Co-morbidities  Goal: Patient's chronic conditions and co-morbidity symptoms are monitored and maintained or improved  Outcome: Progressing     Problem: Nutrition  Goal: Nutrient intake appropriate for maintaining nutritional needs  Outcome: Progressing     Problem: Skin  Goal: Decreased wound size/increased tissue granulation at next dressing change  Outcome: Progressing  Goal: Participates in plan/prevention/treatment measures  Outcome: Progressing  Goal: Prevent/manage excess moisture  Outcome: Progressing  Goal: Prevent/minimize sheer/friction injuries  Outcome: Progressing  Goal: Promote/optimize nutrition  Outcome: Progressing  Goal: Promote skin healing  Outcome: Progressing     Problem: Fall/Injury  Goal: Not fall by end of shift  Outcome: Progressing  Goal: Be free from injury by end of the shift  Outcome: Progressing  Goal: Verbalize understanding of personal risk factors for fall in the hospital  Outcome: Progressing  Goal: Verbalize understanding of risk factor reduction measures to prevent injury from fall in the home  Outcome: Progressing  Goal: Use assistive devices by end of the shift  Outcome: Progressing  Goal: Pace activities to prevent fatigue by end of the shift  Outcome: Progressing     Problem: Pain  Goal: Takes deep breaths with improved pain control throughout the shift  Outcome: Progressing  Goal: Turns in bed with improved pain control throughout the shift  Outcome: Progressing  Goal: Walks with  improved pain control throughout the shift  Outcome: Progressing  Goal: Performs ADL's with improved pain control throughout shift  Outcome: Progressing  Goal: Participates in PT with improved pain control throughout the shift  Outcome: Progressing  Goal: Free from opioid side effects throughout the shift  Outcome: Progressing  Goal: Free from acute confusion related to pain meds throughout the shift  Outcome: Progressing

## 2025-04-12 NOTE — PROGRESS NOTES
"Colten Eason is a 64 y.o. male on day 3 of admission presenting with Fall, initial encounter.    Subjective   Still with knee pain. Tolerating diet without issue.       Objective     Physical Exam  Vitals and nursing note reviewed.   Constitutional:       Appearance: Normal appearance.   HENT:      Head: Normocephalic and atraumatic.      Mouth/Throat:      Mouth: Mucous membranes are moist.      Pharynx: Oropharynx is clear.   Eyes:      Extraocular Movements: Extraocular movements intact.      Pupils: Pupils are equal, round, and reactive to light.   Cardiovascular:      Rate and Rhythm: Normal rate and regular rhythm.      Pulses: Normal pulses.   Pulmonary:      Effort: Pulmonary effort is normal.      Breath sounds: Normal breath sounds.      Comments: Wearing CPAP on my arrival  Abdominal:      General: There is no distension.      Palpations: Abdomen is soft.      Tenderness: There is no abdominal tenderness.   Musculoskeletal:      Cervical back: Normal range of motion and neck supple.      Comments: RLE in ACE wrap   Skin:     General: Skin is warm and dry.   Neurological:      General: No focal deficit present.      Mental Status: He is alert and oriented to person, place, and time.   Psychiatric:         Mood and Affect: Mood normal.         Behavior: Behavior normal.         Last Recorded Vitals  Blood pressure 120/62, pulse 77, temperature 36.7 °C (98 °F), temperature source Temporal, resp. rate 17, height 1.727 m (5' 8\"), weight 136 kg (299 lb 3.2 oz), SpO2 93%.  Intake/Output last 3 Shifts:  I/O last 3 completed shifts:  In: - (0 mL/kg)   Out: 2035 (15 mL/kg) [Urine:1920 (0.4 mL/kg/hr); Drains:115]  Weight: 135.7 kg     Relevant Results  No results found.  Results for orders placed or performed during the hospital encounter of 04/09/25 (from the past 24 hours)   CBC   Result Value Ref Range    WBC 7.7 4.4 - 11.3 x10*3/uL    nRBC 0.0 0.0 - 0.0 /100 WBCs    RBC 2.95 (L) 4.50 - 5.90 x10*6/uL    " Hemoglobin 8.9 (L) 13.5 - 17.5 g/dL    Hematocrit 27.2 (L) 41.0 - 52.0 %    MCV 92 80 - 100 fL    MCH 30.2 26.0 - 34.0 pg    MCHC 32.7 32.0 - 36.0 g/dL    RDW 14.2 11.5 - 14.5 %    Platelets 158 150 - 450 x10*3/uL   Basic metabolic panel   Result Value Ref Range    Glucose 104 (H) 74 - 99 mg/dL    Sodium 143 136 - 145 mmol/L    Potassium 4.1 3.5 - 5.3 mmol/L    Chloride 110 (H) 98 - 107 mmol/L    Bicarbonate 26 21 - 32 mmol/L    Anion Gap 11 10 - 20 mmol/L    Urea Nitrogen 48 (H) 6 - 23 mg/dL    Creatinine 2.35 (H) 0.50 - 1.30 mg/dL    eGFR 30 (L) >60 mL/min/1.73m*2    Calcium 7.7 (L) 8.6 - 10.3 mg/dL   Phosphorus   Result Value Ref Range    Phosphorus 3.2 2.5 - 4.9 mg/dL   Magnesium   Result Value Ref Range    Magnesium 2.03 1.60 - 2.40 mg/dL              Assessment/Plan   Assessment & Plan  Fall, initial encounter    Hematoma      S/p evacuation of RLQ hematoma by vascular surgery on 4/10    Daily wound care per vascular  Ortho consulted for persistent knee pain and MRI pending  Hb cont to slowly downtrend, daily labs  Baseline Cre 2, slightly up to 2.35 today, encourage PO, daily labs      Imani Lu MD

## 2025-04-12 NOTE — CONSULTS
"Reason For Consult  Right knee pain    History Of Present Illness  Colten Eason is a 64 y.o. male presenting with right-sided knee pain.  Patient had fallen earlier in the week and presented to the hospital with a large expanding hematoma which was evacuated by vascular surgery.  States that he still has a lot of weakness and pain in the leg.  Denies any numbness or tingling.  Denies any previous issues with the leg     Past Medical History  He has no past medical history on file.    Surgical History  He has a past surgical history that includes CT angio coronary art with heartflow if score >30% (7/27/2020).     Social History  He reports that he has never smoked. He has never used smokeless tobacco. No history on file for alcohol use and drug use.    Family History  No family history on file.     Allergies  Patient has no known allergies.    Review of Systems  Denies aside from right leg and knee pain     Physical Exam  Patient has dressings intact about the right leg there is some tenderness palpation particular about the extensor mechanism although it seems to be at least partially palpably intact patient is unable to perform a straight leg raise however sometimes palpation more directly anteriorly over the knee and patella lesser so over the medial lateral aspect of the leg the knee does appear to be stable to gentle varus valgus stressing anterior posterior drawer no calf pain tenderness or swelling positive EHL FHL and sensation tact light touch throughout     Last Recorded Vitals  Blood pressure (!) 148/96, pulse 73, temperature 36.2 °C (97.2 °F), temperature source Temporal, resp. rate 17, height 1.727 m (5' 8\"), weight 136 kg (299 lb 3.2 oz), SpO2 94%.    Relevant Results  X-rays and CT of the leg do not show any obvious fracture     Assessment/Plan     64-year-old male status post evacuation of a right leg knee hematoma.  The patient's knee pain would not be totally unexpected given his injury that was " sustained however given his ability to extend the knee with any significant force I would recommend we obtain an MRI to rule out any sort of extensor mechanism injury.  Will follow-up on those results and make further recommendations    I spent 30 minutes in the professional and overall care of this patient.      Munir Spencer MD

## 2025-04-12 NOTE — CARE PLAN
The patient's goals for the shift include Patient Safety    The clinical goals for the shift include pain management      Problem: Pain - Adult  Goal: Verbalizes/displays adequate comfort level or baseline comfort level  Outcome: Progressing     Problem: Safety - Adult  Goal: Free from fall injury  Outcome: Progressing     Problem: Discharge Planning  Goal: Discharge to home or other facility with appropriate resources  Outcome: Progressing     Problem: Chronic Conditions and Co-morbidities  Goal: Patient's chronic conditions and co-morbidity symptoms are monitored and maintained or improved  Outcome: Progressing     Problem: Nutrition  Goal: Nutrient intake appropriate for maintaining nutritional needs  Outcome: Progressing     Problem: Skin  Goal: Decreased wound size/increased tissue granulation at next dressing change  Outcome: Progressing  Goal: Participates in plan/prevention/treatment measures  Outcome: Progressing  Goal: Prevent/manage excess moisture  Outcome: Progressing  Goal: Prevent/minimize sheer/friction injuries  Outcome: Progressing  Flowsheets (Taken 4/12/2025 1022)  Prevent/minimize sheer/friction injuries:   Complete micro-shifts as needed if patient unable. Adjust patient position to relieve pressure points, not a full turn   Use pull sheet   HOB 30 degrees or less   Turn/reposition every 2 hours/use positioning/transfer devices  Goal: Promote/optimize nutrition  Outcome: Progressing  Goal: Promote skin healing  Outcome: Progressing     Problem: Fall/Injury  Goal: Not fall by end of shift  Outcome: Progressing  Goal: Be free from injury by end of the shift  Outcome: Progressing  Goal: Verbalize understanding of personal risk factors for fall in the hospital  Outcome: Progressing  Goal: Verbalize understanding of risk factor reduction measures to prevent injury from fall in the home  Outcome: Progressing  Goal: Use assistive devices by end of the shift  Outcome: Progressing  Goal: Pace activities  to prevent fatigue by end of the shift  Outcome: Progressing     Problem: Pain  Goal: Takes deep breaths with improved pain control throughout the shift  Outcome: Progressing  Goal: Turns in bed with improved pain control throughout the shift  Outcome: Progressing  Goal: Walks with improved pain control throughout the shift  Outcome: Progressing  Goal: Performs ADL's with improved pain control throughout shift  Outcome: Progressing  Goal: Participates in PT with improved pain control throughout the shift  Outcome: Progressing  Goal: Free from opioid side effects throughout the shift  Outcome: Progressing  Goal: Free from acute confusion related to pain meds throughout the shift  Outcome: Progressing

## 2025-04-12 NOTE — PROGRESS NOTES
"Colten Eason is a 64 y.o. male on day 3 of admission presenting with Fall, initial encounter.    Subjective   Patient seen and examined.  Still complaining of pain to the anterior aspect of the knee.  Patient is postoperative day 2 from hematoma evacuation from right to medial knee/thigh.       Objective     Physical Exam    Expand All Collapse All    Colten Eason is a 64 y.o. male on day 2 of admission presenting with Fall, initial encounter.        Subjective  Patient seen and examined.  He is postoperative day 1 from a right lower extremity hematoma evacuation.  Of note, no active bleeding identified intraoperatively.  He is still having significant pain in the right medial thigh/knee area.              Objective  Physical Exam     General: Pt is alert and oriented x 3. Pleasant, conversive  HEENT: Head is atraumatic, normocephalic.   Cardiac: Normal S1-S2.  Regular rate and rhythm.  No murmurs.  Respiratory: Lungs clear to auscultation.  No adventitious sounds.  Abdomen: Soft, nondistended, nontender.  Bowel sounds x4 quadrants.  Protuberant, morbidly obese  Pulse exam: Palpable brachial and radial pulses bilaterally.   Femoral, popliteal, pedal pulses are palpable bilaterally.  Extremities: 2+ pitting edema right lower extremity.  The right medial thigh/knee hematoma has been decompressed quite significantly.  The fluid-filled bulla to the medial aspect of the thigh has ruptured.  There is Xeroform covering the area.  He has a KATTY drain in place which has bloody drainage coming from it.  Surgical incision near the knee is well-approximated with sutures in place.  There is some erythema janine-incisional he.  Neuro: Moves all extremities spontaneously.  No focal deficits.  Psych: Appropriate affect.  Answers questions appropriately     Last Recorded Vitals  Blood pressure 120/62, pulse 77, temperature 36.7 °C (98 °F), temperature source Temporal, resp. rate 17, height 1.727 m (5' 8\"), weight 136 kg (299 lb 3.2 " oz), SpO2 93%.  Intake/Output last 3 Shifts:  I/O last 3 completed shifts:  In: - (0 mL/kg)   Out: 2035 (15 mL/kg) [Urine:1920 (0.4 mL/kg/hr); Drains:115]  Weight: 135.7 kg     Relevant Results  CT angio lower extremity right w and or wo IV contrast    Result Date: 4/9/2025  STUDY: CT CTA LOWER EXTREMITY RIGHT; 04/09/2025 06:01 PM INDICATION:  Right lower extremity hematoma, evaluate for vessel dissection. COMPARISON: CT Knee 04/09/2025; XR Femur 04/09/2025. ACCESSION NUMBER(S): [id number] ORDERING CLINICIAN: TECHNIQUE:  Helical CT is performed from the infrarenal aorta through the feet after bolus administration of 120 mL of Omnipaque 350. Images are reviewed and processed at a workstation according to the CT angiogram protocol with 3-D and/or MIP post processing imaging generated.  2236 DICOM images received. Automated mA/kV exposure control was utilized and patient examination was performed in strict accordance with principles of ALARA. FINDINGS: No stenoses are seen within the visualized portions of the right superficial femoral artery, or within the right popliteal artery.  No intimal flap is visualized to indicate a dissection.  There are small contrast blush is seen in the hematomas in the medial aspect of the distal right thigh, and anterior medial aspect of the right knee.  It is indeterminate whether or not there are branches from the profunda femoral artery or the geniculate branches from the popliteal artery are contributing to these hematomas.    1.  Contrast blushing is in the two hematomas located in the medial aspect of the distal right thigh and anteromedial aspect of the right knee, suggestive of an arterial bleed.  It is indeterminate whether or not these are contributed by branches of the profunda femoral artery, or the geniculate arteries. 2.  No stenoses or dissections within the visualized portions of the right superficial femoral artery, or right popliteal artery. This report was called to  Dr. Ricardo Cook at 7:40 PM on April 9, 2025. Signed by Nik Hart MD    XR hand right 3+ views    Result Date: 4/9/2025  Interpreted By:  Ludwig Briggs, STUDY: XR HAND RIGHT 3+ VIEWS   INDICATION: Signs/Symptoms:right hand pain.   COMPARISON: None   ACCESSION NUMBER(S): [id number]   ORDERING CLINICIAN: QIAN DELAROSA   FINDINGS: Mild osteoarthritis right hand more advanced at the 1st [facility]. No evidence of fracture or malalignment       Osteoarthritis right hand most prominent at the 1st [facility].   Signed by: Ludwig Briggs 4/9/2025 5:28 PM Dictation workstation:   [id number]    CT knee right wo IV contrast    Result Date: 4/9/2025  STUDY: CT Extremity; Completed Time:  4/9/2025 3:12 PM INDICATION: Evaluate for soft tissue hematoma. COMPARISON: XR RT femur 4/9/2025. ACCESSION NUMBER(S): [id number] ORDERING CLINICIAN: QIAN DELAROSA TECHNIQUE:  Thin section axial images were obtained through the right knee without intravenous contrast.  Orthogonal reconstructed images were obtained and reviewed.  Automated mA/kV exposure control was utilized and patient examination was performed in strict accordance with principles of ALARA. FINDINGS: No acute fractures or dislocations are visualized within the right knee.  There is no evidence of patellar subluxation.  No suprapatellar effusion is appreciated.  There is prominent soft tissue swelling over the anterior aspect of the knee.  There is a hyperdense fluid collection, indicative of a hematoma.  It is estimated at 14.5 x 9.9 x 5.2 cm.  There is impingement noted upon the vastus medialis of the quadriceps muscle.  However, this does not appear to involve the musculature.  There is a second hematoma over the medial aspect the distal thigh, measured at 11.7 x 8.6 x 2.7 cm.  There is generalized subcutaneous edema over the knee.    1.  Large hematoma over the anteromedial aspect of the right knee. 2.  Additional hematoma over the medial aspect of the distal right thigh. 3.  No acute  osseous findings involving the right knee. 4.  No prominent hematoma appreciated within the surrounding musculature.  5.  Subcutaneous edema overlying the right knee. Signed by Nik Hart MD    XR femur right 2+ views    Result Date: 4/9/2025  STUDY: Femur Radiographs; 4/9/2025 12:50 PM INDICATION: Right thigh pain. COMPARISON: None Available. ACCESSION NUMBER(S): [id number] ORDERING CLINICIAN: QIAN DELAROSA TECHNIQUE:  Two view(s) of the right femur (five images). FINDINGS:  There is no displaced fracture.  The alignment is anatomic.  Narrowing of the right hip joint space.  Prominent soft tissue swelling of the distal thigh and knee.    No acute bone or joint abnormality. Soft tissue swelling. DJD. Signed by Deniz Benson MD    XR knee right 4+ views    Result Date: 4/9/2025  Interpreted By:  Ca Viera, STUDY: XR KNEE RIGHT 4+ VIEWS;  4/9/2025 12:45 pm   INDICATION: Signs/Symptoms:right knee pain.     COMPARISON: None.   ACCESSION NUMBER(S): [id number]   ORDERING CLINICIAN: QIAN DELAROSA   FINDINGS: Five views of the right knee obtained.   No acute fracture or osseous displacement. Mild medial compartment narrowing. Minimal lateral and patellofemoral compartment spurring. Prominent anterior/prepatellar soft tissue swelling with possible subtle nodularity. Faint linear presumed vascular calcifications in the posterior soft tissues       No definite acute osseous finding. Mild degenerative changes. Prominent anterior soft tissue swelling with possible nodularity.   MACRO: None.   Signed by: Ca Viera 4/9/2025 12:56 PM Dictation workstation:   [id number]    CT head wo IV contrast    Result Date: 4/9/2025  Interpreted By:  Adal Landeros, STUDY: CT HEAD WO IV CONTRAST;  4/9/2025 12:42 pm   INDICATION: Signs/Symptoms:fall, hit head.   COMPARISON: None.   ACCESSION NUMBER(S): [id number]   ORDERING CLINICIAN: QIAN DELAROSA   TECHNIQUE: Noncontrast axial CT scan of head was performed. Angled reformats in  brain and bone windows were generated. The images were reviewed in bone, brain, blood and soft tissue windows.   FINDINGS: CSF Spaces: The ventricles, sulci and basal cisterns are within normal limits. There is no extraaxial fluid collection.   Parenchyma: Moderate parenchymal atrophy. Periventricular and subcortical white matter hypoattenuation is nonspecific, however likely reflects chronic microvascular ischemic versus chronic hypertensive changes. The grey-white differentiation is intact. There is no mass effect or midline shift.  There is no intracranial hemorrhage. Minimal density along the periphery of the bifrontal regions (series 4, images 20 9-38) is favored to be secondary to a mixture of motion artifact and localized beam hardening artifact   Calvarium: No acute displaced calvarial fracture. Infiltration of the right frontal scalp suggestive of localized soft tissue injury.   Paranasal sinuses and mastoids: Mastoid air cells appear clear. 2.4 x 2.0 x 2.8 cm rounded cystic lesion demonstrating peripheral calcification in the region peripheral to the fossa of Rosenmuller. Presumed cerumen within the bilateral external auditory canals. Mild mucosal thickening of the bilateral ethmoid and right frontal sinus.       No evidence of acute cortical infarct or intracranial hemorrhage.   2.4 x 2.0 x 2.8 cm rounded cystic lesion containing peripheral calcification in the region peripheral to the fossa of Rosenmuller. Follow-up nonemergent MRI soft tissue neck protocol advised for further assessment.   MACRO: None     Signed by: Adal Landeros 4/9/2025 12:53 PM Dictation workstation:   [id number]          Results for orders placed or performed during the hospital encounter of 04/09/25 (from the past 24 hours)   CBC   Result Value Ref Range    WBC 7.7 4.4 - 11.3 x10*3/uL    nRBC 0.0 0.0 - 0.0 /100 WBCs    RBC 2.95 (L) 4.50 - 5.90 x10*6/uL    Hemoglobin 8.9 (L) 13.5 - 17.5 g/dL    Hematocrit 27.2 (L) 41.0 - 52.0 %     MCV 92 80 - 100 fL    MCH 30.2 26.0 - 34.0 pg    MCHC 32.7 32.0 - 36.0 g/dL    RDW 14.2 11.5 - 14.5 %    Platelets 158 150 - 450 x10*3/uL   Basic metabolic panel   Result Value Ref Range    Glucose 104 (H) 74 - 99 mg/dL    Sodium 143 136 - 145 mmol/L    Potassium 4.1 3.5 - 5.3 mmol/L    Chloride 110 (H) 98 - 107 mmol/L    Bicarbonate 26 21 - 32 mmol/L    Anion Gap 11 10 - 20 mmol/L    Urea Nitrogen 48 (H) 6 - 23 mg/dL    Creatinine 2.35 (H) 0.50 - 1.30 mg/dL    eGFR 30 (L) >60 mL/min/1.73m*2    Calcium 7.7 (L) 8.6 - 10.3 mg/dL   Phosphorus   Result Value Ref Range    Phosphorus 3.2 2.5 - 4.9 mg/dL   Magnesium   Result Value Ref Range    Magnesium 2.03 1.60 - 2.40 mg/dL        Assessment/Plan   Right medial thigh/knee hematoma  Blood loss anemia  THONY on top of CKD     Patient is postoperative day 2 from hematoma evacuation of the right medial thigh.  Overall, he is doing quite well in the area is significantly decompressed.  There was no active bleeding identified intraoperatively.     Recommendations:  -Keep KATTY drain for 1 more day, plan for discontinuation tomorrow  -Keep leg compressed from the toes to mid thigh.    - Elevate leg is much as possible  -Dressing to be changed by vascular surgery daily.  -Consult orthopedics to ensure no knee joint involvement.  -Pain control  PT/OT, up out of bed.-             I spent 35 minutes in the professional and overall care of this patient.      Andrey Egan, APRN-CNP

## 2025-04-13 ENCOUNTER — APPOINTMENT (OUTPATIENT)
Dept: RADIOLOGY | Facility: HOSPITAL | Age: 65
End: 2025-04-13
Payer: COMMERCIAL

## 2025-04-13 VITALS
HEART RATE: 54 BPM | BODY MASS INDEX: 44.04 KG/M2 | RESPIRATION RATE: 17 BRPM | SYSTOLIC BLOOD PRESSURE: 129 MMHG | WEIGHT: 290.57 LBS | OXYGEN SATURATION: 91 % | TEMPERATURE: 97.3 F | HEIGHT: 68 IN | DIASTOLIC BLOOD PRESSURE: 68 MMHG

## 2025-04-13 LAB
ANION GAP SERPL CALCULATED.3IONS-SCNC: 10 MMOL/L (ref 10–20)
BUN SERPL-MCNC: 49 MG/DL (ref 6–23)
CALCIUM SERPL-MCNC: 8.1 MG/DL (ref 8.6–10.3)
CHLORIDE SERPL-SCNC: 108 MMOL/L (ref 98–107)
CO2 SERPL-SCNC: 27 MMOL/L (ref 21–32)
CREAT SERPL-MCNC: 1.95 MG/DL (ref 0.5–1.3)
EGFRCR SERPLBLD CKD-EPI 2021: 38 ML/MIN/1.73M*2
ERYTHROCYTE [DISTWIDTH] IN BLOOD BY AUTOMATED COUNT: 14.3 % (ref 11.5–14.5)
GLUCOSE SERPL-MCNC: 109 MG/DL (ref 74–99)
HCT VFR BLD AUTO: 28.9 % (ref 41–52)
HGB BLD-MCNC: 9.4 G/DL (ref 13.5–17.5)
MAGNESIUM SERPL-MCNC: 2.11 MG/DL (ref 1.6–2.4)
MCH RBC QN AUTO: 29.9 PG (ref 26–34)
MCHC RBC AUTO-ENTMCNC: 32.5 G/DL (ref 32–36)
MCV RBC AUTO: 92 FL (ref 80–100)
NRBC BLD-RTO: 0 /100 WBCS (ref 0–0)
PHOSPHATE SERPL-MCNC: 3.3 MG/DL (ref 2.5–4.9)
PLATELET # BLD AUTO: 186 X10*3/UL (ref 150–450)
POTASSIUM SERPL-SCNC: 4.2 MMOL/L (ref 3.5–5.3)
RBC # BLD AUTO: 3.14 X10*6/UL (ref 4.5–5.9)
SODIUM SERPL-SCNC: 141 MMOL/L (ref 136–145)
WBC # BLD AUTO: 8.3 X10*3/UL (ref 4.4–11.3)

## 2025-04-13 PROCEDURE — 2500000001 HC RX 250 WO HCPCS SELF ADMINISTERED DRUGS (ALT 637 FOR MEDICARE OP): Performed by: NURSE PRACTITIONER

## 2025-04-13 PROCEDURE — 2060000001 HC INTERMEDIATE ICU ROOM DAILY

## 2025-04-13 PROCEDURE — 85027 COMPLETE CBC AUTOMATED: CPT | Performed by: PHYSICIAN ASSISTANT

## 2025-04-13 PROCEDURE — 2500000004 HC RX 250 GENERAL PHARMACY W/ HCPCS (ALT 636 FOR OP/ED): Performed by: SURGERY

## 2025-04-13 PROCEDURE — 99024 POSTOP FOLLOW-UP VISIT: CPT | Performed by: NURSE PRACTITIONER

## 2025-04-13 PROCEDURE — 36415 COLL VENOUS BLD VENIPUNCTURE: CPT | Performed by: PHYSICIAN ASSISTANT

## 2025-04-13 PROCEDURE — 80048 BASIC METABOLIC PNL TOTAL CA: CPT | Performed by: PHYSICIAN ASSISTANT

## 2025-04-13 PROCEDURE — 94640 AIRWAY INHALATION TREATMENT: CPT

## 2025-04-13 PROCEDURE — 2500000004 HC RX 250 GENERAL PHARMACY W/ HCPCS (ALT 636 FOR OP/ED): Performed by: NURSE PRACTITIONER

## 2025-04-13 PROCEDURE — 73721 MRI JNT OF LWR EXTRE W/O DYE: CPT | Mod: RT

## 2025-04-13 PROCEDURE — 94664 DEMO&/EVAL PT USE INHALER: CPT

## 2025-04-13 PROCEDURE — 84100 ASSAY OF PHOSPHORUS: CPT | Performed by: PHYSICIAN ASSISTANT

## 2025-04-13 PROCEDURE — 73721 MRI JNT OF LWR EXTRE W/O DYE: CPT | Mod: RIGHT SIDE | Performed by: RADIOLOGY

## 2025-04-13 PROCEDURE — 99232 SBSQ HOSP IP/OBS MODERATE 35: CPT | Performed by: STUDENT IN AN ORGANIZED HEALTH CARE EDUCATION/TRAINING PROGRAM

## 2025-04-13 PROCEDURE — 83735 ASSAY OF MAGNESIUM: CPT | Performed by: PHYSICIAN ASSISTANT

## 2025-04-13 RX ORDER — ALBUTEROL SULFATE 0.83 MG/ML
3 SOLUTION RESPIRATORY (INHALATION) EVERY 6 HOURS PRN
Status: DISCONTINUED | OUTPATIENT
Start: 2025-04-13 | End: 2025-04-18 | Stop reason: HOSPADM

## 2025-04-13 RX ORDER — ASPIRIN 81 MG/1
81 TABLET ORAL DAILY
Status: DISCONTINUED | OUTPATIENT
Start: 2025-04-13 | End: 2025-04-18 | Stop reason: HOSPADM

## 2025-04-13 RX ORDER — CARVEDILOL 25 MG/1
25 TABLET ORAL
Status: DISCONTINUED | OUTPATIENT
Start: 2025-04-13 | End: 2025-04-18 | Stop reason: HOSPADM

## 2025-04-13 RX ORDER — FUROSEMIDE 40 MG/1
40 TABLET ORAL DAILY
Status: DISCONTINUED | OUTPATIENT
Start: 2025-04-13 | End: 2025-04-18 | Stop reason: HOSPADM

## 2025-04-13 RX ORDER — FLUTICASONE FUROATE AND VILANTEROL 100; 25 UG/1; UG/1
1 POWDER RESPIRATORY (INHALATION)
Status: DISCONTINUED | OUTPATIENT
Start: 2025-04-13 | End: 2025-04-18 | Stop reason: HOSPADM

## 2025-04-13 RX ADMIN — CEFAZOLIN SODIUM 2 G: 2 SOLUTION INTRAVENOUS at 00:07

## 2025-04-13 RX ADMIN — HYDROMORPHONE HYDROCHLORIDE 0.2 MG: 1 INJECTION, SOLUTION INTRAMUSCULAR; INTRAVENOUS; SUBCUTANEOUS at 10:54

## 2025-04-13 RX ADMIN — ASPIRIN 81 MG: 81 TABLET, COATED ORAL at 08:51

## 2025-04-13 RX ADMIN — OXYCODONE HYDROCHLORIDE 5 MG: 5 TABLET ORAL at 08:45

## 2025-04-13 RX ADMIN — HYDROMORPHONE HYDROCHLORIDE 0.2 MG: 1 INJECTION, SOLUTION INTRAMUSCULAR; INTRAVENOUS; SUBCUTANEOUS at 19:10

## 2025-04-13 RX ADMIN — CARVEDILOL 25 MG: 25 TABLET, FILM COATED ORAL at 08:51

## 2025-04-13 RX ADMIN — TIOTROPIUM BROMIDE INHALATION SPRAY 2 PUFF: 3.12 SPRAY, METERED RESPIRATORY (INHALATION) at 09:09

## 2025-04-13 RX ADMIN — OXYCODONE HYDROCHLORIDE 5 MG: 5 TABLET ORAL at 18:38

## 2025-04-13 RX ADMIN — CARVEDILOL 25 MG: 25 TABLET, FILM COATED ORAL at 17:09

## 2025-04-13 RX ADMIN — POLYETHYLENE GLYCOL 3350 17 G: 17 POWDER, FOR SOLUTION ORAL at 08:45

## 2025-04-13 RX ADMIN — CEFAZOLIN SODIUM 2 G: 2 SOLUTION INTRAVENOUS at 12:43

## 2025-04-13 RX ADMIN — FLUTICASONE FUROATE AND VILANTEROL TRIFENATATE 1 PUFF: 100; 25 POWDER RESPIRATORY (INHALATION) at 09:12

## 2025-04-13 ASSESSMENT — PAIN SCALES - GENERAL
PAINLEVEL_OUTOF10: 8
PAINLEVEL_OUTOF10: 0 - NO PAIN
PAINLEVEL_OUTOF10: 8
PAINLEVEL_OUTOF10: 4
PAINLEVEL_OUTOF10: 9
PAINLEVEL_OUTOF10: 6

## 2025-04-13 ASSESSMENT — COGNITIVE AND FUNCTIONAL STATUS - GENERAL
TOILETING: A LITTLE
DAILY ACTIVITIY SCORE: 19
HELP NEEDED FOR BATHING: A LITTLE
DRESSING REGULAR LOWER BODY CLOTHING: A LOT
TURNING FROM BACK TO SIDE WHILE IN FLAT BAD: A LITTLE
MOVING TO AND FROM BED TO CHAIR: A LOT
MOVING FROM LYING ON BACK TO SITTING ON SIDE OF FLAT BED WITH BEDRAILS: A LITTLE
MOVING FROM LYING ON BACK TO SITTING ON SIDE OF FLAT BED WITH BEDRAILS: A LITTLE
STANDING UP FROM CHAIR USING ARMS: A LOT
CLIMB 3 TO 5 STEPS WITH RAILING: A LOT
DRESSING REGULAR LOWER BODY CLOTHING: A LOT
WALKING IN HOSPITAL ROOM: A LOT
CLIMB 3 TO 5 STEPS WITH RAILING: A LOT
MOVING TO AND FROM BED TO CHAIR: A LOT
DRESSING REGULAR UPPER BODY CLOTHING: A LITTLE
WALKING IN HOSPITAL ROOM: A LOT
TOILETING: A LITTLE
DRESSING REGULAR UPPER BODY CLOTHING: A LITTLE
HELP NEEDED FOR BATHING: A LITTLE
DAILY ACTIVITIY SCORE: 19
MOBILITY SCORE: 14
STANDING UP FROM CHAIR USING ARMS: A LOT
TURNING FROM BACK TO SIDE WHILE IN FLAT BAD: A LITTLE
MOBILITY SCORE: 14

## 2025-04-13 ASSESSMENT — PAIN DESCRIPTION - LOCATION
LOCATION: KNEE
LOCATION: LEG
LOCATION: LEG

## 2025-04-13 ASSESSMENT — PAIN DESCRIPTION - ORIENTATION
ORIENTATION: RIGHT

## 2025-04-13 ASSESSMENT — PAIN - FUNCTIONAL ASSESSMENT
PAIN_FUNCTIONAL_ASSESSMENT: 0-10

## 2025-04-13 NOTE — PROGRESS NOTES
"Colten Eason is a 64 y.o. male on day 4 of admission presenting with Fall, initial encounter.    Subjective   Still with knee pain. Tolerating diet without issue.  KATTY was removed by vascular surgery earlier today       Objective     Physical Exam  Vitals and nursing note reviewed.   Constitutional:       Appearance: Normal appearance.   HENT:      Head: Normocephalic and atraumatic.      Mouth/Throat:      Mouth: Mucous membranes are moist.      Pharynx: Oropharynx is clear.   Eyes:      Extraocular Movements: Extraocular movements intact.      Pupils: Pupils are equal, round, and reactive to light.   Cardiovascular:      Rate and Rhythm: Normal rate and regular rhythm.      Pulses: Normal pulses.   Pulmonary:      Effort: Pulmonary effort is normal.      Breath sounds: Normal breath sounds.      Comments: Wearing CPAP on my arrival  Abdominal:      General: There is no distension.      Palpations: Abdomen is soft.      Tenderness: There is no abdominal tenderness.   Musculoskeletal:      Cervical back: Normal range of motion and neck supple.      Comments: RLE in ACE wrap   Skin:     General: Skin is warm and dry.   Neurological:      General: No focal deficit present.      Mental Status: He is alert and oriented to person, place, and time.   Psychiatric:         Mood and Affect: Mood normal.         Behavior: Behavior normal.         Last Recorded Vitals  Blood pressure 141/69, pulse 84, temperature 36.8 °C (98.2 °F), temperature source Temporal, resp. rate 19, height 1.727 m (5' 8\"), weight 132 kg (290 lb 9.1 oz), SpO2 93%.  Intake/Output last 3 Shifts:  I/O last 3 completed shifts:  In: 50 (0.4 mL/kg) [IV Piggyback:50]  Out: 3585 (27.2 mL/kg) [Urine:3500 (0.7 mL/kg/hr); Drains:85]  Weight: 131.8 kg     Relevant Results  No results found.  Results for orders placed or performed during the hospital encounter of 04/09/25 (from the past 24 hours)   CBC   Result Value Ref Range    WBC 8.3 4.4 - 11.3 x10*3/uL    " nRBC 0.0 0.0 - 0.0 /100 WBCs    RBC 3.14 (L) 4.50 - 5.90 x10*6/uL    Hemoglobin 9.4 (L) 13.5 - 17.5 g/dL    Hematocrit 28.9 (L) 41.0 - 52.0 %    MCV 92 80 - 100 fL    MCH 29.9 26.0 - 34.0 pg    MCHC 32.5 32.0 - 36.0 g/dL    RDW 14.3 11.5 - 14.5 %    Platelets 186 150 - 450 x10*3/uL   Basic metabolic panel   Result Value Ref Range    Glucose 109 (H) 74 - 99 mg/dL    Sodium 141 136 - 145 mmol/L    Potassium 4.2 3.5 - 5.3 mmol/L    Chloride 108 (H) 98 - 107 mmol/L    Bicarbonate 27 21 - 32 mmol/L    Anion Gap 10 10 - 20 mmol/L    Urea Nitrogen 49 (H) 6 - 23 mg/dL    Creatinine 1.95 (H) 0.50 - 1.30 mg/dL    eGFR 38 (L) >60 mL/min/1.73m*2    Calcium 8.1 (L) 8.6 - 10.3 mg/dL   Phosphorus   Result Value Ref Range    Phosphorus 3.3 2.5 - 4.9 mg/dL   Magnesium   Result Value Ref Range    Magnesium 2.11 1.60 - 2.40 mg/dL              Assessment/Plan   Assessment & Plan  Fall, initial encounter    Hematoma      S/p evacuation of RLQ hematoma by vascular surgery on 4/10    Daily wound care per vascular, KATTY removed today  Ortho consulted for persistent knee pain and MRI pending  Hb leveling out, daily labs  Baseline Cre 2, was up slightly yesterday, back to normal today      Imani Lu MD

## 2025-04-13 NOTE — CARE PLAN
Problem: Pain - Adult  Goal: Verbalizes/displays adequate comfort level or baseline comfort level  Outcome: Progressing     Problem: Safety - Adult  Goal: Free from fall injury  Outcome: Progressing     Problem: Discharge Planning  Goal: Discharge to home or other facility with appropriate resources  Outcome: Progressing     Problem: Chronic Conditions and Co-morbidities  Goal: Patient's chronic conditions and co-morbidity symptoms are monitored and maintained or improved  Outcome: Progressing     Problem: Nutrition  Goal: Nutrient intake appropriate for maintaining nutritional needs  Outcome: Progressing     Problem: Skin  Goal: Decreased wound size/increased tissue granulation at next dressing change  Outcome: Progressing  Flowsheets (Taken 4/13/2025 0840)  Decreased wound size/increased tissue granulation at next dressing change:   Protective dressings over bony prominences   Utilize specialty bed per algorithm  Goal: Participates in plan/prevention/treatment measures  Outcome: Progressing  Flowsheets (Taken 4/13/2025 0840)  Participates in plan/prevention/treatment measures:   Discuss with provider PT/OT consult   Elevate heels  Goal: Prevent/manage excess moisture  Outcome: Progressing  Flowsheets (Taken 4/13/2025 0840)  Prevent/manage excess moisture:   Cleanse incontinence/protect with barrier cream   Moisturize dry skin   Follow provider orders for dressing changes  Goal: Prevent/minimize sheer/friction injuries  Outcome: Progressing  Flowsheets (Taken 4/13/2025 0840)  Prevent/minimize sheer/friction injuries:   Complete micro-shifts as needed if patient unable. Adjust patient position to relieve pressure points, not a full turn   Use pull sheet   HOB 30 degrees or less   Turn/reposition every 2 hours/use positioning/transfer devices  Goal: Promote/optimize nutrition  Outcome: Progressing  Flowsheets (Taken 4/13/2025 0840)  Promote/optimize nutrition:   Consume > 50% meals/supplements   Offer  water/supplements/favorite foods   Monitor/record intake including meals  Goal: Promote skin healing  Outcome: Progressing  Flowsheets (Taken 4/13/2025 6104)  Promote skin healing:   Protective dressings over bony prominences   Turn/reposition every 2 hours/use positioning/transfer devices     Problem: Fall/Injury  Goal: Not fall by end of shift  Outcome: Progressing  Goal: Be free from injury by end of the shift  Outcome: Progressing  Goal: Verbalize understanding of personal risk factors for fall in the hospital  Outcome: Progressing  Goal: Verbalize understanding of risk factor reduction measures to prevent injury from fall in the home  Outcome: Progressing  Goal: Use assistive devices by end of the shift  Outcome: Progressing  Goal: Pace activities to prevent fatigue by end of the shift  Outcome: Progressing     Problem: Pain  Goal: Takes deep breaths with improved pain control throughout the shift  Outcome: Progressing  Goal: Turns in bed with improved pain control throughout the shift  Outcome: Progressing  Goal: Walks with improved pain control throughout the shift  Outcome: Progressing  Goal: Performs ADL's with improved pain control throughout shift  Outcome: Progressing  Goal: Participates in PT with improved pain control throughout the shift  Outcome: Progressing  Goal: Free from opioid side effects throughout the shift  Outcome: Progressing  Goal: Free from acute confusion related to pain meds throughout the shift  Outcome: Progressing   The patient's goals for the shift include Patient Safety    The clinical goals for the shift include monitor vs, labs, I&O's; manage pain; promote safe ambulation with assistance;  skin safety; rest

## 2025-04-14 PROBLEM — J44.9 COPD (CHRONIC OBSTRUCTIVE PULMONARY DISEASE) (MULTI): Status: ACTIVE | Noted: 2025-04-14

## 2025-04-14 PROBLEM — N18.9 CKD (CHRONIC KIDNEY DISEASE): Status: ACTIVE | Noted: 2025-04-14

## 2025-04-14 LAB
ANION GAP SERPL CALCULATED.3IONS-SCNC: 11 MMOL/L (ref 10–20)
BUN SERPL-MCNC: 55 MG/DL (ref 6–23)
CALCIUM SERPL-MCNC: 8.3 MG/DL (ref 8.6–10.3)
CHLORIDE SERPL-SCNC: 109 MMOL/L (ref 98–107)
CO2 SERPL-SCNC: 25 MMOL/L (ref 21–32)
CREAT SERPL-MCNC: 1.81 MG/DL (ref 0.5–1.3)
EGFRCR SERPLBLD CKD-EPI 2021: 41 ML/MIN/1.73M*2
ERYTHROCYTE [DISTWIDTH] IN BLOOD BY AUTOMATED COUNT: 14.2 % (ref 11.5–14.5)
GLUCOSE SERPL-MCNC: 112 MG/DL (ref 74–99)
HCT VFR BLD AUTO: 29.7 % (ref 41–52)
HGB BLD-MCNC: 9.8 G/DL (ref 13.5–17.5)
MAGNESIUM SERPL-MCNC: 2.23 MG/DL (ref 1.6–2.4)
MCH RBC QN AUTO: 30.2 PG (ref 26–34)
MCHC RBC AUTO-ENTMCNC: 33 G/DL (ref 32–36)
MCV RBC AUTO: 92 FL (ref 80–100)
NRBC BLD-RTO: 0 /100 WBCS (ref 0–0)
PHOSPHATE SERPL-MCNC: 3.3 MG/DL (ref 2.5–4.9)
PLATELET # BLD AUTO: 212 X10*3/UL (ref 150–450)
POTASSIUM SERPL-SCNC: 4.5 MMOL/L (ref 3.5–5.3)
RBC # BLD AUTO: 3.24 X10*6/UL (ref 4.5–5.9)
SODIUM SERPL-SCNC: 140 MMOL/L (ref 136–145)
WBC # BLD AUTO: 8.6 X10*3/UL (ref 4.4–11.3)

## 2025-04-14 PROCEDURE — 2500000001 HC RX 250 WO HCPCS SELF ADMINISTERED DRUGS (ALT 637 FOR MEDICARE OP): Performed by: NURSE PRACTITIONER

## 2025-04-14 PROCEDURE — 36415 COLL VENOUS BLD VENIPUNCTURE: CPT | Performed by: PHYSICIAN ASSISTANT

## 2025-04-14 PROCEDURE — 2500000004 HC RX 250 GENERAL PHARMACY W/ HCPCS (ALT 636 FOR OP/ED): Performed by: SURGERY

## 2025-04-14 PROCEDURE — 97530 THERAPEUTIC ACTIVITIES: CPT | Mod: GP

## 2025-04-14 PROCEDURE — 2060000001 HC INTERMEDIATE ICU ROOM DAILY

## 2025-04-14 PROCEDURE — 94640 AIRWAY INHALATION TREATMENT: CPT

## 2025-04-14 PROCEDURE — 84100 ASSAY OF PHOSPHORUS: CPT | Performed by: PHYSICIAN ASSISTANT

## 2025-04-14 PROCEDURE — 99024 POSTOP FOLLOW-UP VISIT: CPT | Performed by: NURSE PRACTITIONER

## 2025-04-14 PROCEDURE — 83735 ASSAY OF MAGNESIUM: CPT | Performed by: PHYSICIAN ASSISTANT

## 2025-04-14 PROCEDURE — 85027 COMPLETE CBC AUTOMATED: CPT | Performed by: PHYSICIAN ASSISTANT

## 2025-04-14 PROCEDURE — 80048 BASIC METABOLIC PNL TOTAL CA: CPT | Performed by: PHYSICIAN ASSISTANT

## 2025-04-14 PROCEDURE — 97162 PT EVAL MOD COMPLEX 30 MIN: CPT | Mod: GP

## 2025-04-14 PROCEDURE — 2500000004 HC RX 250 GENERAL PHARMACY W/ HCPCS (ALT 636 FOR OP/ED): Performed by: NURSE PRACTITIONER

## 2025-04-14 RX ORDER — HEPARIN SODIUM 5000 [USP'U]/ML
5000 INJECTION, SOLUTION INTRAVENOUS; SUBCUTANEOUS EVERY 8 HOURS
Status: DISCONTINUED | OUTPATIENT
Start: 2025-04-14 | End: 2025-04-18 | Stop reason: HOSPADM

## 2025-04-14 RX ADMIN — HEPARIN SODIUM 5000 UNITS: 5000 INJECTION INTRAVENOUS; SUBCUTANEOUS at 21:10

## 2025-04-14 RX ADMIN — CARVEDILOL 25 MG: 25 TABLET, FILM COATED ORAL at 17:30

## 2025-04-14 RX ADMIN — CEFAZOLIN SODIUM 2 G: 2 SOLUTION INTRAVENOUS at 12:02

## 2025-04-14 RX ADMIN — OXYCODONE HYDROCHLORIDE 5 MG: 5 TABLET ORAL at 22:12

## 2025-04-14 RX ADMIN — TIOTROPIUM BROMIDE INHALATION SPRAY 2 PUFF: 3.12 SPRAY, METERED RESPIRATORY (INHALATION) at 07:41

## 2025-04-14 RX ADMIN — FLUTICASONE FUROATE AND VILANTEROL TRIFENATATE 1 PUFF: 100; 25 POWDER RESPIRATORY (INHALATION) at 07:41

## 2025-04-14 RX ADMIN — CARVEDILOL 25 MG: 25 TABLET, FILM COATED ORAL at 08:38

## 2025-04-14 RX ADMIN — ASPIRIN 81 MG: 81 TABLET, COATED ORAL at 08:38

## 2025-04-14 RX ADMIN — OXYCODONE HYDROCHLORIDE 5 MG: 5 TABLET ORAL at 08:38

## 2025-04-14 RX ADMIN — HYDROMORPHONE HYDROCHLORIDE 0.2 MG: 1 INJECTION, SOLUTION INTRAMUSCULAR; INTRAVENOUS; SUBCUTANEOUS at 12:03

## 2025-04-14 RX ADMIN — CEFAZOLIN SODIUM 2 G: 2 SOLUTION INTRAVENOUS at 00:00

## 2025-04-14 RX ADMIN — POLYETHYLENE GLYCOL 3350 17 G: 17 POWDER, FOR SOLUTION ORAL at 08:38

## 2025-04-14 ASSESSMENT — COGNITIVE AND FUNCTIONAL STATUS - GENERAL
DRESSING REGULAR LOWER BODY CLOTHING: A LOT
PERSONAL GROOMING: A LOT
CLIMB 3 TO 5 STEPS WITH RAILING: A LOT
TURNING FROM BACK TO SIDE WHILE IN FLAT BAD: A LOT
CLIMB 3 TO 5 STEPS WITH RAILING: A LOT
HELP NEEDED FOR BATHING: A LOT
STANDING UP FROM CHAIR USING ARMS: A LOT
DAILY ACTIVITIY SCORE: 19
TOILETING: A LITTLE
TURNING FROM BACK TO SIDE WHILE IN FLAT BAD: A LITTLE
MOVING FROM LYING ON BACK TO SITTING ON SIDE OF FLAT BED WITH BEDRAILS: A LITTLE
WALKING IN HOSPITAL ROOM: A LOT
WALKING IN HOSPITAL ROOM: A LOT
MOBILITY SCORE: 12
MOVING TO AND FROM BED TO CHAIR: A LOT
MOVING TO AND FROM BED TO CHAIR: A LOT
EATING MEALS: A LITTLE
TOILETING: A LOT
MOBILITY SCORE: 14
STANDING UP FROM CHAIR USING ARMS: A LOT
MOVING TO AND FROM BED TO CHAIR: A LOT
WALKING IN HOSPITAL ROOM: A LOT
DAILY ACTIVITIY SCORE: 13
DRESSING REGULAR UPPER BODY CLOTHING: A LOT
CLIMB 3 TO 5 STEPS WITH RAILING: TOTAL
TURNING FROM BACK TO SIDE WHILE IN FLAT BAD: A LOT
DRESSING REGULAR LOWER BODY CLOTHING: A LOT
DRESSING REGULAR UPPER BODY CLOTHING: A LITTLE
MOVING FROM LYING ON BACK TO SITTING ON SIDE OF FLAT BED WITH BEDRAILS: A LOT
MOBILITY SCORE: 11
STANDING UP FROM CHAIR USING ARMS: A LOT
MOVING FROM LYING ON BACK TO SITTING ON SIDE OF FLAT BED WITH BEDRAILS: A LOT
HELP NEEDED FOR BATHING: A LITTLE

## 2025-04-14 ASSESSMENT — PAIN SCALES - GENERAL
PAINLEVEL_OUTOF10: 8
PAINLEVEL_OUTOF10: 0 - NO PAIN
PAINLEVEL_OUTOF10: 7
PAINLEVEL_OUTOF10: 4
PAINLEVEL_OUTOF10: 7

## 2025-04-14 ASSESSMENT — ACTIVITIES OF DAILY LIVING (ADL)
ADLS_ADDRESSED: NO
EFFECT OF PAIN ON DAILY ACTIVITIES: YES
ADL_ASSISTANCE: INDEPENDENT

## 2025-04-14 ASSESSMENT — PAIN DESCRIPTION - LOCATION
LOCATION: LEG
LOCATION: LEG

## 2025-04-14 ASSESSMENT — PAIN - FUNCTIONAL ASSESSMENT
PAIN_FUNCTIONAL_ASSESSMENT: 0-10

## 2025-04-14 ASSESSMENT — PAIN SCALES - WONG BAKER: WONGBAKER_NUMERICALRESPONSE: HURTS WHOLE LOT

## 2025-04-14 ASSESSMENT — PAIN DESCRIPTION - DESCRIPTORS: DESCRIPTORS: ACHING

## 2025-04-14 ASSESSMENT — PAIN DESCRIPTION - ORIENTATION
ORIENTATION: RIGHT
ORIENTATION: RIGHT

## 2025-04-14 NOTE — PROGRESS NOTES
Physical Therapy    Physical Therapy Evaluation & Treatment    Patient Name: Colten Eason  MRN: [id number]  Department: [address]  Room: 08/08-A  Today's Date: 4/14/2025   Time Calculation  Start Time: 1328  Stop Time: 1356  Time Calculation (min): 28 min    Assessment/Plan   PT Assessment  PT Assessment Results: Decreased strength, Decreased range of motion, Decreased endurance, Impaired balance, Decreased mobility, Decreased coordination, Decreased safety awareness, Decreased skin integrity  Rehab Prognosis: Good  Barriers to Discharge Home: Caregiver assistance, Physical needs  Caregiver Assistance: Caregiver assistance needed per identified barriers - however, level of patient's required assistance exceeds assistance available at home  Physical Needs: Stair navigation into home limited by function/safety, Intermittent mobility assistance needed, Intermittent ADL assistance needed, High falls risk due to function or environment  Evaluation/Treatment Tolerance: Patient limited by pain  Medical Staff Made Aware: Yes  Strengths: Premorbid level of function  Barriers to Participation: Comorbidities, Insight into problems  End of Session Communication: Bedside nurse  Assessment Comment: pt with decreased functional mobility, increased weakness, impaired tolerance to activity, and poor safety awareness. pt is functioning below his baseline and will benefit from continued skilled PT services to improve his functional performance and maximize safety.  End of Session Patient Position: Bed, 3 rail up, Alarm on (needs in reach)   IP OR SWING BED PT PLAN  Inpatient or Swing Bed: Inpatient  PT Plan  Treatment/Interventions: Bed mobility, Transfer training, Gait training, Stair training, Balance training, Strengthening, Endurance training, Range of motion, Therapeutic exercise, Therapeutic activity  PT Plan: Ongoing PT  PT Frequency: 5 times per week  PT Discharge Recommendations: Moderate intensity level of continued  care  Equipment Recommended upon Discharge: Wheeled walker  PT Recommended Transfer Status: Assist x1, Assistive device  PT - OK to Discharge: Yes      Subjective     General Visit Information:  General  Reason for Referral: Impaired Mobility  Referred By: PETRONA Godfrey  Past Medical History Relevant to Rehab: CKD, COPD, HTN, GENESIS  Family/Caregiver Present: No  Prior to Session Communication: Bedside nurse  Patient Position Received: Bed, 4 rail up, Alarm off, not on at start of session  Preferred Learning Style: verbal  General Comment: 64 y.o. male admitted after a fall on concrete having hit his right knee and head. pt is s/p hematoma evacuation right thigh 04/10/25.  Home Living:  Home Living  Type of Home: House  Lives With: Spouse  Home Adaptive Equipment: None  Home Layout: Multi-level, Able to live on main level with bedroom/bathroom  Home Access: Stairs to enter without rails  Entrance Stairs-Rails: None  Entrance Stairs-Number of Steps: 3-4  Bathroom Shower/Tub: Tub/shower unit  Bathroom Toilet: Standard  Bathroom Equipment: None  Prior Level of Function:  Prior Function Per Pt/Caregiver Report  Level of [place]: Independent with ADLs and functional transfers, Independent with homemaking with ambulation  ADL Assistance: Independent  Homemaking Assistance: Independent (shares with spouse)  Ambulatory Assistance: Independent  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)  Hand Dominance: Left  Prior Function Comments: denies falls other than this one  Precautions:  Precautions  Hearing/Visual Limitations: does not use his glasses  LE Weight Bearing Status: Weight Bearing as Tolerated  Medical Precautions: Fall precautions     Date/Time Vitals Session Patient Position Pulse Resp SpO2 BP MAP (mmHg)    04/14/25 1537 --  --  79  18  94 %  140/83  95           Objective   Pain:  Pain Assessment  Pain Assessment: 0-10  0-10 (Numeric) Pain Score: 7  Pain Type: Surgical  pain  Pain Location: Knee  Pain Orientation: Right  Pain Interventions: Repositioned, Ambulation/increased activity  Response to Interventions: Resting quietly  Cognition:  Cognition  Overall Cognitive Status: Within Functional Limits  Orientation Level: Oriented X4    General Assessments:  General Observation  General Observation: R LE ace wrap dry and intact, L LE skin discoloration/hemosiderin staining    Activity Tolerance  Endurance: Decreased tolerance for upright activites  Activity Tolerance Comments: limited by pain  Rate of Perceived Exertion (RPE): 4    Sensation  Sensation Comment: decreased sensation B feet    Coordination  Coordination Comment: very guarded, increased time and effort to complete tasks    Postural Control  Posture Comment: forward flexed at hips and trunk    Static Sitting Balance  Static Sitting-Comment/Number of Minutes: good balance seated on EOB    Static Standing Balance  Static Standing-Comment/Number of Minutes: fair+ balance with walker  Dynamic Standing Balance  Dynamic Standing-Comments: fair- balance while side stepping with rolling walker  Functional Assessments:  ADL  ADL's Addressed: No    Bed Mobility  Bed Mobility: Yes  Bed Mobility 1  Bed Mobility 1: Supine to sitting  Level of Assistance 1: Maximum assistance  Bed Mobility Comments 1: min A to move L LE, max A to advance right LE off EOB, min A to help guide trunk up. increased time and effort to scoot forward to allow B feet to touch the floor  Bed Mobility 2  Bed Mobility  2: Sitting to supine  Level of Assistance 2: Maximum assistance  Bed Mobility Comments 2: pt able to manage trunk, max A to lift R LE onto bed, mod A to lift L LE. pt able to reposition self in center of bed with min A.    Transfers  Transfer: Yes  Transfer 1  Transfer From 1: Bed to  Transfer to 1: Stand  Technique 1: Sit to stand  Transfer Level of Assistance 1: Minimum assistance  Trials/Comments 1: pt requesting bedside chairs be moved closer  to him-using the arms of the chair for support, pt declined use of walker, assist to guide trunk up and establish COG over SARAH. wide SARAH, decreased weight bearing thorugh TEMITOPE STUART.  Transfers 2  Transfer From 2: Stand to  Transfer to 2: Bed  Technique 2: Stand to sit  Transfer Level of Assistance 2: Minimum assistance  Trials/Comments 2: verbal cues for hand placement and to try to kick R LE in front of him as he sits down. min A to complete.    Ambulation/Gait Training  Ambulation/Gait Training Performed: Yes  Ambulation/Gait Training 1  Surface 1: Level tile  Device 1: Rolling walker  Assistance 1: Moderate assistance  Quality of Gait 1: Wide base of support, Diminished heel strike, Decreased step length, Forward flexed posture, Soft knee(s)  Comments/Distance (ft) 1: Amb 3 steps forward, backward, and sideways. instructed in sequencing, manual guidance of walker, poor B knee control, heavy reliance on walker with B UEs for support.    Stairs  Stairs: No  Extremity/Trunk Assessments:  RLE   RLE :  (poor ROM R knee (pain-limiting), + weakness, poor knee control with weight bearing)  LLE   LLE : Within Functional Limits    Outcome Measures:  [facility] Basic Mobility  Turning from your back to your side while in a flat bed without using bedrails: A lot  Moving from lying on your back to sitting on the side of a flat bed without using bedrails: A lot  Moving to and from bed to chair (including a wheelchair): A lot  Standing up from a chair using your arms (e.g. wheelchair or bedside chair): A lot  To walk in hospital room: A lot  Climbing 3-5 steps with railing: Total  Basic Mobility - Total Score: 11    Encounter Problems       Encounter Problems (Active)       PT STG Problem       Patient will transfer supine to sit and sit to supine with supervision assist to facilitate mobility. (Progressing)       Start:  04/14/25    Expected End:  04/30/25            Patient will transfer sit to stand and stand to sit with supervision  assist to facilitate mobility. (Progressing)       Start:  04/14/25    Expected End:  04/30/25            Patient will transfer bed to chair and chair to bed with supervision assist to facilitate mobility. (Progressing)       Start:  04/14/25    Expected End:  04/30/25            Patient will amb 50 feet rolling walker device including two turns on even surface with supervision assist to facilitate safe mobility.   (Progressing)       Start:  04/14/25    Expected End:  04/30/25            Patient will negotiate 4 stairs with no rail(s) and minimal} assist with straight cane device for in home and community. (Not Progressing)       Start:  04/14/25    Expected End:  04/30/25            Patient will increase ROM by 1/2 range throughout right lower extremity to improve functional mobility. (Progressing)       Start:  04/14/25    Expected End:  04/30/25                   Education Documentation  Body Mechanics, taught by Augustina Costa PT at 4/14/2025  3:41 PM.  Learner: Patient  Readiness: Acceptance  Method: Explanation  Response: Verbalizes Understanding    Mobility Training, taught by Augustina Costa PT at 4/14/2025  3:41 PM.  Learner: Patient  Readiness: Acceptance  Method: Explanation  Response: Verbalizes Understanding    Education Comments  04/14/25 1541 Augustina Costa PT  Educated on PT POC

## 2025-04-14 NOTE — PROGRESS NOTES
"Colten Eason is a 64 y.o. male on day 4 of admission presenting with Fall, initial encounter.    Subjective   Patient seen and examined.  He is status post hematoma evacuation on Friday.  States he has less pain today than what he did previously.       Objective     Physical Exam    General: Pt is alert and oriented x 3. Pleasant, conversive  HEENT: Head is atraumatic, normocephalic.   Cardiac: Normal S1-S2.  Regular rate and rhythm.  No murmurs.  Respiratory: Lungs clear to auscultation.  No adventitious sounds.  Abdomen: Soft, nondistended, nontender.  Bowel sounds x4 quadrants.  Protuberant, morbidly obese  Pulse exam: Palpable brachial and radial pulses bilaterally.   Femoral, popliteal, pedal pulses are palpable bilaterally.  Extremities: 2+ pitting edema right lower extremity.  The right medial thigh/knee hematoma has been decompressed quite significantly.  The fluid-filled bulla to the medial aspect of the thigh has ruptured.  Wound with tissue is mostly granular.  KATTY drain was removed today by myself.  Surgical incision near the knee is well-approximated with sutures in place.  There is some erythema janine-incisional he.  Neuro: Moves all extremities spontaneously.  No focal deficits.  Psych: Appropriate affect.  Answers questions appropriately        Last Recorded Vitals  Blood pressure 145/85, pulse 83, temperature 36.3 °C (97.3 °F), temperature source Temporal, resp. rate 17, height 1.727 m (5' 8\"), weight 132 kg (290 lb 9.1 oz), SpO2 93%.  Intake/Output last 3 Shifts:  I/O last 3 completed shifts:  In: 50 (0.4 mL/kg) [IV Piggyback:50]  Out: 4865 (36.9 mL/kg) [Urine:4800 (1 mL/kg/hr); Drains:65]  Weight: 131.8 kg     Relevant Results  Scheduled medications  aspirin, 81 mg, oral, Daily  carvedilol, 25 mg, oral, BID  ceFAZolin, 2 g, intravenous, q12h  tiotropium, 2 puff, inhalation, Daily   And  fluticasone furoate-vilanteroL, 1 puff, inhalation, Daily  [Held by provider] furosemide, 40 mg, oral, " Daily  polyethylene glycol, 17 g, oral, Daily      Continuous medications     PRN medications  PRN medications: acetaminophen **OR** acetaminophen **OR** acetaminophen, albuterol, HYDROmorphone, ondansetron ODT **OR** ondansetron, oxyCODONE     MR knee right wo IV contrast    Result Date: 4/13/2025  Interpreted By:  Ignacio Agosto, STUDY: MRI of the right knee without contrast dated 4/13/2025.   INDICATION: Signs/Symptoms:R quadricep tendon injury   COMPARISON: None.   ACCESSION NUMBER(S): [id number]   ORDERING CLINICIAN: HORTENCIA LEBRON   TECHNIQUE: Multiplanar multisequence MRI of the right knee was performed without intravenous contrast.   FINDINGS: MUSCLES, TENDONS, AND LIGAMENTS:   The anterior cruciate ligament is intact.  The posterior cruciate ligament is intact. The medial collateral ligament is intact. The lateral collateral ligament complex is intact. The popliteus and biceps femoris tendons, iliotibial band, and extensor mechanism are intact.   MENISCI:   The medial meniscus is intact.  The lateral meniscus is intact.   OSSEOUS STRUCTURES AND JOINTS:   No fracture or dislocation is evident. There is marginal femorotibial and patellofemoral osteophyte formation. As seen on this exam of the hyaline articular cartilage in the femorotibial joint spaces is intact. There is full-thickness fissuring/loss of hyaline articular cartilage of the patellar apex with some foci of mild-to-moderate partial-thickness fissuring of the hyaline articular cartilage of the medial and lateral facets of the patella. There is a moderate volume knee joint effusion.   SOFT TISSUES:   No significant volume of fluid is evident in a popliteal cyst. Extensive reticular increased T2 signal intensity is seen in the subcutaneous tissues. In the subcutaneous tissues of the anterior to anteromedial proximal leg there is a fusiform heterogeneous collection measuring up to approximately 1.5 x 9.3 x 5.8 cm. It is predominantly  hypointense on T2 weighted imaging with regions of some higher signal intensity, and heterogeneous with regions of hyperintensity and hypointensity on T1 weighted imaging.       1. Findings most compatible with evolving hematoma in the subcutaneous tissues of the anterior to anteromedial proximal leg. 2. Diffuse reticular increased T2 signal intensity in the subcutaneous tissues which may represent lymphedema and/or cellulitis. 3. As seen on this examination of the quadriceps tendon appears to be intact. If there is concern for compromise of the quadriceps more proximally in the thigh, thigh MRI is recommended. 4. Moderate volume knee joint effusion. 5. Mild-to-moderate degenerative change of the knee.   Signed by: Ignacio Agosto 4/13/2025 1:53 PM Dictation workstation:   [id number]    CT angio lower extremity right w and or wo IV contrast    Result Date: 4/9/2025  STUDY: CT CTA LOWER EXTREMITY RIGHT; 04/09/2025 06:01 PM INDICATION:  Right lower extremity hematoma, evaluate for vessel dissection. COMPARISON: CT Knee 04/09/2025; XR Femur 04/09/2025. ACCESSION NUMBER(S): [id number] ORDERING CLINICIAN: TECHNIQUE:  Helical CT is performed from the infrarenal aorta through the feet after bolus administration of 120 mL of Omnipaque 350. Images are reviewed and processed at a workstation according to the CT angiogram protocol with 3-D and/or MIP post processing imaging generated.  2236 DICOM images received. Automated mA/kV exposure control was utilized and patient examination was performed in strict accordance with principles of ALARA. FINDINGS: No stenoses are seen within the visualized portions of the right superficial femoral artery, or within the right popliteal artery.  No intimal flap is visualized to indicate a dissection.  There are small contrast blush is seen in the hematomas in the medial aspect of the distal right thigh, and anterior medial aspect of the right knee.  It is indeterminate whether or not  there are branches from the profunda femoral artery or the geniculate branches from the popliteal artery are contributing to these hematomas.    1.  Contrast blushing is in the two hematomas located in the medial aspect of the distal right thigh and anteromedial aspect of the right knee, suggestive of an arterial bleed.  It is indeterminate whether or not these are contributed by branches of the profunda femoral artery, or the geniculate arteries. 2.  No stenoses or dissections within the visualized portions of the right superficial femoral artery, or right popliteal artery. This report was called to Dr. Ricardo Cook at 7:40 PM on April 9, 2025. Signed by Nik Hart MD    XR hand right 3+ views    Result Date: 4/9/2025  Interpreted By:  Ludwig Briggs, STUDY: XR HAND RIGHT 3+ VIEWS   INDICATION: Signs/Symptoms:right hand pain.   COMPARISON: None   ACCESSION NUMBER(S): [id number]   ORDERING CLINICIAN: QIAN DELAROSA   FINDINGS: Mild osteoarthritis right hand more advanced at the 1st [facility]. No evidence of fracture or malalignment       Osteoarthritis right hand most prominent at the 1st [facility].   Signed by: Ludwig Briggs 4/9/2025 5:28 PM Dictation workstation:   [id number]    CT knee right wo IV contrast    Result Date: 4/9/2025  STUDY: CT Extremity; Completed Time:  4/9/2025 3:12 PM INDICATION: Evaluate for soft tissue hematoma. COMPARISON: XR RT femur 4/9/2025. ACCESSION NUMBER(S): [id number] ORDERING CLINICIAN: QIAN DELAROSA TECHNIQUE:  Thin section axial images were obtained through the right knee without intravenous contrast.  Orthogonal reconstructed images were obtained and reviewed.  Automated mA/kV exposure control was utilized and patient examination was performed in strict accordance with principles of ALARA. FINDINGS: No acute fractures or dislocations are visualized within the right knee.  There is no evidence of patellar subluxation.  No suprapatellar effusion is appreciated.  There is prominent soft tissue  swelling over the anterior aspect of the knee.  There is a hyperdense fluid collection, indicative of a hematoma.  It is estimated at 14.5 x 9.9 x 5.2 cm.  There is impingement noted upon the vastus medialis of the quadriceps muscle.  However, this does not appear to involve the musculature.  There is a second hematoma over the medial aspect the distal thigh, measured at 11.7 x 8.6 x 2.7 cm.  There is generalized subcutaneous edema over the knee.    1.  Large hematoma over the anteromedial aspect of the right knee. 2.  Additional hematoma over the medial aspect of the distal right thigh. 3.  No acute osseous findings involving the right knee. 4.  No prominent hematoma appreciated within the surrounding musculature.  5.  Subcutaneous edema overlying the right knee. Signed by Nik Hart MD    XR femur right 2+ views    Result Date: 4/9/2025  STUDY: Femur Radiographs; 4/9/2025 12:50 PM INDICATION: Right thigh pain. COMPARISON: None Available. ACCESSION NUMBER(S): [id number] ORDERING CLINICIAN: QIAN DELAROSA TECHNIQUE:  Two view(s) of the right femur (five images). FINDINGS:  There is no displaced fracture.  The alignment is anatomic.  Narrowing of the right hip joint space.  Prominent soft tissue swelling of the distal thigh and knee.    No acute bone or joint abnormality. Soft tissue swelling. DJD. Signed by Deniz Benson MD    XR knee right 4+ views    Result Date: 4/9/2025  Interpreted By:  Ca Viera, STUDY: XR KNEE RIGHT 4+ VIEWS;  4/9/2025 12:45 pm   INDICATION: Signs/Symptoms:right knee pain.     COMPARISON: None.   ACCESSION NUMBER(S): [id number]   ORDERING CLINICIAN: QIAN DELAROSA   FINDINGS: Five views of the right knee obtained.   No acute fracture or osseous displacement. Mild medial compartment narrowing. Minimal lateral and patellofemoral compartment spurring. Prominent anterior/prepatellar soft tissue swelling with possible subtle nodularity. Faint linear presumed vascular calcifications in the  posterior soft tissues       No definite acute osseous finding. Mild degenerative changes. Prominent anterior soft tissue swelling with possible nodularity.   MACRO: None.   Signed by: Ca Viera 4/9/2025 12:56 PM Dictation workstation:   [id number]    CT head wo IV contrast    Result Date: 4/9/2025  Interpreted By:  Adal Landeros, STUDY: CT HEAD WO IV CONTRAST;  4/9/2025 12:42 pm   INDICATION: Signs/Symptoms:fall, hit head.   COMPARISON: None.   ACCESSION NUMBER(S): [id number]   ORDERING CLINICIAN: QIAN DELAROSA   TECHNIQUE: Noncontrast axial CT scan of head was performed. Angled reformats in brain and bone windows were generated. The images were reviewed in bone, brain, blood and soft tissue windows.   FINDINGS: CSF Spaces: The ventricles, sulci and basal cisterns are within normal limits. There is no extraaxial fluid collection.   Parenchyma: Moderate parenchymal atrophy. Periventricular and subcortical white matter hypoattenuation is nonspecific, however likely reflects chronic microvascular ischemic versus chronic hypertensive changes. The grey-white differentiation is intact. There is no mass effect or midline shift.  There is no intracranial hemorrhage. Minimal density along the periphery of the bifrontal regions (series 4, images 20 9-38) is favored to be secondary to a mixture of motion artifact and localized beam hardening artifact   Calvarium: No acute displaced calvarial fracture. Infiltration of the right frontal scalp suggestive of localized soft tissue injury.   Paranasal sinuses and mastoids: Mastoid air cells appear clear. 2.4 x 2.0 x 2.8 cm rounded cystic lesion demonstrating peripheral calcification in the region peripheral to the fossa of Rosenmuller. Presumed cerumen within the bilateral external auditory canals. Mild mucosal thickening of the bilateral ethmoid and right frontal sinus.       No evidence of acute cortical infarct or intracranial hemorrhage.   2.4 x 2.0 x 2.8 cm rounded  cystic lesion containing peripheral calcification in the region peripheral to the fossa of Rosenmuller. Follow-up nonemergent MRI soft tissue neck protocol advised for further assessment.   MACRO: None     Signed by: Adal Landeros 4/9/2025 12:53 PM Dictation workstation:   [id number]            Results for orders placed or performed during the hospital encounter of 04/09/25 (from the past 24 hours)   CBC   Result Value Ref Range    WBC 8.3 4.4 - 11.3 x10*3/uL    nRBC 0.0 0.0 - 0.0 /100 WBCs    RBC 3.14 (L) 4.50 - 5.90 x10*6/uL    Hemoglobin 9.4 (L) 13.5 - 17.5 g/dL    Hematocrit 28.9 (L) 41.0 - 52.0 %    MCV 92 80 - 100 fL    MCH 29.9 26.0 - 34.0 pg    MCHC 32.5 32.0 - 36.0 g/dL    RDW 14.3 11.5 - 14.5 %    Platelets 186 150 - 450 x10*3/uL   Basic metabolic panel   Result Value Ref Range    Glucose 109 (H) 74 - 99 mg/dL    Sodium 141 136 - 145 mmol/L    Potassium 4.2 3.5 - 5.3 mmol/L    Chloride 108 (H) 98 - 107 mmol/L    Bicarbonate 27 21 - 32 mmol/L    Anion Gap 10 10 - 20 mmol/L    Urea Nitrogen 49 (H) 6 - 23 mg/dL    Creatinine 1.95 (H) 0.50 - 1.30 mg/dL    eGFR 38 (L) >60 mL/min/1.73m*2    Calcium 8.1 (L) 8.6 - 10.3 mg/dL   Phosphorus   Result Value Ref Range    Phosphorus 3.3 2.5 - 4.9 mg/dL   Magnesium   Result Value Ref Range    Magnesium 2.11 1.60 - 2.40 mg/dL            Assessment/Plan   Right medial thigh/knee hematoma  Blood loss anemia  THONY on top of CKD, resolved     Patient is postoperative day 3 from hematoma evacuation of the right medial thigh.  Dressing was changed today, fluid-fill,ed bullae overlying hematoma is healing nicely.     Recommendation:  -KATTY drain discontinued today.  -Keep compressed from the toes to mid thigh.    - Elevate leg is much as possible  -Dressing to be changed by vascular surgery daily.  -Pain control  PT/OT, up out of bed.          I spent 35 minutes in the professional and overall care of this patient.      Andrey Egan, APRN-CNP

## 2025-04-14 NOTE — ASSESSMENT & PLAN NOTE
Right thigh hematoma due to fall and is resolved status post right thigh hematoma evacuation 4/10 by vascular-treatment per vascular-today's note noted and today's orthopedic's note noted as well  VSS  Labs as above  Pain/nausea medication  On low-sodium diet  I-S  PT  Out of bed

## 2025-04-14 NOTE — CARE PLAN
Problem: Pain - Adult  Goal: Verbalizes/displays adequate comfort level or baseline comfort level  Outcome: Progressing     Problem: Safety - Adult  Goal: Free from fall injury  Outcome: Progressing     Problem: Discharge Planning  Goal: Discharge to home or other facility with appropriate resources  Outcome: Progressing     Problem: Chronic Conditions and Co-morbidities  Goal: Patient's chronic conditions and co-morbidity symptoms are monitored and maintained or improved  Outcome: Progressing     Problem: Nutrition  Goal: Nutrient intake appropriate for maintaining nutritional needs  Outcome: Progressing     Problem: Skin  Goal: Decreased wound size/increased tissue granulation at next dressing change  Outcome: Progressing  Flowsheets (Taken 4/14/2025 0925)  Decreased wound size/increased tissue granulation at next dressing change:   Promote sleep for wound healing   Protective dressings over bony prominences  Goal: Participates in plan/prevention/treatment measures  Outcome: Progressing  Flowsheets (Taken 4/14/2025 0925)  Participates in plan/prevention/treatment measures:   Discuss with provider PT/OT consult   Elevate heels  Goal: Prevent/manage excess moisture  Outcome: Progressing  Flowsheets (Taken 4/14/2025 0925)  Prevent/manage excess moisture:   Cleanse incontinence/protect with barrier cream   Moisturize dry skin   Follow provider orders for dressing changes   Monitor for/manage infection if present  Goal: Prevent/minimize sheer/friction injuries  Outcome: Progressing  Flowsheets (Taken 4/14/2025 0925)  Prevent/minimize sheer/friction injuries:   Complete micro-shifts as needed if patient unable. Adjust patient position to relieve pressure points, not a full turn   Increase activity/out of bed for meals   HOB 30 degrees or less   Turn/reposition every 2 hours/use positioning/transfer devices  Goal: Promote/optimize nutrition  Outcome: Progressing  Flowsheets (Taken 4/14/2025 0925)  Promote/optimize  nutrition:   Consume > 50% meals/supplements   Offer water/supplements/favorite foods  Goal: Promote skin healing  Outcome: Progressing  Flowsheets (Taken 4/14/2025 9452)  Promote skin healing:   Protective dressings over bony prominences   Turn/reposition every 2 hours/use positioning/transfer devices     Problem: Fall/Injury  Goal: Not fall by end of shift  Outcome: Progressing  Goal: Be free from injury by end of the shift  Outcome: Progressing  Goal: Verbalize understanding of personal risk factors for fall in the hospital  Outcome: Progressing  Goal: Verbalize understanding of risk factor reduction measures to prevent injury from fall in the home  Outcome: Progressing  Goal: Use assistive devices by end of the shift  Outcome: Progressing  Goal: Pace activities to prevent fatigue by end of the shift  Outcome: Progressing     Problem: Pain  Goal: Takes deep breaths with improved pain control throughout the shift  Outcome: Progressing  Goal: Turns in bed with improved pain control throughout the shift  Outcome: Progressing  Goal: Walks with improved pain control throughout the shift  Outcome: Progressing  Goal: Performs ADL's with improved pain control throughout shift  Outcome: Progressing  Goal: Participates in PT with improved pain control throughout the shift  Outcome: Progressing  Goal: Free from opioid side effects throughout the shift  Outcome: Progressing  Goal: Free from acute confusion related to pain meds throughout the shift  Outcome: Progressing     Problem: Respiratory  Goal: No signs of respiratory distress (eg. Use of accessory muscles. Peds grunting)  Outcome: Progressing   The patient's goals for the shift include Patient Safety    The clinical goals for the shift include pt will remain free of falls.

## 2025-04-14 NOTE — PROGRESS NOTES
04/14/25 1630   Discharge Planning   Who is requesting discharge planning? Provider   Home or Post Acute Services Post acute facilities (Rehab/SNF/etc)   Type of Post Acute Facility Services Skilled nursing   Expected Discharge Disposition SNF   Does the patient need discharge transport arranged? Yes   RoundTrip coordination needed? Yes   Has discharge transport been arranged? No     PT/OT skilled patient mod intensity. TCC will see if patient is agreeable to SNF. Will need precert.     DISCHARGE NOT SECURE.

## 2025-04-14 NOTE — PROGRESS NOTES
Colten Eason is a 64 y.o. male on day 5 of admission presenting with Fall, initial encounter.      Subjective  Dressing change at bedside with nursing assistant  Right leg with continued improvement  A little less painful  Palpable pulses in the right foot    Objective        Last Recorded Vitals      4/13/2025    12:34 PM 4/13/2025     4:49 PM 4/13/2025     8:55 PM 4/14/2025    12:36 AM 4/14/2025     4:58 AM 4/14/2025     7:00 AM 4/14/2025    11:33 AM   Vitals   Systolic 136 145 129 142 134 141 123   Diastolic 80 85 68 81 78 79 80   BP Location Left arm Left arm Left arm Left arm Left arm Left arm Left arm   Heart Rate 81 83 54 72 71 68 71   Temp 36.4 °C (97.5 °F) 36.3 °C (97.3 °F) 36.3 °C (97.3 °F) 36.2 °C (97.2 °F) 36 °C (96.8 °F) 37.1 °C (98.8 °F) 36.1 °C (97 °F)   Resp 18 17 17 16 16 16 18   Weight (lb)     291.67     BMI     44.35 kg/m2     BSA (m2)     2.52 m2         Imaging  Imaging  MR knee right wo IV contrast    Result Date: 4/13/2025  1. Findings most compatible with evolving hematoma in the subcutaneous tissues of the anterior to anteromedial proximal leg. 2. Diffuse reticular increased T2 signal intensity in the subcutaneous tissues which may represent lymphedema and/or cellulitis. 3. As seen on this examination of the quadriceps tendon appears to be intact. If there is concern for compromise of the quadriceps more proximally in the thigh, thigh MRI is recommended. 4. Moderate volume knee joint effusion. 5. Mild-to-moderate degenerative change of the knee.   Signed by: Ignacio Agosto 4/13/2025 1:53 PM Dictation workstation:   [id number]     Cardiology, Vascular, and Other Imaging  No other imaging results found for the past 2 days        Relevant Results  Results for orders placed or performed during the hospital encounter of 04/09/25 (from the past 24 hours)   CBC   Result Value Ref Range    WBC 8.6 4.4 - 11.3 x10*3/uL    nRBC 0.0 0.0 - 0.0 /100 WBCs    RBC 3.24 (L) 4.50 - 5.90 x10*6/uL     Hemoglobin 9.8 (L) 13.5 - 17.5 g/dL    Hematocrit 29.7 (L) 41.0 - 52.0 %    MCV 92 80 - 100 fL    MCH 30.2 26.0 - 34.0 pg    MCHC 33.0 32.0 - 36.0 g/dL    RDW 14.2 11.5 - 14.5 %    Platelets 212 150 - 450 x10*3/uL   Basic metabolic panel   Result Value Ref Range    Glucose 112 (H) 74 - 99 mg/dL    Sodium 140 136 - 145 mmol/L    Potassium 4.5 3.5 - 5.3 mmol/L    Chloride 109 (H) 98 - 107 mmol/L    Bicarbonate 25 21 - 32 mmol/L    Anion Gap 11 10 - 20 mmol/L    Urea Nitrogen 55 (H) 6 - 23 mg/dL    Creatinine 1.81 (H) 0.50 - 1.30 mg/dL    eGFR 41 (L) >60 mL/min/1.73m*2    Calcium 8.3 (L) 8.6 - 10.3 mg/dL   Phosphorus   Result Value Ref Range    Phosphorus 3.3 2.5 - 4.9 mg/dL   Magnesium   Result Value Ref Range    Magnesium 2.23 1.60 - 2.40 mg/dL       Physical Exam     General: Pt is alert and oriented x 3. Pleasant, conversive  HEENT: Head is atraumatic, normocephalic.   Cardiac:   Regular rate and rhythm.  No murmurs.  Respiratory: Lungs clear to auscultation.  No adventitious sounds.  Abdomen: Soft, nondistended, nontender.  Bowel sounds x4 quadrants.   morbidly obese  Pulse exam: Palpable brachial and radial pulses bilaterally.   Femoral, popliteal, pedal pulses are palpable bilaterally.  Extremities: 1+ pitting edema right lower extremity.  The right medial thigh/knee hematoma has been decompressed quite significantly.  The fluid-filled bulla to the medial aspect of the thigh has ruptured.  Wound with tissue is mostly granular.   Surgical incision near the knee is well-approximated with sutures in place.  There is some erythema janine-incisional he.  Neuro: Moves all extremities spontaneously.  No focal deficits.  Psych: Appropriate affect.  Answers questions appropriately      Assessment/Plan  Right medial thigh/knee hematoma  Blood loss anemia  THONY on top of CKD, resolved     Patient is postoperative day 4 from hematoma evacuation of the right medial thigh.  Dressing was changed today, fluid-fill,ed bullae  overlying hematoma is healing nicely.      Recommendation:    -Keep compressed from the toes to mid thigh.    - Elevate leg is much as possible  -Dressing to be changed by vascular surgery daily.  -Pain control  PT/OT, up out of bed.    Needs follow-up orders

## 2025-04-14 NOTE — CARE PLAN
The patient's goals for the shift include Patient Safety    The clinical goals for the shift include Patient will remain HDS this shift      Problem: Pain - Adult  Goal: Verbalizes/displays adequate comfort level or baseline comfort level  Outcome: Progressing     Problem: Safety - Adult  Goal: Free from fall injury  Outcome: Progressing     Problem: Discharge Planning  Goal: Discharge to home or other facility with appropriate resources  Outcome: Progressing     Problem: Chronic Conditions and Co-morbidities  Goal: Patient's chronic conditions and co-morbidity symptoms are monitored and maintained or improved  Outcome: Progressing     Problem: Nutrition  Goal: Nutrient intake appropriate for maintaining nutritional needs  Outcome: Progressing     Problem: Skin  Goal: Decreased wound size/increased tissue granulation at next dressing change  Outcome: Progressing  Goal: Participates in plan/prevention/treatment measures  Outcome: Progressing  Goal: Prevent/manage excess moisture  Outcome: Progressing  Goal: Prevent/minimize sheer/friction injuries  Outcome: Progressing  Goal: Promote/optimize nutrition  Outcome: Progressing  Goal: Promote skin healing  Outcome: Progressing     Problem: Fall/Injury  Goal: Not fall by end of shift  Outcome: Progressing  Goal: Be free from injury by end of the shift  Outcome: Progressing  Goal: Verbalize understanding of personal risk factors for fall in the hospital  Outcome: Progressing  Goal: Verbalize understanding of risk factor reduction measures to prevent injury from fall in the home  Outcome: Progressing  Goal: Use assistive devices by end of the shift  Outcome: Progressing  Goal: Pace activities to prevent fatigue by end of the shift  Outcome: Progressing     Problem: Pain  Goal: Takes deep breaths with improved pain control throughout the shift  Outcome: Progressing  Goal: Turns in bed with improved pain control throughout the shift  Outcome: Progressing  Goal: Walks with  improved pain control throughout the shift  Outcome: Progressing  Goal: Performs ADL's with improved pain control throughout shift  Outcome: Progressing  Goal: Participates in PT with improved pain control throughout the shift  Outcome: Progressing  Goal: Free from opioid side effects throughout the shift  Outcome: Progressing  Goal: Free from acute confusion related to pain meds throughout the shift  Outcome: Progressing     Problem: Respiratory  Goal: No signs of respiratory distress (eg. Use of accessory muscles. Peds grunting)  Outcome: Progressing

## 2025-04-14 NOTE — PROGRESS NOTES
Colten Eason is a 64 y.o. male on day 5 of admission presenting with Fall, initial encounter.    Subjective   Feels all right and better than yesterday.  Postop pain is okay.  Taking his regular diet okay without any issues.  No nausea or vomiting.  Passing gas and moving his bowels.  No abdominal pain, angina, shortness of breath than his chronic COPD baseline, calf pain outside of his chronic-patient states that he chronically has bilateral calf pain, bleeding, voiding problems.  Has not gotten out of bed yet today stating because ortho has not gotten back to him yet.     Objective     Vital signs in last 24 hours:  Temp:  [36 °C (96.8 °F)-37.1 °C (98.8 °F)] 36.1 °C (97 °F)  Heart Rate:  [54-83] 71  Resp:  [16-18] 18  BP: (123-145)/(68-85) 123/80  Heart Rate:  [54-83]   Temp:  [36 °C (96.8 °F)-37.1 °C (98.8 °F)]   Resp:  [16-18]   BP: (123-145)/(68-85)   Weight:  [132 kg (291 lb 10.7 oz)]   SpO2:  [91 %-96 %]      Intake/Output last 3 Shifts:  I/O last 3 completed shifts:  In: - (0 mL/kg)   Out: 5935 (44.9 mL/kg) [Urine:5900 (1.2 mL/kg/hr); Drains:35]  Weight: 132.3 kg     Physical Exam  No acute distress  Not septic appearing  Looks fine and comfortable  Alert and oriented  Heart regular  Lungs clear on room air  Right lower extremity dressing dry and intact-patient states was already changed today-not taken down-will defer to vascular      Scheduled medications  aspirin, 81 mg, oral, Daily  carvedilol, 25 mg, oral, BID  tiotropium, 2 puff, inhalation, Daily   And  fluticasone furoate-vilanteroL, 1 puff, inhalation, Daily  furosemide, 40 mg, oral, Daily  polyethylene glycol, 17 g, oral, Daily    Continuous medications     PRN medications  PRN medications: acetaminophen **OR** acetaminophen **OR** acetaminophen, albuterol, HYDROmorphone, ondansetron ODT **OR** ondansetron, oxyCODONE    Relevant Results  Results from last 7 days   Lab Units 04/14/25  0515 04/13/25  0516 04/12/25  0500   WBC AUTO x10*3/uL 8.6 8.3  7.7   HEMOGLOBIN g/dL 9.8* 9.4* 8.9*   HEMATOCRIT % 29.7* 28.9* 27.2*   PLATELETS AUTO x10*3/uL 212 186 158      Results from last 7 days   Lab Units 04/14/25  0515 04/13/25  0516 04/12/25  0500   SODIUM mmol/L 140 141 143   POTASSIUM mmol/L 4.5 4.2 4.1   CHLORIDE mmol/L 109* 108* 110*   CO2 mmol/L 25 27 26   BUN mg/dL 55* 49* 48*   CREATININE mg/dL 1.81* 1.95* 2.35*   GLUCOSE mg/dL 112* 109* 104*   CALCIUM mg/dL 8.3* 8.1* 7.7*       No results found for the last 7 days.    Imaging  MR knee right wo IV contrast    Result Date: 4/13/2025  1. Findings most compatible with evolving hematoma in the subcutaneous tissues of the anterior to anteromedial proximal leg. 2. Diffuse reticular increased T2 signal intensity in the subcutaneous tissues which may represent lymphedema and/or cellulitis. 3. As seen on this examination of the quadriceps tendon appears to be intact. If there is concern for compromise of the quadriceps more proximally in the thigh, thigh MRI is recommended. 4. Moderate volume knee joint effusion. 5. Mild-to-moderate degenerative change of the knee.   Signed by: Ignacio Agosto 4/13/2025 1:53 PM Dictation workstation:   [id number]     Cardiology, Vascular, and Other Imaging  No other imaging results found for the past 2 days       Assessment/Plan   Assessment & Plan  Fall, initial encounter  See rest of note    Hematoma  Right thigh hematoma due to fall and is resolved status post right thigh hematoma evacuation 4/10 by vascular-treatment per vascular-today's note noted and today's orthopedic's note noted as well  VSS  Labs as above  Pain/nausea medication  On low-sodium diet  I-S  PT  Out of bed    COPD (chronic obstructive pulmonary disease) (Multi)  Stable  Continue with Spiriva    Encounter for deep vein thrombosis (DVT) prophylaxis  Will defer to vascular    CKD (chronic kidney disease)  Observe      Danae Alfaor, APRN-CNP

## 2025-04-14 NOTE — PROGRESS NOTES
MRI Reviewed.  No significant knee joint pathology.  Difficulty extending likely to soft tissue contusion.  May be up WBAT on the RLE.  Continue with PT.  Can followup as needed in 1mo if still having difficulties.      Munir Spencer MD

## 2025-04-15 LAB
ANION GAP SERPL CALCULATED.3IONS-SCNC: 12 MMOL/L (ref 10–20)
BLOOD EXPIRATION DATE: NORMAL
BLOOD EXPIRATION DATE: NORMAL
BUN SERPL-MCNC: 61 MG/DL (ref 6–23)
CALCIUM SERPL-MCNC: 8.8 MG/DL (ref 8.6–10.3)
CHLORIDE SERPL-SCNC: 110 MMOL/L (ref 98–107)
CO2 SERPL-SCNC: 23 MMOL/L (ref 21–32)
CREAT SERPL-MCNC: 2.1 MG/DL (ref 0.5–1.3)
DISPENSE STATUS: NORMAL
DISPENSE STATUS: NORMAL
EGFRCR SERPLBLD CKD-EPI 2021: 35 ML/MIN/1.73M*2
ERYTHROCYTE [DISTWIDTH] IN BLOOD BY AUTOMATED COUNT: 14 % (ref 11.5–14.5)
GLUCOSE SERPL-MCNC: 112 MG/DL (ref 74–99)
HCT VFR BLD AUTO: 30.2 % (ref 41–52)
HGB BLD-MCNC: 9.7 G/DL (ref 13.5–17.5)
MAGNESIUM SERPL-MCNC: 2.13 MG/DL (ref 1.6–2.4)
MCH RBC QN AUTO: 29.8 PG (ref 26–34)
MCHC RBC AUTO-ENTMCNC: 32.1 G/DL (ref 32–36)
MCV RBC AUTO: 93 FL (ref 80–100)
NRBC BLD-RTO: 0 /100 WBCS (ref 0–0)
PHOSPHATE SERPL-MCNC: 3.7 MG/DL (ref 2.5–4.9)
PLATELET # BLD AUTO: 225 X10*3/UL (ref 150–450)
POTASSIUM SERPL-SCNC: 4.6 MMOL/L (ref 3.5–5.3)
PRODUCT BLOOD TYPE: 5100
PRODUCT BLOOD TYPE: 5100
PRODUCT CODE: NORMAL
PRODUCT CODE: NORMAL
RBC # BLD AUTO: 3.26 X10*6/UL (ref 4.5–5.9)
SODIUM SERPL-SCNC: 140 MMOL/L (ref 136–145)
UNIT ABO: NORMAL
UNIT ABO: NORMAL
UNIT NUMBER: NORMAL
UNIT NUMBER: NORMAL
UNIT RH: NORMAL
UNIT RH: NORMAL
UNIT VOLUME: 350
UNIT VOLUME: 350
WBC # BLD AUTO: 9.1 X10*3/UL (ref 4.4–11.3)
XM INTEP: NORMAL
XM INTEP: NORMAL

## 2025-04-15 PROCEDURE — 99024 POSTOP FOLLOW-UP VISIT: CPT | Performed by: NURSE PRACTITIONER

## 2025-04-15 PROCEDURE — 80048 BASIC METABOLIC PNL TOTAL CA: CPT | Performed by: PHYSICIAN ASSISTANT

## 2025-04-15 PROCEDURE — 2500000001 HC RX 250 WO HCPCS SELF ADMINISTERED DRUGS (ALT 637 FOR MEDICARE OP): Performed by: NURSE PRACTITIONER

## 2025-04-15 PROCEDURE — 2060000001 HC INTERMEDIATE ICU ROOM DAILY

## 2025-04-15 PROCEDURE — 97530 THERAPEUTIC ACTIVITIES: CPT | Mod: GP | Performed by: PHYSICAL THERAPIST

## 2025-04-15 PROCEDURE — 2500000004 HC RX 250 GENERAL PHARMACY W/ HCPCS (ALT 636 FOR OP/ED): Performed by: SURGERY

## 2025-04-15 PROCEDURE — 84100 ASSAY OF PHOSPHORUS: CPT | Performed by: PHYSICIAN ASSISTANT

## 2025-04-15 PROCEDURE — 99222 1ST HOSP IP/OBS MODERATE 55: CPT | Performed by: STUDENT IN AN ORGANIZED HEALTH CARE EDUCATION/TRAINING PROGRAM

## 2025-04-15 PROCEDURE — 85027 COMPLETE CBC AUTOMATED: CPT | Performed by: PHYSICIAN ASSISTANT

## 2025-04-15 PROCEDURE — 94640 AIRWAY INHALATION TREATMENT: CPT

## 2025-04-15 PROCEDURE — 83735 ASSAY OF MAGNESIUM: CPT | Performed by: PHYSICIAN ASSISTANT

## 2025-04-15 PROCEDURE — 36415 COLL VENOUS BLD VENIPUNCTURE: CPT | Performed by: PHYSICIAN ASSISTANT

## 2025-04-15 PROCEDURE — 2500000004 HC RX 250 GENERAL PHARMACY W/ HCPCS (ALT 636 FOR OP/ED): Performed by: NURSE PRACTITIONER

## 2025-04-15 RX ADMIN — TIOTROPIUM BROMIDE INHALATION SPRAY 2 PUFF: 3.12 SPRAY, METERED RESPIRATORY (INHALATION) at 08:27

## 2025-04-15 RX ADMIN — OXYCODONE HYDROCHLORIDE 5 MG: 5 TABLET ORAL at 23:26

## 2025-04-15 RX ADMIN — HEPARIN SODIUM 5000 UNITS: 5000 INJECTION INTRAVENOUS; SUBCUTANEOUS at 13:09

## 2025-04-15 RX ADMIN — HEPARIN SODIUM 5000 UNITS: 5000 INJECTION INTRAVENOUS; SUBCUTANEOUS at 21:08

## 2025-04-15 RX ADMIN — CARVEDILOL 25 MG: 25 TABLET, FILM COATED ORAL at 08:02

## 2025-04-15 RX ADMIN — OXYCODONE HYDROCHLORIDE 5 MG: 5 TABLET ORAL at 13:10

## 2025-04-15 RX ADMIN — OXYCODONE HYDROCHLORIDE 5 MG: 5 TABLET ORAL at 08:02

## 2025-04-15 RX ADMIN — CARVEDILOL 25 MG: 25 TABLET, FILM COATED ORAL at 16:02

## 2025-04-15 RX ADMIN — POLYETHYLENE GLYCOL 3350 17 G: 17 POWDER, FOR SOLUTION ORAL at 08:01

## 2025-04-15 RX ADMIN — FUROSEMIDE 40 MG: 40 TABLET ORAL at 08:02

## 2025-04-15 RX ADMIN — HYDROMORPHONE HYDROCHLORIDE 0.2 MG: 1 INJECTION, SOLUTION INTRAMUSCULAR; INTRAVENOUS; SUBCUTANEOUS at 16:03

## 2025-04-15 RX ADMIN — FLUTICASONE FUROATE AND VILANTEROL TRIFENATATE 1 PUFF: 100; 25 POWDER RESPIRATORY (INHALATION) at 08:28

## 2025-04-15 RX ADMIN — HEPARIN SODIUM 5000 UNITS: 5000 INJECTION INTRAVENOUS; SUBCUTANEOUS at 04:50

## 2025-04-15 RX ADMIN — ASPIRIN 81 MG: 81 TABLET, COATED ORAL at 08:02

## 2025-04-15 ASSESSMENT — COGNITIVE AND FUNCTIONAL STATUS - GENERAL
DRESSING REGULAR UPPER BODY CLOTHING: A LITTLE
DRESSING REGULAR LOWER BODY CLOTHING: A LOT
HELP NEEDED FOR BATHING: A LITTLE
TURNING FROM BACK TO SIDE WHILE IN FLAT BAD: A LITTLE
TOILETING: A LITTLE
MOVING TO AND FROM BED TO CHAIR: A LOT
STANDING UP FROM CHAIR USING ARMS: A LOT
DAILY ACTIVITIY SCORE: 18
PERSONAL GROOMING: A LITTLE
MOBILITY SCORE: 14
STANDING UP FROM CHAIR USING ARMS: A LITTLE
MOVING TO AND FROM BED TO CHAIR: A LITTLE
MOBILITY SCORE: 15
WALKING IN HOSPITAL ROOM: A LOT
MOVING FROM LYING ON BACK TO SITTING ON SIDE OF FLAT BED WITH BEDRAILS: A LOT
WALKING IN HOSPITAL ROOM: A LITTLE
CLIMB 3 TO 5 STEPS WITH RAILING: TOTAL
TURNING FROM BACK TO SIDE WHILE IN FLAT BAD: A LOT
CLIMB 3 TO 5 STEPS WITH RAILING: A LOT

## 2025-04-15 ASSESSMENT — PAIN SCALES - GENERAL
PAINLEVEL_OUTOF10: 0 - NO PAIN
PAINLEVEL_OUTOF10: 4
PAINLEVEL_OUTOF10: 6
PAINLEVEL_OUTOF10: 6
PAINLEVEL_OUTOF10: 9

## 2025-04-15 ASSESSMENT — PAIN DESCRIPTION - DESCRIPTORS
DESCRIPTORS: ACHING;SORE
DESCRIPTORS: ACHING;SORE

## 2025-04-15 ASSESSMENT — PAIN DESCRIPTION - LOCATION
LOCATION: KNEE
LOCATION: LEG

## 2025-04-15 ASSESSMENT — PAIN - FUNCTIONAL ASSESSMENT
PAIN_FUNCTIONAL_ASSESSMENT: 0-10

## 2025-04-15 ASSESSMENT — PAIN DESCRIPTION - ORIENTATION
ORIENTATION: RIGHT
ORIENTATION: RIGHT

## 2025-04-15 NOTE — CARE PLAN
The patient's goals for the shift include Patient Safety    The clinical goals for the shift include Pt remains free of injury. Pt will regain her prior LOC.    Over the shift, the patient did not make progress toward the following goals. Barriers to progression include LOC varied during the course of the shift. Recommendations to address these barriers include Wait for periods of lucidity for discussions of care and and care plan.    4/15/2025 Pt has refused to turn throughout the shift. I re-educated him on the value of turning and moving in bed.

## 2025-04-15 NOTE — H&P
History Of Present Illness  Colten Eason is a 64 y.o. male hx CKD, COPD (no home O2), not on any blood thinners aside from ASA 81mg, HTN, who had a ground level fall onto R knee today around 1000AM and subsequently developed significant swelling and bruising to the R knee, for which he presented to the ED for. Vascular surgery engaged due to radiologic finding of small active extravasation on CTA RLE obtained. Patient had a surigcal procedure for the hematoma. Patient is postoperative day 4 from hematoma evacuation of the right medial thigh. Dressing was changed today, fluid-fill,ed bullae overlying hematoma is healing nicely. Surgery asked the patient to take over the care for medical management.    During my interaction, patient denied any complains. Mentioned he is requiring oxycodone for pain which gives him the relief.      Past Medical History  He has no past medical history on file.    Surgical History  He has a past surgical history that includes CT angio coronary art with heartflow if score >30% (7/27/2020).     Social History  He reports that he has never smoked. He has never used smokeless tobacco. No history on file for alcohol use and drug use.    Family History  No family history on file.     Allergies  Patient has no known allergies.    ROS is negative  Physical Exam   General: Pt is alert and oriented x 3. Pleasant, conversive  HEENT: Head is atraumatic, normocephalic.   Cardiac:   Regular rate and rhythm.  No murmurs.  Respiratory: Lungs clear to auscultation.  No adventitious sounds.  Abdomen: Soft, nondistended, nontender.  Bowel sounds x4 quadrants.   morbidly obese  Pulse exam: Palpable brachial and radial pulses bilaterally.   Femoral, popliteal, pedal pulses are palpable bilaterally.  Extremities: 1+ pitting edema right lower extremity.  The right medial thigh/knee hematoma has been decompressed quite significantly.  The fluid-filled bulla to the medial aspect of the thigh has ruptured.   Wound with tissue is mostly granular.   Surgical incision near the knee is well-approximated with sutures in place.  There is some erythema janine-incisional he.  Neuro: Moves all extremities spontaneously.  No focal deficits.  Psych: Appropriate affect.  Answers questions appropriately  Last Recorded Vitals  /74 (BP Location: Left arm, Patient Position: Lying)   Pulse 77   Temp 36.8 °C (98.2 °F) (Oral)   Resp 17   Wt 132 kg (290 lb 2 oz)   SpO2 96%     Relevant Results             Assessment/Plan   Assessment & Plan  Fall, initial encounter    COPD (chronic obstructive pulmonary disease) (Multi)    Encounter for deep vein thrombosis (DVT) prophylaxis    CKD (chronic kidney disease)    Hematoma    Hematoma  Right thigh hematoma due to fall and is resolved status post right thigh hematoma evacuation 4/10 by vascular-treatment per vascular-today's note noted and today's orthopedic's note noted as well  VSS  Labs as above  Pain/nausea medication  On low-sodium diet  I-S  PT  Out of bed     COPD (chronic obstructive pulmonary disease) (Multi)  Stable  Continue with Spiriva     Encounter for deep vein thrombosis (DVT) prophylaxis  Heparin     CKD (chronic kidney disease)  Observe     GENESIS  CPAP    PT: Rehab      Ani Jansen MD

## 2025-04-15 NOTE — PROGRESS NOTES
"Colten Eason is a 64 y.o. male on day 6 of admission presenting with Fall, initial encounter.    Subjective   Patient seen and examined.  He is postoperative day #4 from a right knee/thigh hematoma evacuation.  Patient continues to improve.  Less pain today.  Patient reports working with physical therapy.  Stated he was a little \"shaky\".       Objective     Physical Exam    General: Pt is alert and oriented x 3. Pleasant, conversive  HEENT: Head is atraumatic, normocephalic.   Cardiac:   Regular rate and rhythm.  No murmurs.  Respiratory: Lungs clear to auscultation.  No adventitious sounds.  Abdomen: Soft, nondistended, nontender.  Bowel sounds x4 quadrants.   morbidly obese  Pulse exam: Palpable brachial and radial pulses bilaterally.   Femoral, popliteal, pedal pulses are palpable bilaterally.  Extremities: 1+ pitting edema right lower extremity.  The right medial thigh/knee hematoma has been decompressed quite significantly.  The fluid-filled bulla to the medial aspect of the thigh has ruptured.  Wound with tissue is mostly granular.   Surgical incision near the knee is well-approximated with sutures in place.  There is some erythema janine-incisional he.  Neuro: Moves all extremities spontaneously.  No focal deficits.  Psych: Appropriate affect.  Answers questions appropriately        Last Recorded Vitals  Blood pressure 121/74, pulse 77, temperature 36.8 °C (98.2 °F), temperature source Oral, resp. rate 17, height 1.727 m (5' 8\"), weight 132 kg (290 lb 2 oz), SpO2 96%.  Intake/Output last 3 Shifts:  I/O last 3 completed shifts:  In: 80 (0.6 mL/kg) [P.O.:80]  Out: 5250 (39.9 mL/kg) [Urine:5250 (1.1 mL/kg/hr)]  Weight: 131.6 kg     Relevant Results  MR knee right wo IV contrast    Result Date: 4/13/2025  Interpreted By:  Ignacio Agosto, STUDY: MRI of the right knee without contrast dated 4/13/2025.   INDICATION: Signs/Symptoms:R quadricep tendon injury   COMPARISON: None.   ACCESSION NUMBER(S): [id number]   " ORDERING CLINICIAN: HORTENCIA LEBRON   TECHNIQUE: Multiplanar multisequence MRI of the right knee was performed without intravenous contrast.   FINDINGS: MUSCLES, TENDONS, AND LIGAMENTS:   The anterior cruciate ligament is intact.  The posterior cruciate ligament is intact. The medial collateral ligament is intact. The lateral collateral ligament complex is intact. The popliteus and biceps femoris tendons, iliotibial band, and extensor mechanism are intact.   MENISCI:   The medial meniscus is intact.  The lateral meniscus is intact.   OSSEOUS STRUCTURES AND JOINTS:   No fracture or dislocation is evident. There is marginal femorotibial and patellofemoral osteophyte formation. As seen on this exam of the hyaline articular cartilage in the femorotibial joint spaces is intact. There is full-thickness fissuring/loss of hyaline articular cartilage of the patellar apex with some foci of mild-to-moderate partial-thickness fissuring of the hyaline articular cartilage of the medial and lateral facets of the patella. There is a moderate volume knee joint effusion.   SOFT TISSUES:   No significant volume of fluid is evident in a popliteal cyst. Extensive reticular increased T2 signal intensity is seen in the subcutaneous tissues. In the subcutaneous tissues of the anterior to anteromedial proximal leg there is a fusiform heterogeneous collection measuring up to approximately 1.5 x 9.3 x 5.8 cm. It is predominantly hypointense on T2 weighted imaging with regions of some higher signal intensity, and heterogeneous with regions of hyperintensity and hypointensity on T1 weighted imaging.       1. Findings most compatible with evolving hematoma in the subcutaneous tissues of the anterior to anteromedial proximal leg. 2. Diffuse reticular increased T2 signal intensity in the subcutaneous tissues which may represent lymphedema and/or cellulitis. 3. As seen on this examination of the quadriceps tendon appears to be intact. If there  is concern for compromise of the quadriceps more proximally in the thigh, thigh MRI is recommended. 4. Moderate volume knee joint effusion. 5. Mild-to-moderate degenerative change of the knee.   Signed by: Ignacio Agosto 4/13/2025 1:53 PM Dictation workstation:   [id number]    CT angio lower extremity right w and or wo IV contrast    Result Date: 4/9/2025  STUDY: CT CTA LOWER EXTREMITY RIGHT; 04/09/2025 06:01 PM INDICATION:  Right lower extremity hematoma, evaluate for vessel dissection. COMPARISON: CT Knee 04/09/2025; XR Femur 04/09/2025. ACCESSION NUMBER(S): [id number] ORDERING CLINICIAN: TECHNIQUE:  Helical CT is performed from the infrarenal aorta through the feet after bolus administration of 120 mL of Omnipaque 350. Images are reviewed and processed at a workstation according to the CT angiogram protocol with 3-D and/or MIP post processing imaging generated.  2236 DICOM images received. Automated mA/kV exposure control was utilized and patient examination was performed in strict accordance with principles of ALARA. FINDINGS: No stenoses are seen within the visualized portions of the right superficial femoral artery, or within the right popliteal artery.  No intimal flap is visualized to indicate a dissection.  There are small contrast blush is seen in the hematomas in the medial aspect of the distal right thigh, and anterior medial aspect of the right knee.  It is indeterminate whether or not there are branches from the profunda femoral artery or the geniculate branches from the popliteal artery are contributing to these hematomas.    1.  Contrast blushing is in the two hematomas located in the medial aspect of the distal right thigh and anteromedial aspect of the right knee, suggestive of an arterial bleed.  It is indeterminate whether or not these are contributed by branches of the profunda femoral artery, or the geniculate arteries. 2.  No stenoses or dissections within the visualized portions of the  right superficial femoral artery, or right popliteal artery. This report was called to Dr. Ricardo Cook at 7:40 PM on April 9, 2025. Signed by Nik Hart MD    XR hand right 3+ views    Result Date: 4/9/2025  Interpreted By:  Ludwig Briggs, STUDY: XR HAND RIGHT 3+ VIEWS   INDICATION: Signs/Symptoms:right hand pain.   COMPARISON: None   ACCESSION NUMBER(S): [id number]   ORDERING CLINICIAN: QIAN DELAROSA   FINDINGS: Mild osteoarthritis right hand more advanced at the 1st [facility]. No evidence of fracture or malalignment       Osteoarthritis right hand most prominent at the 1st [facility].   Signed by: Ludwig Briggs 4/9/2025 5:28 PM Dictation workstation:   [id number]    CT knee right wo IV contrast    Result Date: 4/9/2025  STUDY: CT Extremity; Completed Time:  4/9/2025 3:12 PM INDICATION: Evaluate for soft tissue hematoma. COMPARISON: XR RT femur 4/9/2025. ACCESSION NUMBER(S): [id number] ORDERING CLINICIAN: QIAN DELAROSA TECHNIQUE:  Thin section axial images were obtained through the right knee without intravenous contrast.  Orthogonal reconstructed images were obtained and reviewed.  Automated mA/kV exposure control was utilized and patient examination was performed in strict accordance with principles of ALARA. FINDINGS: No acute fractures or dislocations are visualized within the right knee.  There is no evidence of patellar subluxation.  No suprapatellar effusion is appreciated.  There is prominent soft tissue swelling over the anterior aspect of the knee.  There is a hyperdense fluid collection, indicative of a hematoma.  It is estimated at 14.5 x 9.9 x 5.2 cm.  There is impingement noted upon the vastus medialis of the quadriceps muscle.  However, this does not appear to involve the musculature.  There is a second hematoma over the medial aspect the distal thigh, measured at 11.7 x 8.6 x 2.7 cm.  There is generalized subcutaneous edema over the knee.    1.  Large hematoma over the anteromedial aspect of the right knee.  2.  Additional hematoma over the medial aspect of the distal right thigh. 3.  No acute osseous findings involving the right knee. 4.  No prominent hematoma appreciated within the surrounding musculature.  5.  Subcutaneous edema overlying the right knee. Signed by Nik Hart MD    XR femur right 2+ views    Result Date: 4/9/2025  STUDY: Femur Radiographs; 4/9/2025 12:50 PM INDICATION: Right thigh pain. COMPARISON: None Available. ACCESSION NUMBER(S): [id number] ORDERING CLINICIAN: QIAN DELAROSA TECHNIQUE:  Two view(s) of the right femur (five images). FINDINGS:  There is no displaced fracture.  The alignment is anatomic.  Narrowing of the right hip joint space.  Prominent soft tissue swelling of the distal thigh and knee.    No acute bone or joint abnormality. Soft tissue swelling. DJD. Signed by Deniz Benosn MD    XR knee right 4+ views    Result Date: 4/9/2025  Interpreted By:  Ca Viera, STUDY: XR KNEE RIGHT 4+ VIEWS;  4/9/2025 12:45 pm   INDICATION: Signs/Symptoms:right knee pain.     COMPARISON: None.   ACCESSION NUMBER(S): [id number]   ORDERING CLINICIAN: QIAN DELAROSA   FINDINGS: Five views of the right knee obtained.   No acute fracture or osseous displacement. Mild medial compartment narrowing. Minimal lateral and patellofemoral compartment spurring. Prominent anterior/prepatellar soft tissue swelling with possible subtle nodularity. Faint linear presumed vascular calcifications in the posterior soft tissues       No definite acute osseous finding. Mild degenerative changes. Prominent anterior soft tissue swelling with possible nodularity.   MACRO: None.   Signed by: Ca Viera 4/9/2025 12:56 PM Dictation workstation:   [id number]    CT head wo IV contrast    Result Date: 4/9/2025  Interpreted By:  Adal Landeros, STUDY: CT HEAD WO IV CONTRAST;  4/9/2025 12:42 pm   INDICATION: Signs/Symptoms:fall, hit head.   COMPARISON: None.   ACCESSION NUMBER(S): [id number]   ORDERING CLINICIAN: QIAN  HOYING   TECHNIQUE: Noncontrast axial CT scan of head was performed. Angled reformats in brain and bone windows were generated. The images were reviewed in bone, brain, blood and soft tissue windows.   FINDINGS: CSF Spaces: The ventricles, sulci and basal cisterns are within normal limits. There is no extraaxial fluid collection.   Parenchyma: Moderate parenchymal atrophy. Periventricular and subcortical white matter hypoattenuation is nonspecific, however likely reflects chronic microvascular ischemic versus chronic hypertensive changes. The grey-white differentiation is intact. There is no mass effect or midline shift.  There is no intracranial hemorrhage. Minimal density along the periphery of the bifrontal regions (series 4, images 20 9-38) is favored to be secondary to a mixture of motion artifact and localized beam hardening artifact   Calvarium: No acute displaced calvarial fracture. Infiltration of the right frontal scalp suggestive of localized soft tissue injury.   Paranasal sinuses and mastoids: Mastoid air cells appear clear. 2.4 x 2.0 x 2.8 cm rounded cystic lesion demonstrating peripheral calcification in the region peripheral to the fossa of Rosenmuller. Presumed cerumen within the bilateral external auditory canals. Mild mucosal thickening of the bilateral ethmoid and right frontal sinus.       No evidence of acute cortical infarct or intracranial hemorrhage.   2.4 x 2.0 x 2.8 cm rounded cystic lesion containing peripheral calcification in the region peripheral to the fossa of Rosenmuller. Follow-up nonemergent MRI soft tissue neck protocol advised for further assessment.   MACRO: None     Signed by: Adal Landeros 4/9/2025 12:53 PM Dictation workstation:   [id number]      Results for orders placed or performed during the hospital encounter of 04/09/25 (from the past 24 hours)   CBC   Result Value Ref Range    WBC 9.1 4.4 - 11.3 x10*3/uL    nRBC 0.0 0.0 - 0.0 /100 WBCs    RBC 3.26 (L) 4.50 - 5.90  x10*6/uL    Hemoglobin 9.7 (L) 13.5 - 17.5 g/dL    Hematocrit 30.2 (L) 41.0 - 52.0 %    MCV 93 80 - 100 fL    MCH 29.8 26.0 - 34.0 pg    MCHC 32.1 32.0 - 36.0 g/dL    RDW 14.0 11.5 - 14.5 %    Platelets 225 150 - 450 x10*3/uL   Basic metabolic panel   Result Value Ref Range    Glucose 112 (H) 74 - 99 mg/dL    Sodium 140 136 - 145 mmol/L    Potassium 4.6 3.5 - 5.3 mmol/L    Chloride 110 (H) 98 - 107 mmol/L    Bicarbonate 23 21 - 32 mmol/L    Anion Gap 12 10 - 20 mmol/L    Urea Nitrogen 61 (H) 6 - 23 mg/dL    Creatinine 2.10 (H) 0.50 - 1.30 mg/dL    eGFR 35 (L) >60 mL/min/1.73m*2    Calcium 8.8 8.6 - 10.3 mg/dL   Phosphorus   Result Value Ref Range    Phosphorus 3.7 2.5 - 4.9 mg/dL   Magnesium   Result Value Ref Range    Magnesium 2.13 1.60 - 2.40 mg/dL            Vitals:    04/15/25 1100   BP: 121/74   Pulse: 77   Resp:    Temp: 36.8 °C (98.2 °F)   SpO2: 96%                     Assessment/Plan   Right medial thigh/knee hematoma  Blood loss anemia  THONY on top of CKD, resolved     Patient is postoperative day 4 from hematoma evacuation of the right medial thigh.  Dressing was changed today, fluid-fill,ed bullae overlying hematoma is healing nicely.      Recommendation:     -Keep compressed from the toes to mid thigh.    -Follow-up at the [facility] wound care center on Monday, April 21, 2025 with me.  - Elevate leg is much as possible  -Dressing to be changed by vascular surgery daily.  -Pain control  -PT/OT, up out of bed.     Okay to discharge from a vascular perspective.          I spent 35  minutes in the professional and overall care of this patient.      Andrey Egan, APRN-CNP

## 2025-04-15 NOTE — PROGRESS NOTES
Physical Therapy    Physical Therapy Treatment    Patient Name: Colten Eason  MRN: [id number]  Department: [address]  Room: 08/08-A  Today's Date: 4/15/2025  Time Calculation  Start Time: 1428  Stop Time: 1446  Time Calculation (min): 18 min         Assessment/Plan   PT Assessment  Barriers to Discharge Home: Caregiver assistance, Physical needs  Caregiver Assistance: Caregiver assistance needed per identified barriers - however, level of patient's required assistance exceeds assistance available at home  Physical Needs: Stair navigation into home limited by function/safety, Intermittent mobility assistance needed, Intermittent ADL assistance needed, High falls risk due to function or environment  End of Session Communication: Bedside nurse  Assessment Comment: Pt made adequate progress toward his functional mobility goals. Pt required minimally increased assist during functional mobility compared to his reported baseline of indep. Pt would benefit from continued skilled PT services for maximizing independence and safety prior to & after discharge (MODERATE intensity).  End of Session Patient Position: Bed, 3 rail up, Alarm off, not on at start of session (call light and needs within reach)     PT Plan  Treatment/Interventions: Bed mobility, Transfer training, Gait training, Stair training, Balance training, Strengthening, Endurance training, Range of motion, Therapeutic exercise, Therapeutic activity  PT Plan: Ongoing PT  PT Frequency: 5 times per week  PT Discharge Recommendations: Moderate intensity level of continued care  Equipment Recommended upon Discharge: Wheeled walker  PT Recommended Transfer Status: Assist x1, Assistive device  PT - OK to Discharge: Yes      General Visit Information:   PT  Visit  PT Received On: 04/15/25  General  Reason for Referral: Impaired functional mobility due to decreased R knee AROM. Pt admitted after fall with a  R thigh hematoma. He was now s/p R thigh hematoma evacuation on  4/10/25.  Family/Caregiver Present: No  Prior to Session Communication: Bedside nurse  Patient Position Received: Bed, 4 rail up, Alarm off, not on at start of session  General Comment: Cleared by RN for participation. Pt agreed to tx and was fully engaged throughout.    Subjective   Precautions:  Precautions  LE Weight Bearing Status: Weight Bearing as Tolerated (RLE)  Medical Precautions: Fall precautions    Objective   Pain:  Pain Assessment  0-10 (Numeric) Pain Score: 9 (with movement and weight bearing)  Pain Type: Acute pain  Pain Location: Leg  Pain Orientation: Right  Pain Interventions: Ambulation/increased activity  Response to Interventions: Resting quietly    Cognition:  Cognition  Overall Cognitive Status: Within Functional Limits     Postural Control:  Static Sitting Balance  Static Sitting-Balance Support: Feet supported  Static Sitting-Level of Assistance: Close supervision  Static Standing Balance  Static Standing-Balance Support: Bilateral upper extremity supported  Static Standing-Level of Assistance: Close supervision  Dynamic Standing Balance  Dynamic Standing-Balance Support: Bilateral upper extremity supported  Dynamic Standing-Level of Assistance: Contact guard     Activity Tolerance:  Activity Tolerance  Endurance: Tolerates 10 - 20 min exercise with multiple rests    Treatments:  Bed Mobility  Bed Mobility: Yes  Bed Mobility 1  Bed Mobility 1: Supine to sitting  Level of Assistance 1: Minimum assistance (assist to RLE; HOB elevated 40° and used L bed rail)  Bed Mobility 2  Bed Mobility  2: Sitting to supine  Level of Assistance 2: Minimum assistance (to elevate RLE)    Ambulation/Gait Training  Ambulation/Gait Training Performed: Yes  Ambulation/Gait Training 1  Surface 1: Level tile  Device 1: Rolling walker  Assistance 1: Contact guard  Quality of Gait 1: Wide base of support, Diminished heel strike, Decreased step length, Forward flexed posture (Pt ambulated total of 8 steps fwd then  retro over two trials. Slow velocity with significant time in bilateral stance. No threat for LOB.)  Transfers  Transfer: Yes  Transfer 1  Technique 1: Sit to stand, Stand to sit  Transfer Device 1: Walker  Transfer Level of Assistance 1: Contact guard (x1 trial from EOB)    Outcome Measures:  [facility] Basic Mobility  Turning from your back to your side while in a flat bed without using bedrails: A lot  Moving from lying on your back to sitting on the side of a flat bed without using bedrails: A lot  Moving to and from bed to chair (including a wheelchair): A little  Standing up from a chair using your arms (e.g. wheelchair or bedside chair): A little  To walk in hospital room: A little  Climbing 3-5 steps with railing: Total  Basic Mobility - Total Score: 14    Education Documentation  Body Mechanics, taught by Haleigh Wilson PT at 4/15/2025  3:01 PM.  Learner: Patient  Readiness: Acceptance  Method: Explanation  Response: Needs Reinforcement  Comment: RLE positioning during sit<>stand,progress toward goals    Mobility Training, taught by Haleigh Wilson PT at 4/15/2025  3:01 PM.  Learner: Patient  Readiness: Acceptance  Method: Explanation  Response: Needs Reinforcement  Comment: RLE positioning during sit<>stand,progress toward goals    Education Comments  No comments found.      Encounter Problems       Encounter Problems (Active)       PT STG Problem       Patient will transfer supine to sit and sit to supine with supervision assist to facilitate mobility. (Progressing)       Start:  04/14/25    Expected End:  04/30/25            Patient will transfer sit to stand and stand to sit with supervision assist to facilitate mobility. (Progressing)       Start:  04/14/25    Expected End:  04/30/25            Patient will transfer bed to chair and chair to bed with supervision assist to facilitate mobility. (Not Progressing)       Start:  04/14/25    Expected End:  04/30/25            Patient will amb 50 feet rolling  walker device including two turns on even surface with supervision assist to facilitate safe mobility.   (Progressing)       Start:  04/14/25    Expected End:  04/30/25            Patient will negotiate 4 stairs with no rail(s) and minimal} assist with straight cane device for in home and community. (Not Progressing)       Start:  04/14/25    Expected End:  04/30/25            Patient will increase ROM by 1/2 range throughout right lower extremity to improve functional mobility. (Progressing)       Start:  04/14/25    Expected End:  04/30/25

## 2025-04-16 LAB
ALBUMIN SERPL BCP-MCNC: 3 G/DL (ref 3.4–5)
ALP SERPL-CCNC: 53 U/L (ref 33–136)
ALT SERPL W P-5'-P-CCNC: 6 U/L (ref 10–52)
ANION GAP SERPL CALCULATED.3IONS-SCNC: 11 MMOL/L (ref 10–20)
AST SERPL W P-5'-P-CCNC: 14 U/L (ref 9–39)
BILIRUB SERPL-MCNC: 0.4 MG/DL (ref 0–1.2)
BUN SERPL-MCNC: 67 MG/DL (ref 6–23)
CALCIUM SERPL-MCNC: 8.8 MG/DL (ref 8.6–10.3)
CHLORIDE SERPL-SCNC: 107 MMOL/L (ref 98–107)
CO2 SERPL-SCNC: 25 MMOL/L (ref 21–32)
CREAT SERPL-MCNC: 2.27 MG/DL (ref 0.5–1.3)
EGFRCR SERPLBLD CKD-EPI 2021: 32 ML/MIN/1.73M*2
ERYTHROCYTE [DISTWIDTH] IN BLOOD BY AUTOMATED COUNT: 13.8 % (ref 11.5–14.5)
GLUCOSE SERPL-MCNC: 113 MG/DL (ref 74–99)
HCT VFR BLD AUTO: 30.2 % (ref 41–52)
HGB BLD-MCNC: 9.8 G/DL (ref 13.5–17.5)
MCH RBC QN AUTO: 30 PG (ref 26–34)
MCHC RBC AUTO-ENTMCNC: 32.5 G/DL (ref 32–36)
MCV RBC AUTO: 92 FL (ref 80–100)
NRBC BLD-RTO: 0 /100 WBCS (ref 0–0)
PLATELET # BLD AUTO: 240 X10*3/UL (ref 150–450)
POTASSIUM SERPL-SCNC: 4.3 MMOL/L (ref 3.5–5.3)
PROT SERPL-MCNC: 5.8 G/DL (ref 6.4–8.2)
RBC # BLD AUTO: 3.27 X10*6/UL (ref 4.5–5.9)
SODIUM SERPL-SCNC: 139 MMOL/L (ref 136–145)
WBC # BLD AUTO: 8.4 X10*3/UL (ref 4.4–11.3)

## 2025-04-16 PROCEDURE — 2500000001 HC RX 250 WO HCPCS SELF ADMINISTERED DRUGS (ALT 637 FOR MEDICARE OP): Performed by: NURSE PRACTITIONER

## 2025-04-16 PROCEDURE — 80053 COMPREHEN METABOLIC PANEL: CPT | Performed by: STUDENT IN AN ORGANIZED HEALTH CARE EDUCATION/TRAINING PROGRAM

## 2025-04-16 PROCEDURE — 94640 AIRWAY INHALATION TREATMENT: CPT

## 2025-04-16 PROCEDURE — 36415 COLL VENOUS BLD VENIPUNCTURE: CPT | Performed by: STUDENT IN AN ORGANIZED HEALTH CARE EDUCATION/TRAINING PROGRAM

## 2025-04-16 PROCEDURE — 85027 COMPLETE CBC AUTOMATED: CPT | Performed by: STUDENT IN AN ORGANIZED HEALTH CARE EDUCATION/TRAINING PROGRAM

## 2025-04-16 PROCEDURE — 97116 GAIT TRAINING THERAPY: CPT | Mod: GP,CQ

## 2025-04-16 PROCEDURE — 97530 THERAPEUTIC ACTIVITIES: CPT | Mod: GP,CQ

## 2025-04-16 PROCEDURE — 99024 POSTOP FOLLOW-UP VISIT: CPT | Performed by: NURSE PRACTITIONER

## 2025-04-16 PROCEDURE — 99232 SBSQ HOSP IP/OBS MODERATE 35: CPT | Performed by: STUDENT IN AN ORGANIZED HEALTH CARE EDUCATION/TRAINING PROGRAM

## 2025-04-16 PROCEDURE — 1210000001 HC SEMI-PRIVATE ROOM DAILY

## 2025-04-16 PROCEDURE — 2500000004 HC RX 250 GENERAL PHARMACY W/ HCPCS (ALT 636 FOR OP/ED): Performed by: SURGERY

## 2025-04-16 PROCEDURE — 2500000004 HC RX 250 GENERAL PHARMACY W/ HCPCS (ALT 636 FOR OP/ED): Performed by: NURSE PRACTITIONER

## 2025-04-16 RX ADMIN — HEPARIN SODIUM 5000 UNITS: 5000 INJECTION INTRAVENOUS; SUBCUTANEOUS at 11:55

## 2025-04-16 RX ADMIN — OXYCODONE HYDROCHLORIDE 5 MG: 5 TABLET ORAL at 08:35

## 2025-04-16 RX ADMIN — ASPIRIN 81 MG: 81 TABLET, COATED ORAL at 08:01

## 2025-04-16 RX ADMIN — HEPARIN SODIUM 5000 UNITS: 5000 INJECTION INTRAVENOUS; SUBCUTANEOUS at 20:39

## 2025-04-16 RX ADMIN — TIOTROPIUM BROMIDE INHALATION SPRAY 2 PUFF: 3.12 SPRAY, METERED RESPIRATORY (INHALATION) at 08:12

## 2025-04-16 RX ADMIN — OXYCODONE HYDROCHLORIDE 5 MG: 5 TABLET ORAL at 20:39

## 2025-04-16 RX ADMIN — CARVEDILOL 25 MG: 25 TABLET, FILM COATED ORAL at 16:57

## 2025-04-16 RX ADMIN — FLUTICASONE FUROATE AND VILANTEROL TRIFENATATE 1 PUFF: 100; 25 POWDER RESPIRATORY (INHALATION) at 08:14

## 2025-04-16 RX ADMIN — FUROSEMIDE 40 MG: 40 TABLET ORAL at 08:01

## 2025-04-16 RX ADMIN — CARVEDILOL 25 MG: 25 TABLET, FILM COATED ORAL at 08:01

## 2025-04-16 RX ADMIN — HEPARIN SODIUM 5000 UNITS: 5000 INJECTION INTRAVENOUS; SUBCUTANEOUS at 04:22

## 2025-04-16 RX ADMIN — POLYETHYLENE GLYCOL 3350 17 G: 17 POWDER, FOR SOLUTION ORAL at 08:01

## 2025-04-16 ASSESSMENT — COGNITIVE AND FUNCTIONAL STATUS - GENERAL
STANDING UP FROM CHAIR USING ARMS: A LITTLE
CLIMB 3 TO 5 STEPS WITH RAILING: A LOT
STANDING UP FROM CHAIR USING ARMS: A LOT
TURNING FROM BACK TO SIDE WHILE IN FLAT BAD: A LITTLE
DRESSING REGULAR LOWER BODY CLOTHING: A LOT
WALKING IN HOSPITAL ROOM: A LOT
CLIMB 3 TO 5 STEPS WITH RAILING: TOTAL
DRESSING REGULAR UPPER BODY CLOTHING: A LITTLE
PERSONAL GROOMING: A LITTLE
TURNING FROM BACK TO SIDE WHILE IN FLAT BAD: A LITTLE
DRESSING REGULAR LOWER BODY CLOTHING: A LOT
MOBILITY SCORE: 15
CLIMB 3 TO 5 STEPS WITH RAILING: A LOT
STANDING UP FROM CHAIR USING ARMS: A LOT
DAILY ACTIVITIY SCORE: 18
TOILETING: A LITTLE
MOBILITY SCORE: 15
MOBILITY SCORE: 16
MOVING FROM LYING ON BACK TO SITTING ON SIDE OF FLAT BED WITH BEDRAILS: A LITTLE
DAILY ACTIVITIY SCORE: 18
TOILETING: A LITTLE
TURNING FROM BACK TO SIDE WHILE IN FLAT BAD: A LITTLE
WALKING IN HOSPITAL ROOM: A LOT
MOVING TO AND FROM BED TO CHAIR: A LOT
PERSONAL GROOMING: A LITTLE
MOVING TO AND FROM BED TO CHAIR: A LITTLE
HELP NEEDED FOR BATHING: A LITTLE
WALKING IN HOSPITAL ROOM: A LITTLE
DRESSING REGULAR UPPER BODY CLOTHING: A LITTLE
MOVING TO AND FROM BED TO CHAIR: A LOT
HELP NEEDED FOR BATHING: A LITTLE

## 2025-04-16 ASSESSMENT — PAIN SCALES - GENERAL
PAINLEVEL_OUTOF10: 5 - MODERATE PAIN
PAINLEVEL_OUTOF10: 8
PAINLEVEL_OUTOF10: 0 - NO PAIN
PAINLEVEL_OUTOF10: 0 - NO PAIN
PAINLEVEL_OUTOF10: 3
PAINLEVEL_OUTOF10: 8

## 2025-04-16 ASSESSMENT — PAIN - FUNCTIONAL ASSESSMENT
PAIN_FUNCTIONAL_ASSESSMENT: 0-10
PAIN_FUNCTIONAL_ASSESSMENT: 0-10
PAIN_FUNCTIONAL_ASSESSMENT: CPOT (CRITICAL CARE PAIN OBSERVATION TOOL)
PAIN_FUNCTIONAL_ASSESSMENT: 0-10
PAIN_FUNCTIONAL_ASSESSMENT: 0-10

## 2025-04-16 ASSESSMENT — PAIN DESCRIPTION - DESCRIPTORS: DESCRIPTORS: ACHING;DISCOMFORT

## 2025-04-16 ASSESSMENT — PAIN DESCRIPTION - LOCATION: LOCATION: LEG

## 2025-04-16 ASSESSMENT — PAIN DESCRIPTION - ORIENTATION: ORIENTATION: RIGHT

## 2025-04-16 NOTE — PROGRESS NOTES
Colten Eason is a 64 y.o. male on day 7 of admission presenting with Fall, initial encounter.      Subjective   Denied any complains. Pain is controlled.       Objective     Last Recorded Vitals  /68 (BP Location: Left arm, Patient Position: Lying)   Pulse 65   Temp 35.6 °C (96.1 °F) (Skin)   Resp 18   Wt 132 kg (290 lb)   SpO2 95% Comment: Simultaneous filing. User may be unaware of other data.  Intake/Output last 3 Shifts:    Intake/Output Summary (Last 24 hours) at 4/16/2025 1100  Last data filed at 4/16/2025 1000  Gross per 24 hour   Intake --   Output 3000 ml   Net -3000 ml       Admission Weight  Weight: 129 kg (285 lb) (04/09/25 1117)    Daily Weight  04/16/25 : 132 kg (290 lb)    Image Results  MR knee right wo IV contrast  Narrative: Interpreted By:  Ignacio Agosto,   STUDY:  MRI of the right knee without contrast dated 4/13/2025.      INDICATION:  Signs/Symptoms:R quadricep tendon injury      COMPARISON:  None.      ACCESSION NUMBER(S):  [id number]      ORDERING CLINICIAN:  HORTENCIA LEBRON      TECHNIQUE:  Multiplanar multisequence MRI of the right knee was performed  without intravenous contrast.      FINDINGS:  MUSCLES, TENDONS, AND LIGAMENTS:      The anterior cruciate ligament is intact.  The posterior cruciate  ligament is intact. The medial collateral ligament is intact. The  lateral collateral ligament complex is intact. The popliteus and  biceps femoris tendons, iliotibial band, and extensor mechanism are  intact.      MENISCI:      The medial meniscus is intact.  The lateral meniscus is intact.      OSSEOUS STRUCTURES AND JOINTS:      No fracture or dislocation is evident. There is marginal femorotibial  and patellofemoral osteophyte formation. As seen on this exam of the  hyaline articular cartilage in the femorotibial joint spaces is  intact. There is full-thickness fissuring/loss of hyaline articular  cartilage of the patellar apex with some foci of  mild-to-moderate  partial-thickness fissuring of the hyaline articular cartilage of the  medial and lateral facets of the patella. There is a moderate volume  knee joint effusion.      SOFT TISSUES:      No significant volume of fluid is evident in a popliteal cyst.  Extensive reticular increased T2 signal intensity is seen in the  subcutaneous tissues. In the subcutaneous tissues of the anterior to  anteromedial proximal leg there is a fusiform heterogeneous  collection measuring up to approximately 1.5 x 9.3 x 5.8 cm. It is  predominantly hypointense on T2 weighted imaging with regions of some  higher signal intensity, and heterogeneous with regions of  hyperintensity and hypointensity on T1 weighted imaging.      Impression: 1. Findings most compatible with evolving hematoma in the  subcutaneous tissues of the anterior to anteromedial proximal leg.  2. Diffuse reticular increased T2 signal intensity in the  subcutaneous tissues which may represent lymphedema and/or cellulitis.  3. As seen on this examination of the quadriceps tendon appears to be  intact. If there is concern for compromise of the quadriceps more  proximally in the thigh, thigh MRI is recommended.  4. Moderate volume knee joint effusion.  5. Mild-to-moderate degenerative change of the knee.      Signed by: Ignacio Agosto 4/13/2025 1:53 PM  Dictation workstation:   [id number]      Physical Exam  General: Pt is alert and oriented x 3. Pleasant, conversive  HEENT: Head is atraumatic, normocephalic.   Cardiac:   Regular rate and rhythm.  No murmurs.  Respiratory: Lungs clear to auscultation.  No adventitious sounds.  Abdomen: Soft, nondistended, nontender.  Bowel sounds x4 quadrants.   morbidly obese  Pulse exam: Palpable brachial and radial pulses bilaterally.   Femoral, popliteal, pedal pulses are palpable bilaterally.  Extremities: 1+ pitting edema right lower extremity.  The right medial thigh/knee hematoma has been decompressed quite  significantly.  The fluid-filled bulla to the medial aspect of the thigh has ruptured.  Wound with tissue is mostly granular.   Surgical incision near the knee is well-approximated with sutures in place.  There is some erythema janine-incisional he.  Neuro: Moves all extremities spontaneously.  No focal deficits.  Psych: Appropriate affect.  Answers questions appropriately  Relevant Results                              Assessment & Plan  Fall, initial encounter    COPD (chronic obstructive pulmonary disease) (Multi)    Encounter for deep vein thrombosis (DVT) prophylaxis    CKD (chronic kidney disease)    Hematoma    Hematoma  Right thigh hematoma due to fall and is resolved status post right thigh hematoma evacuation 4/10 by vascular-treatment per vascular-today's note noted and today's orthopedic's note noted as well  VSS  Labs as above  Pain/nausea medication  On low-sodium diet  I-S  PT  Out of bed     COPD (chronic obstructive pulmonary disease) (Multi)  Stable  Continue with Spiriva     Encounter for deep vein thrombosis (DVT) prophylaxis  Heparin     CKD (chronic kidney disease)  Observe  Hold lasix for today  Follow bmp in am      GENESIS  CPAP     PT: Rehab, pending placement           Ani Jansen MD       I will START or STAY ON the medications listed below when I get home from the hospital:  None

## 2025-04-16 NOTE — PROGRESS NOTES
04/16/25 0955   Discharge Planning   Who is requesting discharge planning? Provider   Home or Post Acute Services Post acute facilities (Rehab/SNF/etc)   Type of Post Acute Facility Services Skilled nursing   Expected Discharge Disposition SNF   Does the patient need discharge transport arranged? Yes   RoundTrip coordination needed? Yes     Spoke to patient. Sent referral to [facility] via careport.     UPDATE 1300: Faxed C9 to Tia at [facility]. Attached C9 and sent to [facility].     UPDATE 1605: Per [facility] planning on final approval for tomorrow with workers comp and will accept patient tomorrow.

## 2025-04-16 NOTE — CARE PLAN
The patient's goals for the shift include Patient Safety    The clinical goals for the shift include rest and remain safe

## 2025-04-16 NOTE — PROGRESS NOTES
Colten Eason is a 64 y.o. male on day 7 of admission presenting with Fall, initial encounter.      Subjective  Patient nondistressed on exam  Evaluated bedside with nursing present, dressing changed without incident  He denies nausea, vomiting fever or chills  Awaiting facility placement    Objective        Last Recorded Vitals      4/15/2025    11:00 AM 4/15/2025     5:32 PM 4/15/2025     7:40 PM 4/15/2025    11:22 PM 4/16/2025     3:32 AM 4/16/2025     3:43 AM 4/16/2025     8:14 AM   Vitals   Systolic 121 152 123 127  140 131   Diastolic 74 87 64 77  81 68   BP Location Left arm Right arm Right arm Right arm  Right arm Left arm   Heart Rate 77 79 84 79  67 65   Temp 36.8 °C (98.2 °F) 36.2 °C (97.2 °F) 36.3 °C (97.3 °F) 36.1 °C (97 °F)  36.4 °C (97.5 °F) 35.6 °C (96.1 °F)   Resp  17 18 16  18 18   Weight (lb)     290     BMI     44.09 kg/m2     BSA (m2)     2.52 m2         Imaging  Imaging  No results found.    Cardiology, Vascular, and Other Imaging  No other imaging results found for the past 2 days        Relevant Results  Results for orders placed or performed during the hospital encounter of 04/09/25 (from the past 24 hours)   CBC   Result Value Ref Range    WBC 8.4 4.4 - 11.3 x10*3/uL    nRBC 0.0 0.0 - 0.0 /100 WBCs    RBC 3.27 (L) 4.50 - 5.90 x10*6/uL    Hemoglobin 9.8 (L) 13.5 - 17.5 g/dL    Hematocrit 30.2 (L) 41.0 - 52.0 %    MCV 92 80 - 100 fL    MCH 30.0 26.0 - 34.0 pg    MCHC 32.5 32.0 - 36.0 g/dL    RDW 13.8 11.5 - 14.5 %    Platelets 240 150 - 450 x10*3/uL   Comprehensive metabolic panel   Result Value Ref Range    Glucose 113 (H) 74 - 99 mg/dL    Sodium 139 136 - 145 mmol/L    Potassium 4.3 3.5 - 5.3 mmol/L    Chloride 107 98 - 107 mmol/L    Bicarbonate 25 21 - 32 mmol/L    Anion Gap 11 10 - 20 mmol/L    Urea Nitrogen 67 (H) 6 - 23 mg/dL    Creatinine 2.27 (H) 0.50 - 1.30 mg/dL    eGFR 32 (L) >60 mL/min/1.73m*2    Calcium 8.8 8.6 - 10.3 mg/dL    Albumin 3.0 (L) 3.4 - 5.0 g/dL    Alkaline  Phosphatase 53 33 - 136 U/L    Total Protein 5.8 (L) 6.4 - 8.2 g/dL    AST 14 9 - 39 U/L    Bilirubin, Total 0.4 0.0 - 1.2 mg/dL    ALT 6 (L) 10 - 52 U/L       Physical Exam     General: Pt is alert and oriented x 3. Pleasant, conversive  HEENT: Head is atraumatic, normocephalic.   Cardiac:   Regular rate and rhythm.  No murmurs.  Respiratory: Lungs clear to auscultation.  No adventitious sounds.  Abdomen: Soft, nondistended, nontender.  Bowel sounds x4 quadrants.   morbidly obese  Pulse exam: Palpable brachial and radial pulses bilaterally.   Femoral, popliteal, pedal pulses are palpable bilaterally.  Extremities: 1+ pitting edema right lower extremity.  The right medial thigh/knee hematoma has been decompressed quite significantly.  The fluid-filled bulla to the medial aspect of the thigh has ruptured.  Wound with tissue is mostly granular.   Surgical incision near the knee is well-approximated with sutures in place.  There is some erythema janine-incisional he.  Neuro: Moves all extremities spontaneously.  No focal deficits.  Psych: Appropriate affect.  Answers questions appropriately      Assessment/Plan  Right medial thigh/knee hematoma  Blood loss anemia  THONY on top of CKD, resolved     Patient is status post hematoma evacuation of the right medial thigh.  Dressing was changed today, fluid-fill,ed bullae overlying hematoma is healing nicely..  Awaiting rehab placement     Recommendation:     -Keep compressed from the toes to mid thigh.    -Follow-up at the [facility] on Monday, April 21, 2025 with me.  - Elevate leg is much as possible  -Dressing okay to be changed by nursing daily.  Order placed  -Pain control  -PT/OT, up out of bed.      Okay to discharge from a vascular perspective.

## 2025-04-16 NOTE — DISCHARGE INSTRUCTIONS
Please perform daily dressing change of the right leg.  Wash with soap and water.  Pat dry  Apply Xeroform to the old blister site, iodine to incision site, dry sterile dressing, Kerlix to the thigh.  Apply Ace wrap from base of toes extending up to the thigh.

## 2025-04-16 NOTE — PROGRESS NOTES
Physical Therapy    Physical Therapy Treatment    Patient Name: Colten Eason  MRN: [id number]  Department: [address]  Room: 08/08-A  Today's Date: 4/16/2025  Time Calculation  Start Time: 1330  Stop Time: 1355  Time Calculation (min): 25 min         Assessment/Plan   PT Assessment  Rehab Prognosis: Good  Barriers to Discharge Home: Caregiver assistance, Physical needs  Caregiver Assistance: Caregiver assistance needed per identified barriers - however, level of patient's required assistance exceeds assistance available at home  Physical Needs: Stair navigation into home limited by function/safety, Intermittent mobility assistance needed, Intermittent ADL assistance needed, High falls risk due to function or environment  End of Session Communication: Bedside nurse  Assessment Comment: Pt made adequate progress toward his functional mobility goals. Pt required minimally increased assist during functional mobility compared to his reported baseline of indep. Pt would benefit from continued skilled PT services for maximizing independence and safety prior to & after discharge (MODERATE intensity).  End of Session Patient Position: Bed, 4 rail up, Alarm on (Pt requesting all rails up to prevent items from falling off bed, RN aware.)  PT Plan  Inpatient/Swing Bed or Outpatient: Inpatient  PT Plan  Treatment/Interventions: Bed mobility, Transfer training, Gait training, Stair training, Balance training, Strengthening, Endurance training, Range of motion, Therapeutic exercise, Therapeutic activity  PT Plan: Ongoing PT  PT Frequency: 5 times per week  PT Discharge Recommendations: Moderate intensity level of continued care  Equipment Recommended upon Discharge: Wheeled walker  PT Recommended Transfer Status: Assist x1, Assistive device  PT - OK to Discharge: Yes      General Visit Information:   PT  Visit  PT Received On: 04/16/25  General  Reason for Referral: Impaired functional mobility due to decreased R knee AROM. Pt  admitted after fall with a  R thigh hematoma. He was now s/p R thigh hematoma evacuation on 4/10/25.  Referred By: MATTHEW Godfrey-CNP  Past Medical History Relevant to Rehab: CKD, COPD, HTN, GENESIS  Family/Caregiver Present: Yes  Caregiver Feedback: sister in law at bedside, left room for session  Prior to Session Communication: Bedside nurse  Patient Position Received: Bed, 3 rail up, Alarm off, not on at start of session  General Comment: Pt cleared for PT by nursing. Pt agreeable to PT services.    Subjective   Precautions:  Precautions  LE Weight Bearing Status: Weight Bearing as Tolerated (RLE)  Medical Precautions: Fall precautions  Precautions Comment: + tele            Objective   Pain:  Pain Assessment  Pain Assessment: 0-10  0-10 (Numeric) Pain Score: 8  Pain Type: Acute pain  Pain Location: Leg  Pain Orientation: Right  Pain Interventions: Repositioned, Ambulation/increased activity  Response to Interventions: No change in pain  Cognition:  Cognition  Overall Cognitive Status: Within Functional Limits  Orientation Level: Oriented X4    Postural Control:  Static Sitting Balance  Static Sitting-Balance Support: Feet supported  Static Sitting-Level of Assistance: Close supervision  Static Sitting-Comment/Number of Minutes: good seated static balance  Static Standing Balance  Static Standing-Balance Support: Bilateral upper extremity supported  Static Standing-Level of Assistance: Contact guard  Static Standing-Comment/Number of Minutes: fair + static stand balance    Treatments:  Therapeutic Exercise  Therapeutic Exercise Performed: Yes  Therapeutic Exercise Activity 1: Pt attempting RLE LAQs, unable to tolerate. Deferring therex this date d/t increased pain.    Bed Mobility  Bed Mobility: Yes  Bed Mobility 1  Bed Mobility 1: Supine to sitting, Sitting to supine  Level of Assistance 1: Moderate assistance  Bed Mobility Comments 1: ModA to raise trunk and clear RLE over EOB when entering and exiting.  Cues for hand placement, sequencing, and weight shift. Increased time and effort to scoot to EOB.  Bed Mobility 2  Bed Mobility  2: Scooting  Level of Assistance 2: Minimum assistance  Bed Mobility Comments 2: Jillian to scoot towards EOB with use of draw sheet.    Ambulation/Gait Training  Ambulation/Gait Training Performed: Yes  Ambulation/Gait Training 1  Surface 1: Level tile  Device 1: Rolling walker  Assistance 1: Contact guard  Quality of Gait 1: Wide base of support, Decreased step length, Antalgic  Comments/Distance (ft) 1: 6 feet x4. Brief standing rest breaks between trials. Non reciprocal, antalgic gait. Pt able to navigate directional turns with no LOB. Cues for increased BUE support. Good carryover of sequencing.  Transfers  Transfer: Yes  Transfer 1  Transfer From 1: Sit to, Stand to  Transfer to 1: Stand, Sit  Technique 1: Sit to stand, Stand to sit  Transfer Device 1: Walker  Transfer Level of Assistance 1: Minimum assistance  Trials/Comments 1: Jillian to raise trunk and provide stability upon standing. Cues for extending R knee to minimize pain upon standing with poor carryover. Cues for smaller SARAH for safe AD mgmt.    Outcome Measures:  [facility] Basic Mobility  Turning from your back to your side while in a flat bed without using bedrails: A little  Moving from lying on your back to sitting on the side of a flat bed without using bedrails: A little  Moving to and from bed to chair (including a wheelchair): A little  Standing up from a chair using your arms (e.g. wheelchair or bedside chair): A little  To walk in hospital room: A little  Climbing 3-5 steps with railing: Total  Basic Mobility - Total Score: 16    Education Documentation  Body Mechanics, taught by Meka Cyr PTA at 4/16/2025  2:17 PM.  Learner: Patient  Readiness: Acceptance  Method: Explanation, Demonstration  Response: Verbalizes Understanding, Needs Reinforcement  Comment: Safety awareness, POC, cont mobility.    Mobility Training,  taught by Meka Cyr PTA at 4/16/2025  2:17 PM.  Learner: Patient  Readiness: Acceptance  Method: Explanation, Demonstration  Response: Verbalizes Understanding, Needs Reinforcement  Comment: Safety awareness, POC, cont mobility.    Education Comments  No comments found.        Encounter Problems       Encounter Problems (Active)       PT STG Problem       Patient will transfer supine to sit and sit to supine with supervision assist to facilitate mobility. (Progressing)       Start:  04/14/25    Expected End:  04/30/25            Patient will transfer sit to stand and stand to sit with supervision assist to facilitate mobility. (Progressing)       Start:  04/14/25    Expected End:  04/30/25            Patient will transfer bed to chair and chair to bed with supervision assist to facilitate mobility. (Progressing)       Start:  04/14/25    Expected End:  04/30/25            Patient will amb 50 feet rolling walker device including two turns on even surface with supervision assist to facilitate safe mobility.   (Progressing)       Start:  04/14/25    Expected End:  04/30/25            Patient will negotiate 4 stairs with no rail(s) and minimal} assist with straight cane device for in home and community. (Not Progressing)       Start:  04/14/25    Expected End:  04/30/25            Patient will increase ROM by 1/2 range throughout right lower extremity to improve functional mobility. (Progressing)       Start:  04/14/25    Expected End:  04/30/25

## 2025-04-16 NOTE — CARE PLAN
Problem: Safety - Adult  Goal: Free from fall injury  Outcome: Progressing     Problem: Discharge Planning  Goal: Discharge to home or other facility with appropriate resources  Outcome: Progressing     Problem: Chronic Conditions and Co-morbidities  Goal: Patient's chronic conditions and co-morbidity symptoms are monitored and maintained or improved  Outcome: Progressing     Problem: Nutrition  Goal: Nutrient intake appropriate for maintaining nutritional needs  Outcome: Progressing     Problem: Skin  Goal: Decreased wound size/increased tissue granulation at next dressing change  Outcome: Progressing  Goal: Participates in plan/prevention/treatment measures  Outcome: Progressing  Goal: Prevent/manage excess moisture  Outcome: Progressing  Goal: Prevent/minimize sheer/friction injuries  Outcome: Progressing  Goal: Promote/optimize nutrition  Outcome: Progressing  Goal: Promote skin healing  Outcome: Progressing     Problem: Fall/Injury  Goal: Not fall by end of shift  Outcome: Progressing  Goal: Be free from injury by end of the shift  Outcome: Progressing  Goal: Verbalize understanding of personal risk factors for fall in the hospital  Outcome: Progressing  Goal: Verbalize understanding of risk factor reduction measures to prevent injury from fall in the home  Outcome: Progressing  Goal: Use assistive devices by end of the shift  Outcome: Progressing  Goal: Pace activities to prevent fatigue by end of the shift  Outcome: Progressing     Problem: Pain  Goal: Takes deep breaths with improved pain control throughout the shift  Outcome: Progressing  Goal: Turns in bed with improved pain control throughout the shift  Outcome: Progressing  Goal: Walks with improved pain control throughout the shift  Outcome: Progressing  Goal: Performs ADL's with improved pain control throughout shift  Outcome: Progressing  Goal: Participates in PT with improved pain control throughout the shift  Outcome: Progressing  Goal: Free from  opioid side effects throughout the shift  Outcome: Progressing  Goal: Free from acute confusion related to pain meds throughout the shift  Outcome: Progressing     Problem: Respiratory  Goal: No signs of respiratory distress (eg. Use of accessory muscles. Peds grunting)  Outcome: Progressing

## 2025-04-17 LAB
ANION GAP SERPL CALCULATED.3IONS-SCNC: 13 MMOL/L (ref 10–20)
BUN SERPL-MCNC: 70 MG/DL (ref 6–23)
CALCIUM SERPL-MCNC: 8.9 MG/DL (ref 8.6–10.3)
CHLORIDE SERPL-SCNC: 106 MMOL/L (ref 98–107)
CO2 SERPL-SCNC: 24 MMOL/L (ref 21–32)
CREAT SERPL-MCNC: 2.3 MG/DL (ref 0.5–1.3)
EGFRCR SERPLBLD CKD-EPI 2021: 31 ML/MIN/1.73M*2
GLUCOSE SERPL-MCNC: 110 MG/DL (ref 74–99)
HOLD SPECIMEN: NORMAL
POTASSIUM SERPL-SCNC: 4.2 MMOL/L (ref 3.5–5.3)
SODIUM SERPL-SCNC: 139 MMOL/L (ref 136–145)

## 2025-04-17 PROCEDURE — 2500000004 HC RX 250 GENERAL PHARMACY W/ HCPCS (ALT 636 FOR OP/ED): Performed by: SURGERY

## 2025-04-17 PROCEDURE — 97116 GAIT TRAINING THERAPY: CPT | Mod: GP

## 2025-04-17 PROCEDURE — 2500000001 HC RX 250 WO HCPCS SELF ADMINISTERED DRUGS (ALT 637 FOR MEDICARE OP): Performed by: NURSE PRACTITIONER

## 2025-04-17 PROCEDURE — 36415 COLL VENOUS BLD VENIPUNCTURE: CPT | Performed by: STUDENT IN AN ORGANIZED HEALTH CARE EDUCATION/TRAINING PROGRAM

## 2025-04-17 PROCEDURE — 97110 THERAPEUTIC EXERCISES: CPT | Mod: GP

## 2025-04-17 PROCEDURE — 99232 SBSQ HOSP IP/OBS MODERATE 35: CPT | Performed by: STUDENT IN AN ORGANIZED HEALTH CARE EDUCATION/TRAINING PROGRAM

## 2025-04-17 PROCEDURE — 94640 AIRWAY INHALATION TREATMENT: CPT

## 2025-04-17 PROCEDURE — 80048 BASIC METABOLIC PNL TOTAL CA: CPT | Performed by: STUDENT IN AN ORGANIZED HEALTH CARE EDUCATION/TRAINING PROGRAM

## 2025-04-17 PROCEDURE — 1210000001 HC SEMI-PRIVATE ROOM DAILY

## 2025-04-17 RX ADMIN — HEPARIN SODIUM 5000 UNITS: 5000 INJECTION INTRAVENOUS; SUBCUTANEOUS at 21:30

## 2025-04-17 RX ADMIN — ASPIRIN 81 MG: 81 TABLET, COATED ORAL at 08:38

## 2025-04-17 RX ADMIN — FLUTICASONE FUROATE AND VILANTEROL TRIFENATATE 1 PUFF: 100; 25 POWDER RESPIRATORY (INHALATION) at 07:40

## 2025-04-17 RX ADMIN — OXYCODONE HYDROCHLORIDE 5 MG: 5 TABLET ORAL at 19:32

## 2025-04-17 RX ADMIN — OXYCODONE HYDROCHLORIDE 5 MG: 5 TABLET ORAL at 07:34

## 2025-04-17 RX ADMIN — HEPARIN SODIUM 5000 UNITS: 5000 INJECTION INTRAVENOUS; SUBCUTANEOUS at 14:53

## 2025-04-17 RX ADMIN — HEPARIN SODIUM 5000 UNITS: 5000 INJECTION INTRAVENOUS; SUBCUTANEOUS at 04:07

## 2025-04-17 RX ADMIN — CARVEDILOL 25 MG: 25 TABLET, FILM COATED ORAL at 16:54

## 2025-04-17 RX ADMIN — TIOTROPIUM BROMIDE INHALATION SPRAY 2 PUFF: 3.12 SPRAY, METERED RESPIRATORY (INHALATION) at 07:40

## 2025-04-17 RX ADMIN — CARVEDILOL 25 MG: 25 TABLET, FILM COATED ORAL at 08:38

## 2025-04-17 ASSESSMENT — COGNITIVE AND FUNCTIONAL STATUS - GENERAL
MOBILITY SCORE: 13
HELP NEEDED FOR BATHING: A LITTLE
WALKING IN HOSPITAL ROOM: A LITTLE
TURNING FROM BACK TO SIDE WHILE IN FLAT BAD: A LOT
DRESSING REGULAR UPPER BODY CLOTHING: A LITTLE
MOVING FROM LYING ON BACK TO SITTING ON SIDE OF FLAT BED WITH BEDRAILS: A LOT
MOVING TO AND FROM BED TO CHAIR: A LOT
TOILETING: A LITTLE
MOBILITY SCORE: 16
DRESSING REGULAR LOWER BODY CLOTHING: A LITTLE
STANDING UP FROM CHAIR USING ARMS: A LOT
CLIMB 3 TO 5 STEPS WITH RAILING: TOTAL
MOBILITY SCORE: 16
STANDING UP FROM CHAIR USING ARMS: A LOT
WALKING IN HOSPITAL ROOM: A LOT
WALKING IN HOSPITAL ROOM: A LOT
DAILY ACTIVITIY SCORE: 18
DRESSING REGULAR UPPER BODY CLOTHING: A LITTLE
MOVING TO AND FROM BED TO CHAIR: A LOT
PERSONAL GROOMING: A LITTLE
DRESSING REGULAR LOWER BODY CLOTHING: A LOT
CLIMB 3 TO 5 STEPS WITH RAILING: A LOT
STANDING UP FROM CHAIR USING ARMS: A LOT
HELP NEEDED FOR BATHING: A LITTLE
CLIMB 3 TO 5 STEPS WITH RAILING: A LOT
PERSONAL GROOMING: A LITTLE
MOVING TO AND FROM BED TO CHAIR: A LITTLE
TOILETING: A LITTLE
DAILY ACTIVITIY SCORE: 19

## 2025-04-17 ASSESSMENT — PAIN DESCRIPTION - DESCRIPTORS
DESCRIPTORS: ACHING
DESCRIPTORS: ACHING

## 2025-04-17 ASSESSMENT — PAIN SCALES - GENERAL
PAINLEVEL_OUTOF10: 4
PAINLEVEL_OUTOF10: 7
PAINLEVEL_OUTOF10: 0 - NO PAIN
PAINLEVEL_OUTOF10: 4
PAINLEVEL_OUTOF10: 6

## 2025-04-17 ASSESSMENT — PAIN - FUNCTIONAL ASSESSMENT
PAIN_FUNCTIONAL_ASSESSMENT: 0-10

## 2025-04-17 ASSESSMENT — PAIN DESCRIPTION - ORIENTATION: ORIENTATION: RIGHT

## 2025-04-17 ASSESSMENT — PAIN DESCRIPTION - LOCATION: LOCATION: LEG

## 2025-04-17 NOTE — CARE PLAN
Problem: PT STG Problem  Goal: Patient will transfer supine to sit and sit to supine with supervision assist to facilitate mobility.  Outcome: Progressing  Goal: Patient will transfer sit to stand and stand to sit with supervision assist to facilitate mobility.  Outcome: Progressing  Goal: Patient will amb 50 feet rolling walker device including two turns on even surface with supervision assist to facilitate safe mobility.    Outcome: Progressing

## 2025-04-17 NOTE — PROGRESS NOTES
Physical Therapy    Physical Therapy Treatment    Patient Name: Colten Eason  MRN: [id number]  Department: [address]  Room: [address] [place] [address]  Today's Date: 4/17/2025  Time Calculation  Start Time: 0954  Stop Time: 1029  Time Calculation (min): 35 min         Assessment/Plan   PT Assessment  Rehab Prognosis: Good  Barriers to Discharge Home: Caregiver assistance, Physical needs  Caregiver Assistance: Caregiver assistance needed per identified barriers - however, level of patient's required assistance exceeds assistance available at home  Physical Needs: 24hr mobility assistance needed  Evaluation/Treatment Tolerance: Patient limited by fatigue, Patient limited by pain  End of Session Communication: Bedside nurse  Assessment Comment: Functional mobility improving slowly;  tolerance to activity progressing slowly;  fall risk  End of Session Patient Position: Bed, 3 rail up, Alarm on  PT Plan  Inpatient/Swing Bed or Outpatient: Inpatient  PT Plan  Treatment/Interventions: Bed mobility, Transfer training, Gait training, Stair training, Balance training, Strengthening, Endurance training, Range of motion, Therapeutic exercise, Therapeutic activity  PT Plan: Ongoing PT  PT Frequency: 5 times per week  PT Discharge Recommendations: Moderate intensity level of continued care  Equipment Recommended upon Discharge: Wheeled walker  PT Recommended Transfer Status: Assist x1  PT - OK to Discharge: Yes (with skilled physical therapy services at next level of care)      General Visit Information:   PT  Visit  PT Received On: 04/17/25  General  Prior to Session Communication: Bedside nurse  Patient Position Received: Bed, 3 rail up, Alarm on  General Comment: RN cleared patient for treatment.  Patient reports agreeable to treatment.    Subjective   Precautions:  Precautions  Medical Precautions: Fall precautions            Objective   Pain:  Pain Assessment  Pain Assessment: 0-10  0-10 (Numeric) Pain Score: 4  Pain Type: Acute pain, Surgical  pain  Pain Location: Leg  Pain Orientation: Right  Pain Interventions:  (Therapeutic functional mobility)  Response to Interventions: No change in pain  Cognition:  Cognition  Overall Cognitive Status: Within Functional Limits  Coordination:  Movements are Fluid and Coordinated: No  Lower Body Coordination: right LE movements are slow and guarded  Postural Control:  Static Sitting Balance  Static Sitting-Balance Support: Bilateral upper extremity supported  Static Sitting-Level of Assistance: Close supervision  Static Sitting-Comment/Number of Minutes: Patient postures trunk to right side while sitting on side of bed  Static Standing Balance  Static Standing-Balance Support: Bilateral upper extremity supported  Static Standing-Level of Assistance: Moderate assistance  Static Standing-Comment/Number of Minutes: Assist with trunk support and balance during static standing with rolling walker       Activity Tolerance:  Activity Tolerance  Endurance: Decreased tolerance for upright activites  Activity Tolerance Comments: Fatigue and pain  Treatments:  Therapeutic Exercise  Therapeutic Exercise Performed: Yes  Therapeutic Exercise Activity 1: Ankle pumps, quad sets, heel slides, hip abduction maame LE 5-10 reps x 1 set;  assist with right LE              Bed Mobility  Bed Mobility: Yes  Bed Mobility 1  Bed Mobility 1: Supine to sitting  Level of Assistance 1: Moderate assistance  Bed Mobility Comments 1: Assist with right LE and trunk-up during supine to sit;  increased time and effort required to achieve supine to sit  Bed Mobility 2  Bed Mobility  2: Sitting to supine  Level of Assistance 2: Moderate assistance  Bed Mobility Comments 2: Assist with right LE during supine to sit    Ambulation/Gait Training  Ambulation/Gait Training Performed: Yes  Ambulation/Gait Training 1  Surface 1: Level tile  Device 1: Rolling walker  Assistance 1: Minimum assistance  Comments/Distance (ft) 1: 18 feet x 2 with rolling walker and  assist with trunk support and balance;  verbal cues for gait sequencing;  patient ambulates with slow macy, non-reciprocating gait pattern, decreased step length maame LE (right greater than left), and decreased stance phase right LE relative to left LE.  Transfers  Transfer: Yes  Transfer 1  Transfer From 1: Sit to  Transfer to 1: Stand  Technique 1: Sit to stand  Transfer Device 1: Walker  Transfer Level of Assistance 1: Moderate assistance  Trials/Comments 1: Assist with trunk support and balance during sit to stand from elevated sitting surface (hospital bed);  verbal cues for hand placement and right LE position  Transfers 2  Transfer From 2: Stand to  Transfer to 2: Sit  Technique 2: Stand to sit  Transfer Device 2: Walker  Transfer Level of Assistance 2: Moderate assistance  Trials/Comments 2: Assist with trunk support and balance;  verbal cues for hand placement and right LE position    Stairs  Stairs: No         Outcome Measures:  [facility] Basic Mobility  Turning from your back to your side while in a flat bed without using bedrails: A lot  Moving from lying on your back to sitting on the side of a flat bed without using bedrails: A lot  Moving to and from bed to chair (including a wheelchair): A little  Standing up from a chair using your arms (e.g. wheelchair or bedside chair): A lot  To walk in hospital room: A little  Climbing 3-5 steps with railing: Total  Basic Mobility - Total Score: 13    Education Documentation  No documentation found.  Education Comments  No comments found.        OP EDUCATION:       Encounter Problems       Encounter Problems (Active)       PT STG Problem       Patient will transfer supine to sit and sit to supine with supervision assist to facilitate mobility. (Progressing)       Start:  04/14/25    Expected End:  04/30/25            Patient will transfer sit to stand and stand to sit with supervision assist to facilitate mobility. (Progressing)       Start:  04/14/25    Expected  End:  04/30/25            Patient will transfer bed to chair and chair to bed with supervision assist to facilitate mobility. (Progressing)       Start:  04/14/25    Expected End:  04/30/25            Patient will amb 50 feet rolling walker device including two turns on even surface with supervision assist to facilitate safe mobility.   (Progressing)       Start:  04/14/25    Expected End:  04/30/25            Patient will negotiate 4 stairs with no rail(s) and minimal} assist with straight cane device for in home and community. (Not Progressing)       Start:  04/14/25    Expected End:  04/30/25            Patient will increase ROM by 1/2 range throughout right lower extremity to improve functional mobility. (Progressing)       Start:  04/14/25    Expected End:  04/30/25

## 2025-04-17 NOTE — PROGRESS NOTES
Colten Eason is a 64 y.o. male on day 8 of admission presenting with Fall, initial encounter.      Subjective   Denied any complains. Pain is controlled.       Objective     Last Recorded Vitals  /67 (BP Location: Left arm, Patient Position: Lying)   Pulse 71   Temp 36.6 °C (97.9 °F) (Oral)   Resp 17   Wt 132 kg (290 lb)   SpO2 96%   Intake/Output last 3 Shifts:    Intake/Output Summary (Last 24 hours) at 4/17/2025 1133  Last data filed at 4/17/2025 0900  Gross per 24 hour   Intake 236 ml   Output 4500 ml   Net -4264 ml       Admission Weight  Weight: 129 kg (285 lb) (04/09/25 1117)    Daily Weight  04/16/25 : 132 kg (290 lb)    Image Results  MR knee right wo IV contrast  Narrative: Interpreted By:  Ignacio Agosto,   STUDY:  MRI of the right knee without contrast dated 4/13/2025.      INDICATION:  Signs/Symptoms:R quadricep tendon injury      COMPARISON:  None.      ACCESSION NUMBER(S):  [id number]      ORDERING CLINICIAN:  HORTENCIA LEBRON      TECHNIQUE:  Multiplanar multisequence MRI of the right knee was performed  without intravenous contrast.      FINDINGS:  MUSCLES, TENDONS, AND LIGAMENTS:      The anterior cruciate ligament is intact.  The posterior cruciate  ligament is intact. The medial collateral ligament is intact. The  lateral collateral ligament complex is intact. The popliteus and  biceps femoris tendons, iliotibial band, and extensor mechanism are  intact.      MENISCI:      The medial meniscus is intact.  The lateral meniscus is intact.      OSSEOUS STRUCTURES AND JOINTS:      No fracture or dislocation is evident. There is marginal femorotibial  and patellofemoral osteophyte formation. As seen on this exam of the  hyaline articular cartilage in the femorotibial joint spaces is  intact. There is full-thickness fissuring/loss of hyaline articular  cartilage of the patellar apex with some foci of mild-to-moderate  partial-thickness fissuring of the hyaline articular cartilage of  the  medial and lateral facets of the patella. There is a moderate volume  knee joint effusion.      SOFT TISSUES:      No significant volume of fluid is evident in a popliteal cyst.  Extensive reticular increased T2 signal intensity is seen in the  subcutaneous tissues. In the subcutaneous tissues of the anterior to  anteromedial proximal leg there is a fusiform heterogeneous  collection measuring up to approximately 1.5 x 9.3 x 5.8 cm. It is  predominantly hypointense on T2 weighted imaging with regions of some  higher signal intensity, and heterogeneous with regions of  hyperintensity and hypointensity on T1 weighted imaging.      Impression: 1. Findings most compatible with evolving hematoma in the  subcutaneous tissues of the anterior to anteromedial proximal leg.  2. Diffuse reticular increased T2 signal intensity in the  subcutaneous tissues which may represent lymphedema and/or cellulitis.  3. As seen on this examination of the quadriceps tendon appears to be  intact. If there is concern for compromise of the quadriceps more  proximally in the thigh, thigh MRI is recommended.  4. Moderate volume knee joint effusion.  5. Mild-to-moderate degenerative change of the knee.      Signed by: Ignacio Agosto 4/13/2025 1:53 PM  Dictation workstation:   [id number]      Physical Exam  General: Pt is alert and oriented x 3. Pleasant, conversive  HEENT: Head is atraumatic, normocephalic.   Cardiac:   Regular rate and rhythm.  No murmurs.  Respiratory: Lungs clear to auscultation.  No adventitious sounds.  Abdomen: Soft, nondistended, nontender.  Bowel sounds x4 quadrants.   morbidly obese  Pulse exam: Palpable brachial and radial pulses bilaterally.   Femoral, popliteal, pedal pulses are palpable bilaterally.  Extremities: 1+ pitting edema right lower extremity.  The right medial thigh/knee hematoma has been decompressed quite significantly.  The fluid-filled bulla to the medial aspect of the thigh has ruptured.  Wound  with tissue is mostly granular.   Surgical incision near the knee is well-approximated with sutures in place.  There is some erythema janine-incisional he.  Neuro: Moves all extremities spontaneously.  No focal deficits.  Psych: Appropriate affect.  Answers questions appropriately  Relevant Results                              Assessment & Plan  Fall, initial encounter    COPD (chronic obstructive pulmonary disease) (Multi)    Encounter for deep vein thrombosis (DVT) prophylaxis    CKD (chronic kidney disease)    Hematoma    Hematoma  Right thigh hematoma due to fall and is resolved status post right thigh hematoma evacuation 4/10 by vascular-treatment per vascular-today's note noted and today's orthopedic's note noted as well  VSS  Labs as above  Pain/nausea medication  On low-sodium diet  I-S  PT  Out of bed     COPD (chronic obstructive pulmonary disease) (Multi)  Stable  Continue with Spiriva     Encounter for deep vein thrombosis (DVT) prophylaxis  Heparin     CKD (chronic kidney disease)  Observe  Hold lasix for today  Follow bmp in am     GENESIS  CPAP     PT: Rehab, pending placement           Ani Jansen MD

## 2025-04-17 NOTE — CARE PLAN
The patient's goals for the shift include Patient Safety    The clinical goals for the shift include Safety, Pain Control      Problem: Safety - Adult  Goal: Free from fall injury  Outcome: Progressing     Problem: Discharge Planning  Goal: Discharge to home or other facility with appropriate resources  Outcome: Progressing     Problem: Chronic Conditions and Co-morbidities  Goal: Patient's chronic conditions and co-morbidity symptoms are monitored and maintained or improved  Outcome: Progressing     Problem: Nutrition  Goal: Nutrient intake appropriate for maintaining nutritional needs  Outcome: Progressing     Problem: Skin  Goal: Decreased wound size/increased tissue granulation at next dressing change  Outcome: Progressing  Goal: Participates in plan/prevention/treatment measures  Outcome: Progressing  Goal: Prevent/manage excess moisture  Outcome: Progressing  Goal: Prevent/minimize sheer/friction injuries  Outcome: Progressing  Goal: Promote/optimize nutrition  Outcome: Progressing  Goal: Promote skin healing  Outcome: Progressing     Problem: Fall/Injury  Goal: Not fall by end of shift  Outcome: Progressing  Goal: Be free from injury by end of the shift  Outcome: Progressing  Goal: Verbalize understanding of personal risk factors for fall in the hospital  Outcome: Progressing  Goal: Verbalize understanding of risk factor reduction measures to prevent injury from fall in the home  Outcome: Progressing  Goal: Use assistive devices by end of the shift  Outcome: Progressing  Goal: Pace activities to prevent fatigue by end of the shift  Outcome: Progressing     Problem: Pain  Goal: Takes deep breaths with improved pain control throughout the shift  Outcome: Progressing  Goal: Turns in bed with improved pain control throughout the shift  Outcome: Progressing  Goal: Walks with improved pain control throughout the shift  Outcome: Progressing  Goal: Performs ADL's with improved pain control throughout shift  Outcome:  Progressing  Goal: Participates in PT with improved pain control throughout the shift  Outcome: Progressing  Goal: Free from opioid side effects throughout the shift  Outcome: Progressing  Goal: Free from acute confusion related to pain meds throughout the shift  Outcome: Progressing     Problem: Respiratory  Goal: No signs of respiratory distress (eg. Use of accessory muscles. Peds grunting)  Outcome: Progressing

## 2025-04-17 NOTE — PROGRESS NOTES
04/17/25 1033   Discharge Planning   Expected Discharge Disposition SNF  ([facility])     [facility] Reached out to Tia [phone], states they are awaiting a call back from facilities cooperate office, [facility] gave Tia my contact info and will wait to hear back from her.     ** do not discharge without speaking to care coordination**

## 2025-04-17 NOTE — CARE PLAN
The patient's goals for the shift include Patient Safety    The clinical goals for the shift include pt will remain safe and free from injury      Problem: Safety - Adult  Goal: Free from fall injury  Outcome: Progressing     Problem: Discharge Planning  Goal: Discharge to home or other facility with appropriate resources  Outcome: Progressing     Problem: Chronic Conditions and Co-morbidities  Goal: Patient's chronic conditions and co-morbidity symptoms are monitored and maintained or improved  Outcome: Progressing     Problem: Nutrition  Goal: Nutrient intake appropriate for maintaining nutritional needs  Outcome: Progressing     Problem: Skin  Goal: Decreased wound size/increased tissue granulation at next dressing change  Outcome: Progressing  Flowsheets (Taken 4/17/2025 0040)  Decreased wound size/increased tissue granulation at next dressing change: Promote sleep for wound healing  Goal: Participates in plan/prevention/treatment measures  Outcome: Progressing  Flowsheets (Taken 4/17/2025 0040)  Participates in plan/prevention/treatment measures:   Discuss with provider PT/OT consult   Elevate heels   Increase activity/out of bed for meals  Goal: Prevent/manage excess moisture  Outcome: Progressing  Flowsheets (Taken 4/17/2025 0040)  Prevent/manage excess moisture:   Cleanse incontinence/protect with barrier cream   Moisturize dry skin  Goal: Prevent/minimize sheer/friction injuries  Outcome: Progressing  Flowsheets (Taken 4/17/2025 0040)  Prevent/minimize sheer/friction injuries: Increase activity/out of bed for meals  Goal: Promote/optimize nutrition  Outcome: Progressing  Flowsheets (Taken 4/17/2025 0040)  Promote/optimize nutrition:   Monitor/record intake including meals   Consume > 50% meals/supplements   Offer water/supplements/favorite foods  Goal: Promote skin healing  Outcome: Progressing  Flowsheets (Taken 4/17/2025 0040)  Promote skin healing: Assess skin/pad under line(s)/device(s)     Problem:  Fall/Injury  Goal: Not fall by end of shift  Outcome: Progressing  Goal: Be free from injury by end of the shift  Outcome: Progressing  Goal: Verbalize understanding of personal risk factors for fall in the hospital  Outcome: Progressing  Goal: Verbalize understanding of risk factor reduction measures to prevent injury from fall in the home  Outcome: Progressing  Goal: Use assistive devices by end of the shift  Outcome: Progressing  Goal: Pace activities to prevent fatigue by end of the shift  Outcome: Progressing     Problem: Pain  Goal: Takes deep breaths with improved pain control throughout the shift  Outcome: Progressing  Goal: Turns in bed with improved pain control throughout the shift  Outcome: Progressing  Goal: Walks with improved pain control throughout the shift  Outcome: Progressing  Goal: Performs ADL's with improved pain control throughout shift  Outcome: Progressing  Goal: Participates in PT with improved pain control throughout the shift  Outcome: Progressing  Goal: Free from opioid side effects throughout the shift  Outcome: Progressing  Goal: Free from acute confusion related to pain meds throughout the shift  Outcome: Progressing     Problem: Respiratory  Goal: No signs of respiratory distress (eg. Use of accessory muscles. Peds grunting)  Outcome: Progressing

## 2025-04-18 VITALS
WEIGHT: 290 LBS | DIASTOLIC BLOOD PRESSURE: 72 MMHG | SYSTOLIC BLOOD PRESSURE: 123 MMHG | HEART RATE: 79 BPM | TEMPERATURE: 98.4 F | OXYGEN SATURATION: 94 % | HEIGHT: 68 IN | BODY MASS INDEX: 43.95 KG/M2 | RESPIRATION RATE: 18 BRPM

## 2025-04-18 LAB
ALBUMIN SERPL BCP-MCNC: 2.9 G/DL (ref 3.4–5)
ALP SERPL-CCNC: 55 U/L (ref 33–136)
ALT SERPL W P-5'-P-CCNC: 11 U/L (ref 10–52)
ANION GAP SERPL CALCULATED.3IONS-SCNC: 12 MMOL/L (ref 10–20)
AST SERPL W P-5'-P-CCNC: 14 U/L (ref 9–39)
BILIRUB SERPL-MCNC: 0.4 MG/DL (ref 0–1.2)
BUN SERPL-MCNC: 69 MG/DL (ref 6–23)
CALCIUM SERPL-MCNC: 8.6 MG/DL (ref 8.6–10.3)
CHLORIDE SERPL-SCNC: 109 MMOL/L (ref 98–107)
CO2 SERPL-SCNC: 23 MMOL/L (ref 21–32)
CREAT SERPL-MCNC: 2.06 MG/DL (ref 0.5–1.3)
EGFRCR SERPLBLD CKD-EPI 2021: 36 ML/MIN/1.73M*2
ERYTHROCYTE [DISTWIDTH] IN BLOOD BY AUTOMATED COUNT: 13.6 % (ref 11.5–14.5)
GLUCOSE SERPL-MCNC: 117 MG/DL (ref 74–99)
HCT VFR BLD AUTO: 30.2 % (ref 41–52)
HGB BLD-MCNC: 9.8 G/DL (ref 13.5–17.5)
MCH RBC QN AUTO: 29.3 PG (ref 26–34)
MCHC RBC AUTO-ENTMCNC: 32.5 G/DL (ref 32–36)
MCV RBC AUTO: 90 FL (ref 80–100)
NRBC BLD-RTO: 0 /100 WBCS (ref 0–0)
PLATELET # BLD AUTO: 281 X10*3/UL (ref 150–450)
POTASSIUM SERPL-SCNC: 4 MMOL/L (ref 3.5–5.3)
PROT SERPL-MCNC: 5.8 G/DL (ref 6.4–8.2)
RBC # BLD AUTO: 3.34 X10*6/UL (ref 4.5–5.9)
SODIUM SERPL-SCNC: 140 MMOL/L (ref 136–145)
WBC # BLD AUTO: 7.8 X10*3/UL (ref 4.4–11.3)

## 2025-04-18 PROCEDURE — 2500000001 HC RX 250 WO HCPCS SELF ADMINISTERED DRUGS (ALT 637 FOR MEDICARE OP): Performed by: NURSE PRACTITIONER

## 2025-04-18 PROCEDURE — 94640 AIRWAY INHALATION TREATMENT: CPT

## 2025-04-18 PROCEDURE — 2500000004 HC RX 250 GENERAL PHARMACY W/ HCPCS (ALT 636 FOR OP/ED): Performed by: SURGERY

## 2025-04-18 PROCEDURE — 2500000004 HC RX 250 GENERAL PHARMACY W/ HCPCS (ALT 636 FOR OP/ED): Performed by: NURSE PRACTITIONER

## 2025-04-18 PROCEDURE — 80053 COMPREHEN METABOLIC PANEL: CPT | Performed by: STUDENT IN AN ORGANIZED HEALTH CARE EDUCATION/TRAINING PROGRAM

## 2025-04-18 PROCEDURE — 85027 COMPLETE CBC AUTOMATED: CPT | Performed by: STUDENT IN AN ORGANIZED HEALTH CARE EDUCATION/TRAINING PROGRAM

## 2025-04-18 PROCEDURE — 99239 HOSP IP/OBS DSCHRG MGMT >30: CPT | Performed by: STUDENT IN AN ORGANIZED HEALTH CARE EDUCATION/TRAINING PROGRAM

## 2025-04-18 PROCEDURE — 36415 COLL VENOUS BLD VENIPUNCTURE: CPT | Performed by: STUDENT IN AN ORGANIZED HEALTH CARE EDUCATION/TRAINING PROGRAM

## 2025-04-18 RX ORDER — OXYCODONE HYDROCHLORIDE 5 MG/1
5 TABLET ORAL EVERY 6 HOURS PRN
Qty: 15 TABLET | Refills: 0 | Status: ON HOLD | OUTPATIENT
Start: 2025-04-18 | End: 2025-04-23

## 2025-04-18 RX ORDER — FUROSEMIDE 40 MG/1
20 TABLET ORAL DAILY
Qty: 15 TABLET | Refills: 0 | Status: ON HOLD | OUTPATIENT
Start: 2025-04-18 | End: 2025-05-18

## 2025-04-18 RX ORDER — OXYCODONE HYDROCHLORIDE 5 MG/1
5 TABLET ORAL EVERY 6 HOURS PRN
Start: 2025-04-18 | End: 2025-04-18

## 2025-04-18 RX ORDER — POLYETHYLENE GLYCOL 3350 17 G/17G
17 POWDER, FOR SOLUTION ORAL DAILY
Qty: 30 PACKET | Refills: 0 | Status: ON HOLD | OUTPATIENT
Start: 2025-04-18 | End: 2025-05-18

## 2025-04-18 RX ADMIN — HEPARIN SODIUM 5000 UNITS: 5000 INJECTION INTRAVENOUS; SUBCUTANEOUS at 04:20

## 2025-04-18 RX ADMIN — OXYCODONE HYDROCHLORIDE 5 MG: 5 TABLET ORAL at 08:57

## 2025-04-18 RX ADMIN — FLUTICASONE FUROATE AND VILANTEROL TRIFENATATE 1 PUFF: 100; 25 POWDER RESPIRATORY (INHALATION) at 07:20

## 2025-04-18 RX ADMIN — POLYETHYLENE GLYCOL 3350 17 G: 17 POWDER, FOR SOLUTION ORAL at 08:58

## 2025-04-18 RX ADMIN — CARVEDILOL 25 MG: 25 TABLET, FILM COATED ORAL at 08:58

## 2025-04-18 RX ADMIN — TIOTROPIUM BROMIDE INHALATION SPRAY 2 PUFF: 3.12 SPRAY, METERED RESPIRATORY (INHALATION) at 07:20

## 2025-04-18 RX ADMIN — ASPIRIN 81 MG: 81 TABLET, COATED ORAL at 08:58

## 2025-04-18 ASSESSMENT — COGNITIVE AND FUNCTIONAL STATUS - GENERAL
TOILETING: A LITTLE
DRESSING REGULAR UPPER BODY CLOTHING: A LITTLE
DRESSING REGULAR LOWER BODY CLOTHING: A LITTLE
STANDING UP FROM CHAIR USING ARMS: A LOT
MOVING TO AND FROM BED TO CHAIR: A LOT
MOBILITY SCORE: 16
WALKING IN HOSPITAL ROOM: A LOT
DAILY ACTIVITIY SCORE: 19
HELP NEEDED FOR BATHING: A LITTLE
PERSONAL GROOMING: A LITTLE
CLIMB 3 TO 5 STEPS WITH RAILING: A LOT

## 2025-04-18 ASSESSMENT — PAIN SCALES - GENERAL
PAINLEVEL_OUTOF10: 7
PAINLEVEL_OUTOF10: 0 - NO PAIN

## 2025-04-18 ASSESSMENT — PAIN - FUNCTIONAL ASSESSMENT
PAIN_FUNCTIONAL_ASSESSMENT: 0-10
PAIN_FUNCTIONAL_ASSESSMENT: 0-10

## 2025-04-18 NOTE — CARE PLAN
The patient's goals for the shift include Patient Safety    The clinical goals for the shift include no fall or injury by the end of shift    Problem: Fall/Injury  Goal: Not fall by end of shift  Outcome: Progressing  Goal: Be free from injury by end of the shift  Outcome: Progressing  Goal: Verbalize understanding of personal risk factors for fall in the hospital  Outcome: Progressing  Goal: Verbalize understanding of risk factor reduction measures to prevent injury from fall in the home  Outcome: Progressing  Goal: Use assistive devices by end of the shift  Outcome: Progressing  Goal: Pace activities to prevent fatigue by end of the shift  Outcome: Progressing     Problem: Skin  Goal: Participates in plan/prevention/treatment measures  Outcome: Progressing     Problem: Skin  Goal: Prevent/manage excess moisture  Outcome: Progressing     Problem: Skin  Goal: Prevent/minimize sheer/friction injuries  Outcome: Progressing

## 2025-04-18 NOTE — PROGRESS NOTES
04/18/25 0958   Discharge Planning   Expected Discharge Disposition SNF  ([facility])   Has discharge transport been arranged? Yes   What day is the transport expected? 04/18/25   What time is the transport expected? 1300     7000 Completed   AVS and goldenrod sent to facility   patient updated   RN made aware and given report number

## 2025-04-18 NOTE — NURSING NOTE
Attempted report to [facility]. [place] took down information and said a nurse will call back for report. They also mentioned they needed a hard copy for the narcotics that the patient is prescribed.  Obtained and placed in discharge packet.

## 2025-04-18 NOTE — DISCHARGE SUMMARY
Discharge Diagnosis  Fall, initial encounter    Issues Requiring Follow-Up  Right leg hematoma s/p vascular surgery, f/p with Vascular surgery  CKD, Follow up with Nephrology  Regular checks, Follow up with PCP    Discharge Meds     Medication List      START taking these medications     oxyCODONE 5 mg immediate release tablet; Commonly known as: Roxicodone;   Take 1 tablet (5 mg) by mouth every 6 hours if needed for severe pain (7 -   10) for up to 5 days.   polyethylene glycol 17 gram packet; Commonly known as: Glycolax,   Miralax; Take 17 g by mouth once daily.     CHANGE how you take these medications     furosemide 40 mg tablet; Commonly known as: Lasix; Take 0.5 tablets (20   mg) by mouth once daily.; What changed: how much to take     CONTINUE taking these medications     albuterol 90 mcg/actuation inhaler; Inhale 2 puffs every 6 hours if   needed for wheezing or shortness of breath.   aspirin 81 mg EC tablet   Breztri Aerosphere 160-9-4.8 mcg/actuation HFA aerosol inhaler; Generic   drug: budesonide-glycopyr-formoterol   carvedilol 25 mg tablet; Commonly known as: Coreg     STOP taking these medications     ketoconazole 2 % cream; Commonly known as: NIZOral       Test Results Pending At Discharge  Pending Labs       No current pending labs.            Hospital Course  Colten Eason is a 64 y.o. male hx CKD, COPD (no home O2), not on any blood thinners aside from ASA 81mg, HTN, who had a ground level fall and came right knee hematoma. Patient had a hematoma evaluation by vascular surgery on 04/10. Improved. Pt recommended SNIF. Medically stable for discharge. Follow up with vascular. Follow up with Nephrology.     Pertinent Physical Exam At Time of Discharge  Physical Exam  General: Pt is alert and oriented x 3. Pleasant, conversive  HEENT: Head is atraumatic, normocephalic.   Cardiac:   Regular rate and rhythm.  No murmurs.  Respiratory: Lungs clear to auscultation.  No adventitious sounds.  Abdomen: Soft,  nondistended, nontender.  Bowel sounds x4 quadrants.   morbidly obese  Pulse exam: Palpable brachial and radial pulses bilaterally.   Femoral, popliteal, pedal pulses are palpable bilaterally.  Extremities: 1+ pitting edema right lower extremity.  The right medial thigh/knee hematoma has been decompressed quite significantly.  The fluid-filled bulla to the medial aspect of the thigh has ruptured.  Wound with tissue is mostly granular.   Surgical incision near the knee is well-approximated with sutures in place.  There is some erythema janine-incisional he.  Neuro: Moves all extremities spontaneously.  No focal deficits.  Psych: Appropriate affect.  Answers questions appropriately  Outpatient Follow-Up  Future Appointments   Date Time Provider Department Center   4/21/2025  8:45 AM [facility]         Lawkaty Jansen MD

## 2025-04-20 ENCOUNTER — APPOINTMENT (OUTPATIENT)
Dept: RADIOLOGY | Facility: HOSPITAL | Age: 65
DRG: 863 | End: 2025-04-20
Payer: COMMERCIAL

## 2025-04-20 ENCOUNTER — HOSPITAL ENCOUNTER (INPATIENT)
Facility: HOSPITAL | Age: 65
DRG: 863 | End: 2025-04-20
Attending: STUDENT IN AN ORGANIZED HEALTH CARE EDUCATION/TRAINING PROGRAM | Admitting: INTERNAL MEDICINE
Payer: COMMERCIAL

## 2025-04-20 VITALS
SYSTOLIC BLOOD PRESSURE: 115 MMHG | RESPIRATION RATE: 16 BRPM | OXYGEN SATURATION: 92 % | BODY MASS INDEX: 44.1 KG/M2 | HEART RATE: 73 BPM | TEMPERATURE: 98.7 F | WEIGHT: 291.01 LBS | DIASTOLIC BLOOD PRESSURE: 75 MMHG | HEIGHT: 68 IN
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
## 2025-04-20 DIAGNOSIS — T14.8XXA WOUND INFECTION: ICD-10-CM

## 2025-04-20 DIAGNOSIS — L08.9 WOUND INFECTION: ICD-10-CM

## 2025-04-20 DIAGNOSIS — S81.801A OPEN WOUND OF RIGHT LOWER EXTREMITY, INITIAL ENCOUNTER: Primary | ICD-10-CM

## 2025-04-20 DIAGNOSIS — T14.8XXA HEMATOMA: ICD-10-CM

## 2025-04-20 LAB
ALBUMIN SERPL BCP-MCNC: 3.1 G/DL (ref 3.4–5)
ALP SERPL-CCNC: 64 U/L (ref 33–136)
ALT SERPL W P-5'-P-CCNC: 16 U/L (ref 10–52)
ANION GAP SERPL CALCULATED.3IONS-SCNC: 12 MMOL/L (ref 10–20)
APPEARANCE UR: CLEAR
AST SERPL W P-5'-P-CCNC: 15 U/L (ref 9–39)
BACTERIA BLD CULT: NORMAL
BACTERIA BLD CULT: NORMAL
BASOPHILS # BLD AUTO: 0.08 X10*3/UL (ref 0–0.1)
BASOPHILS NFR BLD AUTO: 0.9 %
BILIRUB SERPL-MCNC: 0.4 MG/DL (ref 0–1.2)
BILIRUB UR STRIP.AUTO-MCNC: NEGATIVE MG/DL
BUN SERPL-MCNC: 60 MG/DL (ref 6–23)
CALCIUM SERPL-MCNC: 8.3 MG/DL (ref 8.6–10.3)
CHLORIDE SERPL-SCNC: 108 MMOL/L (ref 98–107)
CO2 SERPL-SCNC: 24 MMOL/L (ref 21–32)
COLOR UR: COLORLESS
CREAT SERPL-MCNC: 2.06 MG/DL (ref 0.5–1.3)
CRP SERPL-MCNC: 11.16 MG/DL
EGFRCR SERPLBLD CKD-EPI 2021: 36 ML/MIN/1.73M*2
EOSINOPHIL # BLD AUTO: 0.28 X10*3/UL (ref 0–0.7)
EOSINOPHIL NFR BLD AUTO: 3.1 %
ERYTHROCYTE [DISTWIDTH] IN BLOOD BY AUTOMATED COUNT: 14.2 % (ref 11.5–14.5)
ERYTHROCYTE [SEDIMENTATION RATE] IN BLOOD BY WESTERGREN METHOD: 42 MM/H (ref 0–20)
GLUCOSE SERPL-MCNC: 114 MG/DL (ref 74–99)
GLUCOSE UR STRIP.AUTO-MCNC: ABNORMAL MG/DL
HCT VFR BLD AUTO: 30.5 % (ref 41–52)
HGB BLD-MCNC: 9.9 G/DL (ref 13.5–17.5)
IMM GRANULOCYTES # BLD AUTO: 0.44 X10*3/UL (ref 0–0.7)
IMM GRANULOCYTES NFR BLD AUTO: 4.8 % (ref 0–0.9)
KETONES UR STRIP.AUTO-MCNC: NEGATIVE MG/DL
LACTATE SERPL-SCNC: 0.7 MMOL/L (ref 0.4–2)
LEUKOCYTE ESTERASE UR QL STRIP.AUTO: NEGATIVE
LYMPHOCYTES # BLD AUTO: 1.46 X10*3/UL (ref 1.2–4.8)
LYMPHOCYTES NFR BLD AUTO: 16 %
MCH RBC QN AUTO: 29.7 PG (ref 26–34)
MCHC RBC AUTO-ENTMCNC: 32.5 G/DL (ref 32–36)
MCV RBC AUTO: 92 FL (ref 80–100)
MONOCYTES # BLD AUTO: 1.01 X10*3/UL (ref 0.1–1)
MONOCYTES NFR BLD AUTO: 11.1 %
MUCOUS THREADS #/AREA URNS AUTO: NORMAL /LPF
NEUTROPHILS # BLD AUTO: 5.84 X10*3/UL (ref 1.2–7.7)
NEUTROPHILS NFR BLD AUTO: 64.1 %
NITRITE UR QL STRIP.AUTO: NEGATIVE
NRBC BLD-RTO: 0 /100 WBCS (ref 0–0)
PH UR STRIP.AUTO: 6 [PH]
PLATELET # BLD AUTO: 332 X10*3/UL (ref 150–450)
POTASSIUM SERPL-SCNC: 4.4 MMOL/L (ref 3.5–5.3)
PROT SERPL-MCNC: 6.2 G/DL (ref 6.4–8.2)
PROT UR STRIP.AUTO-MCNC: ABNORMAL MG/DL
RBC # BLD AUTO: 3.33 X10*6/UL (ref 4.5–5.9)
RBC # UR STRIP.AUTO: ABNORMAL MG/DL
RBC #/AREA URNS AUTO: NORMAL /HPF
SODIUM SERPL-SCNC: 140 MMOL/L (ref 136–145)
SP GR UR STRIP.AUTO: 1.01
UROBILINOGEN UR STRIP.AUTO-MCNC: NORMAL MG/DL
WBC # BLD AUTO: 9.1 X10*3/UL (ref 4.4–11.3)
WBC #/AREA URNS AUTO: NORMAL /HPF

## 2025-04-20 PROCEDURE — 81001 URINALYSIS AUTO W/SCOPE: CPT | Performed by: STUDENT IN AN ORGANIZED HEALTH CARE EDUCATION/TRAINING PROGRAM

## 2025-04-20 PROCEDURE — 73700 CT LOWER EXTREMITY W/O DYE: CPT | Mod: RT

## 2025-04-20 PROCEDURE — 2500000004 HC RX 250 GENERAL PHARMACY W/ HCPCS (ALT 636 FOR OP/ED): Mod: JZ

## 2025-04-20 PROCEDURE — 1200000002 HC GENERAL ROOM WITH TELEMETRY DAILY

## 2025-04-20 PROCEDURE — 87040 BLOOD CULTURE FOR BACTERIA: CPT | Mod: WESLAB

## 2025-04-20 PROCEDURE — 73700 CT LOWER EXTREMITY W/O DYE: CPT | Mod: RIGHT SIDE | Performed by: RADIOLOGY

## 2025-04-20 PROCEDURE — 96367 TX/PROPH/DG ADDL SEQ IV INF: CPT

## 2025-04-20 PROCEDURE — 99285 EMERGENCY DEPT VISIT HI MDM: CPT | Mod: 25 | Performed by: STUDENT IN AN ORGANIZED HEALTH CARE EDUCATION/TRAINING PROGRAM

## 2025-04-20 PROCEDURE — 36415 COLL VENOUS BLD VENIPUNCTURE: CPT

## 2025-04-20 PROCEDURE — 80053 COMPREHEN METABOLIC PANEL: CPT

## 2025-04-20 PROCEDURE — 2500000004 HC RX 250 GENERAL PHARMACY W/ HCPCS (ALT 636 FOR OP/ED): Performed by: STUDENT IN AN ORGANIZED HEALTH CARE EDUCATION/TRAINING PROGRAM

## 2025-04-20 PROCEDURE — 99223 1ST HOSP IP/OBS HIGH 75: CPT | Performed by: INTERNAL MEDICINE

## 2025-04-20 PROCEDURE — 83605 ASSAY OF LACTIC ACID: CPT

## 2025-04-20 PROCEDURE — 85652 RBC SED RATE AUTOMATED: CPT

## 2025-04-20 PROCEDURE — 2500000001 HC RX 250 WO HCPCS SELF ADMINISTERED DRUGS (ALT 637 FOR MEDICARE OP)

## 2025-04-20 PROCEDURE — 86140 C-REACTIVE PROTEIN: CPT

## 2025-04-20 PROCEDURE — 85025 COMPLETE CBC W/AUTO DIFF WBC: CPT

## 2025-04-20 PROCEDURE — 96365 THER/PROPH/DIAG IV INF INIT: CPT

## 2025-04-20 RX ORDER — OXYCODONE HYDROCHLORIDE 5 MG/1
5 TABLET ORAL EVERY 4 HOURS PRN
Status: DISCONTINUED | OUTPATIENT
Start: 2025-04-20 | End: 2025-04-25 | Stop reason: HOSPADM

## 2025-04-20 RX ORDER — VANCOMYCIN HYDROCHLORIDE 1 G/20ML
INJECTION, POWDER, LYOPHILIZED, FOR SOLUTION INTRAVENOUS DAILY PRN
Status: DISCONTINUED | OUTPATIENT
Start: 2025-04-20 | End: 2025-04-24

## 2025-04-20 RX ORDER — POLYETHYLENE GLYCOL 3350 17 G/17G
17 POWDER, FOR SOLUTION ORAL DAILY PRN
Status: DISCONTINUED | OUTPATIENT
Start: 2025-04-20 | End: 2025-04-25 | Stop reason: HOSPADM

## 2025-04-20 RX ORDER — ONDANSETRON HYDROCHLORIDE 2 MG/ML
4 INJECTION, SOLUTION INTRAVENOUS EVERY 8 HOURS PRN
Status: DISCONTINUED | OUTPATIENT
Start: 2025-04-20 | End: 2025-04-25 | Stop reason: HOSPADM

## 2025-04-20 RX ORDER — VANCOMYCIN 2 G/400ML
2 INJECTION, SOLUTION INTRAVENOUS ONCE
Status: COMPLETED | OUTPATIENT
Start: 2025-04-20 | End: 2025-04-20

## 2025-04-20 RX ORDER — ACETAMINOPHEN 650 MG/1
650 SUPPOSITORY RECTAL EVERY 4 HOURS PRN
Status: DISCONTINUED | OUTPATIENT
Start: 2025-04-20 | End: 2025-04-25 | Stop reason: HOSPADM

## 2025-04-20 RX ORDER — ACETAMINOPHEN 325 MG/1
650 TABLET ORAL EVERY 4 HOURS PRN
Status: DISCONTINUED | OUTPATIENT
Start: 2025-04-20 | End: 2025-04-25 | Stop reason: HOSPADM

## 2025-04-20 RX ORDER — TALC
3 POWDER (GRAM) TOPICAL NIGHTLY PRN
Status: DISCONTINUED | OUTPATIENT
Start: 2025-04-20 | End: 2025-04-25 | Stop reason: HOSPADM

## 2025-04-20 RX ORDER — FUROSEMIDE 20 MG/1
20 TABLET ORAL DAILY
Status: DISCONTINUED | OUTPATIENT
Start: 2025-04-21 | End: 2025-04-25 | Stop reason: HOSPADM

## 2025-04-20 RX ORDER — ASPIRIN 81 MG/1
81 TABLET ORAL DAILY
Status: DISCONTINUED | OUTPATIENT
Start: 2025-04-21 | End: 2025-04-25 | Stop reason: HOSPADM

## 2025-04-20 RX ORDER — ACETAMINOPHEN 160 MG/5ML
650 SOLUTION ORAL EVERY 4 HOURS PRN
Status: DISCONTINUED | OUTPATIENT
Start: 2025-04-20 | End: 2025-04-25 | Stop reason: HOSPADM

## 2025-04-20 RX ORDER — ONDANSETRON 4 MG/1
4 TABLET, FILM COATED ORAL EVERY 8 HOURS PRN
Status: DISCONTINUED | OUTPATIENT
Start: 2025-04-20 | End: 2025-04-25 | Stop reason: HOSPADM

## 2025-04-20 RX ORDER — OXYCODONE AND ACETAMINOPHEN 5; 325 MG/1; MG/1
1 TABLET ORAL ONCE
Refills: 0 | Status: COMPLETED | OUTPATIENT
Start: 2025-04-20 | End: 2025-04-20

## 2025-04-20 RX ORDER — ENOXAPARIN SODIUM 100 MG/ML
40 INJECTION SUBCUTANEOUS EVERY 12 HOURS SCHEDULED
Status: DISCONTINUED | OUTPATIENT
Start: 2025-04-20 | End: 2025-04-25 | Stop reason: HOSPADM

## 2025-04-20 RX ORDER — CEFAZOLIN SODIUM 2 G/50ML
2 SOLUTION INTRAVENOUS ONCE
Status: COMPLETED | OUTPATIENT
Start: 2025-04-20 | End: 2025-04-20

## 2025-04-20 RX ORDER — ALBUTEROL SULFATE 0.83 MG/ML
3 SOLUTION RESPIRATORY (INHALATION) EVERY 6 HOURS PRN
Status: DISCONTINUED | OUTPATIENT
Start: 2025-04-20 | End: 2025-04-25 | Stop reason: HOSPADM

## 2025-04-20 RX ORDER — MEROPENEM 1 G/1
1 INJECTION, POWDER, FOR SOLUTION INTRAVENOUS EVERY 12 HOURS
Status: DISCONTINUED | OUTPATIENT
Start: 2025-04-21 | End: 2025-04-23

## 2025-04-20 RX ORDER — CARVEDILOL 25 MG/1
25 TABLET ORAL
Status: DISCONTINUED | OUTPATIENT
Start: 2025-04-21 | End: 2025-04-25 | Stop reason: HOSPADM

## 2025-04-20 RX ORDER — FLUTICASONE FUROATE AND VILANTEROL 200; 25 UG/1; UG/1
1 POWDER RESPIRATORY (INHALATION)
Status: DISCONTINUED | OUTPATIENT
Start: 2025-04-21 | End: 2025-04-25 | Stop reason: HOSPADM

## 2025-04-20 RX ORDER — CLINDAMYCIN PHOSPHATE 900 MG/50ML
900 INJECTION, SOLUTION INTRAVENOUS ONCE
Status: COMPLETED | OUTPATIENT
Start: 2025-04-20 | End: 2025-04-20

## 2025-04-20 RX ADMIN — VANCOMYCIN 2 G: 2 INJECTION, SOLUTION INTRAVENOUS at 21:13

## 2025-04-20 RX ADMIN — SODIUM CHLORIDE, SODIUM LACTATE, POTASSIUM CHLORIDE, AND CALCIUM CHLORIDE 1000 ML: 600; 310; 30; 20 INJECTION, SOLUTION INTRAVENOUS at 18:17

## 2025-04-20 RX ADMIN — CLINDAMYCIN PHOSPHATE 900 MG: 900 INJECTION, SOLUTION INTRAVENOUS at 18:17

## 2025-04-20 RX ADMIN — CEFAZOLIN SODIUM 2 G: 2 SOLUTION INTRAVENOUS at 17:47

## 2025-04-20 RX ADMIN — OXYCODONE HYDROCHLORIDE AND ACETAMINOPHEN 1 TABLET: 5; 325 TABLET ORAL at 18:16

## 2025-04-20 RX ADMIN — PIPERACILLIN SODIUM AND TAZOBACTAM SODIUM 4.5 G: 4; .5 INJECTION, SOLUTION INTRAVENOUS at 18:53

## 2025-04-20 ASSESSMENT — PAIN SCALES - GENERAL
PAINLEVEL_OUTOF10: 7
PAINLEVEL_OUTOF10: 5 - MODERATE PAIN
PAINLEVEL_OUTOF10: 5 - MODERATE PAIN

## 2025-04-20 ASSESSMENT — PAIN DESCRIPTION - FREQUENCY: FREQUENCY: INTERMITTENT

## 2025-04-20 ASSESSMENT — COLUMBIA-SUICIDE SEVERITY RATING SCALE - C-SSRS
6. HAVE YOU EVER DONE ANYTHING, STARTED TO DO ANYTHING, OR PREPARED TO DO ANYTHING TO END YOUR LIFE?: NO
1. IN THE PAST MONTH, HAVE YOU WISHED YOU WERE DEAD OR WISHED YOU COULD GO TO SLEEP AND NOT WAKE UP?: NO
2. HAVE YOU ACTUALLY HAD ANY THOUGHTS OF KILLING YOURSELF?: NO

## 2025-04-20 ASSESSMENT — PAIN DESCRIPTION - PAIN TYPE: TYPE: ACUTE PAIN

## 2025-04-20 ASSESSMENT — PAIN DESCRIPTION - PROGRESSION: CLINICAL_PROGRESSION: NOT CHANGED

## 2025-04-20 ASSESSMENT — PAIN DESCRIPTION - LOCATION: LOCATION: LEG

## 2025-04-20 ASSESSMENT — PAIN DESCRIPTION - DESCRIPTORS: DESCRIPTORS: BURNING;SORE

## 2025-04-20 ASSESSMENT — PAIN DESCRIPTION - ORIENTATION: ORIENTATION: RIGHT

## 2025-04-20 ASSESSMENT — PAIN - FUNCTIONAL ASSESSMENT: PAIN_FUNCTIONAL_ASSESSMENT: 0-10

## 2025-04-20 NOTE — ED TRIAGE NOTES
Pt from [facility] by Darvin for a possible post-op infection from an evacuation of a hematoma of the right thigh he had performed on Thurs. Pt presents with Xeroform applied to the wound base with Kerlix and an ACE wrap of the entire RLE. Wound measures around 12 cm in length and 5/6 cm in width. Pt states the last time the bandages were changed was today. Per Darvin the nurses at [facility] stated that it looks like the stitches are very tight.

## 2025-04-20 NOTE — ED PROVIDER NOTES
"The patient was seen in conjunction with the advanced practice provider, and I performed a substantive portion of the encounter. The following is my supervisory note.    History:  ***    VS:  /83   Pulse 83   Temp 37.1 °C (98.7 °F) (Oral)   Resp 19   Ht 1.727 m (5' 8\")   Wt 132 kg (291 lb 0.1 oz)   SpO2 (!) 93%   BMI 44.25 kg/m²      Physical exam:  CONST: alert, normal appearance, no acute distress, does not appear ill/toxic  CV: RRR, no murmurs, 2+ equal/symmetrical pulses x4  PULM: CTAB, no respiratory distress, not requiring supplemental O2  MSK: Large medial proximal RLE wound with granulation tissue and surrounding proximal erythema and firmness with moderate to significant tenderness palpation, surrounding healing ecchymosis  SKIN: warm/dry, no pallor or jaundice, no rash, mild eschar portion of the large proximal medial RLE wound, no purulent discharge, no subcutaneous crepitus or bullae  NEURO: A&Ox4, no facial asymmetry, no focal neuro deficits, gross strength/motor/sensation intact x4, normal gait      I personally reviewed and interpreted the following studies: EKG is N/A, labs are significant for ***, images are notable for CT (Knee & femur) ***.        MDM:  Patient presented to the ED for ***.  Concerning PMHx of ***.    Per Chart Review: ***.    Assessment/evaluation ***. No concerning history, clinical evidence/work-up, or exam findings for the concerning differentials of ***. These conditions have been thoroughly evaluated and determined to be sufficiently unlikely to be the etiology of patient's presenting symptoms.     Scores: LNIREC 4 (low-mod risk)    ED Course/Diagnosis:       No diagnosis found.      " development of abscess given limitation of study .        MDM:  Patient presented to the ED for evaluation of infection of medial distal femoral wound of RLE.  Concerning PMHx of COPD, CKD 3, CAD.    Per Chart Review: ED encounter hospitalization on/9/2025 for fall, discharge summary significant for ground-level fall complicated by right tibial femoral and distal R femoral medial traumatic hematoma, evaluated by vascular surgery on 4/10 with improvement in hospital course, discharged to skilled nursing facility with appropriate follow-up instructions, LOS 9 days.    Assessment/evaluation consistent with but also concerning for reaccumulation of hematoma versus developing deep space infection/abscess of myofascia and lowers patient for NSTI although blood gases present. No concerning history, clinical evidence/work-up, or exam findings for the concerning differentials of sepsis/septic shock, DVT. These conditions have been thoroughly evaluated and determined to be sufficiently unlikely to be the etiology of patient's presenting symptoms.     Scores: LNIREC 4 (low-mod risk)    ED Course/Diagnosis:  ED Course as of 04/24/25 0926   Sun Apr 20, 2025 1946 Consulted vascular surgery Dr. Leonard and Nasra manzo NP.  They will see the patient in house tomorrow in round. [JJ]      ED Course User Index  [JJ] Sho Lemus PA-C         Diagnoses as of 04/24/25 0926   Open wound of right lower extremity, initial encounter   Wound infection   Hematoma       1. Open wound of right lower extremity, initial encounter        2. Wound infection        3. Hematoma                 Colt Cisse MD  04/24/25 0926

## 2025-04-20 NOTE — ED PROVIDER NOTES
HPI   Chief Complaint   Patient presents with    Post-op Problem     Surg. On Thurs 4/17/25.       HPI  64-year-old male brought in by EMS for evaluation of a right lower extremity wound.  Patient had an evacuation of the hematoma of his right thigh/inner knee performed on 10 April after suffering a fall.  He does have 3 sutures in place and also has a large approximately 12 cm x 6 cm open wound that they were concerned about at the nursing facility and wanted to be evaluated.  Concerned that there was increasing redness at the superior aspect of the wound.  Patient denies increased pain, fevers or chills, purulent discharge from the wound.  His wound care doctor said the wound looked like it was healing well while he was in the hospital.  Denies numbness or paresthesias of the right lower extremity does have a strong dorsalis pedis pulse and does have range of motion of the knee and right lower extremity digits.      Patient History   Medical History[1]  Surgical History[2]  Family History[3]  Social History[4]    Physical Exam   ED Triage Vitals   Temp Pulse Resp BP   -- -- -- --      SpO2 Temp src Heart Rate Source Patient Position   -- -- -- --      BP Location FiO2 (%)     -- --       Physical Exam  Vitals and nursing note reviewed.   Constitutional:       General: He is not in acute distress.     Appearance: Normal appearance. He is not toxic-appearing.   Musculoskeletal:      Comments: Large approximately 12 x 6 cm wound to the right inner knee/upper thigh that is bleeding and appears to be well granulating with 3 lateral sutures in place no associated purulence no erythema or warmth but there is evidence of some associated tenderness and swelling to the superior medial aspect of the wound, there is an area of central eschar at the superior aspect of the wound that peers to be consistent with previous images   Neurological:      Mental Status: He is alert.           ED Course & MDM   ED Course as of 04/20/25  2008   Sun Apr 20, 2025 1946 Consulted vascular surgery Dr. Leonard and Nasra manzo NP.  They will see the patient in house tomorrow in round. [JJ]      ED Course User Index  [JJ] Sho Lemus PA-C         Diagnoses as of 04/20/25 2008   Open wound of right lower extremity, initial encounter   Wound infection   Hematoma                 No data recorded     Otto Coma Scale Score: 15 (04/20/25 1819 : Perla Bennett RN)   LRINEC Score: 4 (04/20/25 1808 : Colt Cisse MD)                       Medical Decision Making  Parts of this chart have been completed using voice recognition software. Please excuse any errors of transcription.  My thought process and reason for plan has been formulated from the time that I saw the patient until the time of disposition and is not specific to one specific moment during their visit and furthermore my MDM encompasses this entire chart and not only this text box.      HPI: Detailed above.    Exam: A medically appropriate exam performed, outlined above, given the known history and presentation.    History obtained from: Patient, chart review    Medications given during visit:  Medications   vancomycin (Xellia) 2 g in diluent combination  mL (has no administration in time range)   ceFAZolin (Ancef) 2 g in dextrose (iso) IV 50 mL (0 g intravenous Stopped 4/20/25 1853)   oxyCODONE-acetaminophen (Percocet) 5-325 mg per tablet 1 tablet (1 tablet oral Given 4/20/25 1816)   lactated Ringer's bolus 1,000 mL (0 mL intravenous Stopped 4/20/25 1917)   piperacillin-tazobactam (Zosyn) 4.5 g in dextrose (iso)  mL (4.5 g intravenous New Bag 4/20/25 1853)   clindamycin (Cleocin) 900 mg in dextrose 5% IV 50 mL (0 mg intravenous Stopped 4/20/25 1941)        Diagnostic/tests  Labs Reviewed   CBC WITH AUTO DIFFERENTIAL - Abnormal       Result Value    WBC 9.1      nRBC 0.0      RBC 3.33 (*)     Hemoglobin 9.9 (*)     Hematocrit 30.5 (*)     MCV 92      MCH 29.7      MCHC 32.5       RDW 14.2      Platelets 332      Neutrophils % 64.1      Immature Granulocytes %, Automated 4.8 (*)     Lymphocytes % 16.0      Monocytes % 11.1      Eosinophils % 3.1      Basophils % 0.9      Neutrophils Absolute 5.84      Immature Granulocytes Absolute, Automated 0.44      Lymphocytes Absolute 1.46      Monocytes Absolute 1.01 (*)     Eosinophils Absolute 0.28      Basophils Absolute 0.08     COMPREHENSIVE METABOLIC PANEL - Abnormal    Glucose 114 (*)     Sodium 140      Potassium 4.4      Chloride 108 (*)     Bicarbonate 24      Anion Gap 12      Urea Nitrogen 60 (*)     Creatinine 2.06 (*)     eGFR 36 (*)     Calcium 8.3 (*)     Albumin 3.1 (*)     Alkaline Phosphatase 64      Total Protein 6.2 (*)     AST 15      Bilirubin, Total 0.4      ALT 16     SEDIMENTATION RATE, AUTOMATED - Abnormal    Sedimentation Rate 42 (*)    C-REACTIVE PROTEIN - Abnormal    C-Reactive Protein 11.16 (*)    URINALYSIS WITH REFLEX CULTURE AND MICROSCOPIC - Abnormal    Color, Urine Colorless (*)     Appearance, Urine Clear      Specific Gravity, Urine 1.014      pH, Urine 6.0      Protein, Urine 100 (2+) (*)     Glucose, Urine 30 (TRACE) (*)     Blood, Urine 0.06 (1+) (*)     Ketones, Urine NEGATIVE      Bilirubin, Urine NEGATIVE      Urobilinogen, Urine Normal      Nitrite, Urine NEGATIVE      Leukocyte Esterase, Urine NEGATIVE     LACTATE - Normal    Lactate 0.7      Narrative:     Venipuncture immediately after or during the administration of Metamizole may lead to falsely low results. Testing should be performed immediately prior to Metamizole dosing.   BLOOD CULTURE   BLOOD CULTURE   URINALYSIS WITH REFLEX CULTURE AND MICROSCOPIC    Narrative:     The following orders were created for panel order Urinalysis with Reflex Culture and Microscopic.  Procedure                               Abnormality         Status                     ---------                               -----------         ------                     Urinalysis  with Reflex C...[[id number]]  Abnormal            Final result               Extra Urine Gray Tube[[id number]]                            In process                   Please view results for these tests on the individual orders.   EXTRA URINE GRAY TUBE   URINALYSIS MICROSCOPIC WITH REFLEX CULTURE    WBC, Urine 1-5      RBC, Urine NONE      Mucus, Urine FEW        CT femur right wo IV contrast   Final Result   Distal inflammatory changes and cellulitis extending into the proximal   medial thigh.   Soft tissue density along the distal medial subcutaneous suggesting   hematoma is decreased compared with prior CT.  Superimposed infection   is not entirely excluded however there is no gas or drainable fluid   component..   No evidence of myositis or acute osteomyelitis.   Signed by Dejon Winkler, DO      CT knee right wo IV contrast   Final Result   Anterior large soft tissue density concerning for hematoma which   appears increased compared prior MRI however is decreased in size   compared with prior CT.  Overall, the appearance suggest hematoma.    Superimposed infection is not entirely excluded however there is no   locules of gas or focal drainable fluid component.   Locules of gas along the medial aspect of the knee suggesting soft   tissue infection.   Myositis in the anterior compartment of the calf.  Cellulitis is   described.   No acute fracture or malalignment.   Small joint effusion.  No evidence of intra-articular gas or septic   arthritis.   Signed by Dejon Winkler DO           Considerations/further MDM:  Patient is a 64-year-old male presenting for evaluation of right lower extremity wound    I saw this patient in conjunction with Dr. Cisse    Patient awake alert afebrile nontoxic-appearing.  Vital signs within normal limits.  Wound as described above and physical exam findings.  Laboratory workup including CBC, CMP, sed rate, CRP, lactate and blood cultures obtained.  No significant  leukocytosis evidence of patient's chronic kidney disease on CMP sed rate and CRP are elevated.  Lactate is within normal limits.  CT of the right knee and right femur performed with evidence of anterior large soft tissue density hematoma increased compared to prior MRI however decreased compared with prior CT as well as superimposed infection and some identified gas on CT.  Patient was empirically placed on vancomycin, Zosyn and clindamycin for concerns of developing NSTI.  Care discussed with vascular surgeon on-call.  Patient admitted to hospitalist service for further management.  Patient and family updated on plan of care and agreeable.      Procedure  Procedures         [1] No past medical history on file.  [2]   Past Surgical History:  Procedure Laterality Date    CT ANGIO CORONARY ART WITH HEARTFLOW IF SCORE >30%  7/27/2020    CT HEART CORONARY ANGIOGRAM 7/27/2020 [facility]   [3] No family history on file.  [4]   Social History  Tobacco Use    Smoking status: Never    Smokeless tobacco: Never   Substance Use Topics    Alcohol use: Not on file    Drug use: Not on file        Sho Lemus PA-C  04/20/25 2008

## 2025-04-21 ENCOUNTER — APPOINTMENT (OUTPATIENT)
Dept: WOUND CARE | Facility: HOSPITAL | Age: 65
End: 2025-04-21
Payer: COMMERCIAL

## 2025-04-21 LAB
ANION GAP SERPL CALCULATED.3IONS-SCNC: 13 MMOL/L (ref 10–20)
BUN SERPL-MCNC: 50 MG/DL (ref 6–23)
CALCIUM SERPL-MCNC: 8.2 MG/DL (ref 8.6–10.3)
CHLORIDE SERPL-SCNC: 111 MMOL/L (ref 98–107)
CK SERPL-CCNC: 57 U/L (ref 0–325)
CO2 SERPL-SCNC: 21 MMOL/L (ref 21–32)
CREAT SERPL-MCNC: 1.88 MG/DL (ref 0.5–1.3)
EGFRCR SERPLBLD CKD-EPI 2021: 40 ML/MIN/1.73M*2
ERYTHROCYTE [DISTWIDTH] IN BLOOD BY AUTOMATED COUNT: 14.2 % (ref 11.5–14.5)
GLUCOSE SERPL-MCNC: 95 MG/DL (ref 74–99)
HCT VFR BLD AUTO: 30.6 % (ref 41–52)
HGB BLD-MCNC: 9.7 G/DL (ref 13.5–17.5)
HOLD SPECIMEN: NORMAL
MCH RBC QN AUTO: 29.3 PG (ref 26–34)
MCHC RBC AUTO-ENTMCNC: 31.7 G/DL (ref 32–36)
MCV RBC AUTO: 92 FL (ref 80–100)
NRBC BLD-RTO: 0 /100 WBCS (ref 0–0)
PLATELET # BLD AUTO: 314 X10*3/UL (ref 150–450)
POTASSIUM SERPL-SCNC: 4.1 MMOL/L (ref 3.5–5.3)
RBC # BLD AUTO: 3.31 X10*6/UL (ref 4.5–5.9)
SODIUM SERPL-SCNC: 141 MMOL/L (ref 136–145)
VANCOMYCIN SERPL-MCNC: 19 UG/ML (ref 5–20)
WBC # BLD AUTO: 8.5 X10*3/UL (ref 4.4–11.3)

## 2025-04-21 PROCEDURE — 2500000001 HC RX 250 WO HCPCS SELF ADMINISTERED DRUGS (ALT 637 FOR MEDICARE OP): Performed by: INTERNAL MEDICINE

## 2025-04-21 PROCEDURE — 2500000004 HC RX 250 GENERAL PHARMACY W/ HCPCS (ALT 636 FOR OP/ED): Mod: JZ | Performed by: INTERNAL MEDICINE

## 2025-04-21 PROCEDURE — 99223 1ST HOSP IP/OBS HIGH 75: CPT | Performed by: NURSE PRACTITIONER

## 2025-04-21 PROCEDURE — 80048 BASIC METABOLIC PNL TOTAL CA: CPT | Performed by: INTERNAL MEDICINE

## 2025-04-21 PROCEDURE — 80202 ASSAY OF VANCOMYCIN: CPT | Performed by: INTERNAL MEDICINE

## 2025-04-21 PROCEDURE — 97165 OT EVAL LOW COMPLEX 30 MIN: CPT | Mod: GO

## 2025-04-21 PROCEDURE — 87070 CULTURE OTHR SPECIMN AEROBIC: CPT | Mod: WESLAB | Performed by: INTERNAL MEDICINE

## 2025-04-21 PROCEDURE — 85027 COMPLETE CBC AUTOMATED: CPT | Performed by: INTERNAL MEDICINE

## 2025-04-21 PROCEDURE — 99232 SBSQ HOSP IP/OBS MODERATE 35: CPT | Performed by: INTERNAL MEDICINE

## 2025-04-21 PROCEDURE — 94664 DEMO&/EVAL PT USE INHALER: CPT

## 2025-04-21 PROCEDURE — 1200000002 HC GENERAL ROOM WITH TELEMETRY DAILY

## 2025-04-21 PROCEDURE — 36415 COLL VENOUS BLD VENIPUNCTURE: CPT | Performed by: INTERNAL MEDICINE

## 2025-04-21 PROCEDURE — 97162 PT EVAL MOD COMPLEX 30 MIN: CPT | Mod: GP

## 2025-04-21 PROCEDURE — 94640 AIRWAY INHALATION TREATMENT: CPT

## 2025-04-21 PROCEDURE — 82550 ASSAY OF CK (CPK): CPT | Performed by: INTERNAL MEDICINE

## 2025-04-21 PROCEDURE — 97535 SELF CARE MNGMENT TRAINING: CPT | Mod: GO

## 2025-04-21 RX ORDER — NYSTATIN 100000 [USP'U]/G
1 POWDER TOPICAL 2 TIMES DAILY
Status: DISCONTINUED | OUTPATIENT
Start: 2025-04-21 | End: 2025-04-25 | Stop reason: HOSPADM

## 2025-04-21 RX ORDER — VANCOMYCIN 1.75 G/350ML
1250 INJECTION, SOLUTION INTRAVENOUS EVERY 24 HOURS
Status: DISCONTINUED | OUTPATIENT
Start: 2025-04-21 | End: 2025-04-24

## 2025-04-21 RX ADMIN — NYSTATIN 1 APPLICATION: 100000 POWDER TOPICAL at 00:51

## 2025-04-21 RX ADMIN — NYSTATIN 1 APPLICATION: 100000 POWDER TOPICAL at 09:12

## 2025-04-21 RX ADMIN — MEROPENEM 1 G: 1 INJECTION, POWDER, FOR SOLUTION INTRAVENOUS at 11:44

## 2025-04-21 RX ADMIN — VANCOMYCIN 1250 MG: 1.75 INJECTION, SOLUTION INTRAVENOUS at 21:19

## 2025-04-21 RX ADMIN — MEROPENEM 1 G: 1 INJECTION, POWDER, FOR SOLUTION INTRAVENOUS at 00:49

## 2025-04-21 RX ADMIN — CARVEDILOL 25 MG: 25 TABLET, FILM COATED ORAL at 16:12

## 2025-04-21 RX ADMIN — FLUTICASONE FUROATE AND VILANTEROL TRIFENATATE 1 PUFF: 200; 25 POWDER RESPIRATORY (INHALATION) at 08:21

## 2025-04-21 RX ADMIN — FUROSEMIDE 20 MG: 20 TABLET ORAL at 09:11

## 2025-04-21 RX ADMIN — TIOTROPIUM BROMIDE INHALATION SPRAY 2 PUFF: 3.12 SPRAY, METERED RESPIRATORY (INHALATION) at 08:19

## 2025-04-21 RX ADMIN — MEROPENEM 1 G: 1 INJECTION, POWDER, FOR SOLUTION INTRAVENOUS at 23:49

## 2025-04-21 RX ADMIN — OXYCODONE HYDROCHLORIDE 5 MG: 5 TABLET ORAL at 09:12

## 2025-04-21 RX ADMIN — OXYCODONE HYDROCHLORIDE 5 MG: 5 TABLET ORAL at 16:12

## 2025-04-21 RX ADMIN — NYSTATIN 1 APPLICATION: 100000 POWDER TOPICAL at 21:19

## 2025-04-21 RX ADMIN — CARVEDILOL 25 MG: 25 TABLET, FILM COATED ORAL at 09:11

## 2025-04-21 SDOH — ECONOMIC STABILITY: HOUSING INSECURITY: IN THE LAST 12 MONTHS, WAS THERE A TIME WHEN YOU WERE NOT ABLE TO PAY THE MORTGAGE OR RENT ON TIME?: NO

## 2025-04-21 SDOH — SOCIAL STABILITY: SOCIAL NETWORK: HOW OFTEN DO YOU ATTEND CHURCH OR RELIGIOUS SERVICES?: MORE THAN 4 TIMES PER YEAR

## 2025-04-21 SDOH — ECONOMIC STABILITY: HOUSING INSECURITY: IN THE PAST 12 MONTHS, HOW MANY TIMES HAVE YOU MOVED WHERE YOU WERE LIVING?: 1

## 2025-04-21 SDOH — ECONOMIC STABILITY: FOOD INSECURITY: WITHIN THE PAST 12 MONTHS, THE FOOD YOU BOUGHT JUST DIDN'T LAST AND YOU DIDN'T HAVE MONEY TO GET MORE.: NEVER TRUE

## 2025-04-21 SDOH — HEALTH STABILITY: PHYSICAL HEALTH
HOW OFTEN DO YOU NEED TO HAVE SOMEONE HELP YOU WHEN YOU READ INSTRUCTIONS, PAMPHLETS, OR OTHER WRITTEN MATERIAL FROM YOUR DOCTOR OR PHARMACY?: RARELY

## 2025-04-21 SDOH — ECONOMIC STABILITY: FOOD INSECURITY: WITHIN THE PAST 12 MONTHS, YOU WORRIED THAT YOUR FOOD WOULD RUN OUT BEFORE YOU GOT THE MONEY TO BUY MORE.: NEVER TRUE

## 2025-04-21 SDOH — ECONOMIC STABILITY: HOUSING INSECURITY: AT ANY TIME IN THE PAST 12 MONTHS, WERE YOU HOMELESS OR LIVING IN A SHELTER (INCLUDING NOW)?: NO

## 2025-04-21 SDOH — SOCIAL STABILITY: SOCIAL INSECURITY: ARE YOU MARRIED, WIDOWED, DIVORCED, SEPARATED, NEVER MARRIED, OR LIVING WITH A PARTNER?: MARRIED

## 2025-04-21 SDOH — SOCIAL STABILITY: SOCIAL NETWORK: HOW OFTEN DO YOU GET TOGETHER WITH FRIENDS OR RELATIVES?: MORE THAN THREE TIMES A WEEK

## 2025-04-21 SDOH — HEALTH STABILITY: PHYSICAL HEALTH: ON AVERAGE, HOW MANY MINUTES DO YOU ENGAGE IN EXERCISE AT THIS LEVEL?: 0 MIN

## 2025-04-21 SDOH — SOCIAL STABILITY: SOCIAL INSECURITY: ARE THERE ANY APPARENT SIGNS OF INJURIES/BEHAVIORS THAT COULD BE RELATED TO ABUSE/NEGLECT?: NO

## 2025-04-21 SDOH — SOCIAL STABILITY: SOCIAL INSECURITY: WITHIN THE LAST YEAR, HAVE YOU BEEN HUMILIATED OR EMOTIONALLY ABUSED IN OTHER WAYS BY YOUR PARTNER OR EX-PARTNER?: NO

## 2025-04-21 SDOH — SOCIAL STABILITY: SOCIAL INSECURITY: DO YOU FEEL UNSAFE GOING BACK TO THE PLACE WHERE YOU ARE LIVING?: NO

## 2025-04-21 SDOH — SOCIAL STABILITY: SOCIAL INSECURITY: HAS ANYONE EVER THREATENED TO HURT YOUR FAMILY OR YOUR PETS?: NO

## 2025-04-21 SDOH — SOCIAL STABILITY: SOCIAL INSECURITY: WITHIN THE LAST YEAR, HAVE YOU BEEN AFRAID OF YOUR PARTNER OR EX-PARTNER?: NO

## 2025-04-21 SDOH — ECONOMIC STABILITY: FOOD INSECURITY: HOW HARD IS IT FOR YOU TO PAY FOR THE VERY BASICS LIKE FOOD, HOUSING, MEDICAL CARE, AND HEATING?: NOT HARD AT ALL

## 2025-04-21 SDOH — ECONOMIC STABILITY: INCOME INSECURITY: IN THE PAST 12 MONTHS HAS THE ELECTRIC, GAS, OIL, OR WATER COMPANY THREATENED TO SHUT OFF SERVICES IN YOUR HOME?: NO

## 2025-04-21 SDOH — SOCIAL STABILITY: SOCIAL INSECURITY: HAVE YOU HAD ANY THOUGHTS OF HARMING ANYONE ELSE?: NO

## 2025-04-21 SDOH — SOCIAL STABILITY: SOCIAL NETWORK: HOW OFTEN DO YOU ATTEND MEETINGS OF THE CLUBS OR ORGANIZATIONS YOU BELONG TO?: MORE THAN 4 TIMES PER YEAR

## 2025-04-21 SDOH — SOCIAL STABILITY: SOCIAL INSECURITY: ABUSE: ADULT

## 2025-04-21 SDOH — HEALTH STABILITY: PHYSICAL HEALTH: ON AVERAGE, HOW MANY DAYS PER WEEK DO YOU ENGAGE IN MODERATE TO STRENUOUS EXERCISE (LIKE A BRISK WALK)?: 0 DAYS

## 2025-04-21 SDOH — HEALTH STABILITY: MENTAL HEALTH: EXPERIENCED ANY OF THE FOLLOWING LIFE EVENTS: OTHER (COMMENT)

## 2025-04-21 SDOH — SOCIAL STABILITY: SOCIAL INSECURITY: POSSIBLE ABUSE REPORTED TO:: OTHER (COMMENT)

## 2025-04-21 SDOH — SOCIAL STABILITY: SOCIAL INSECURITY: DOES ANYONE TRY TO KEEP YOU FROM HAVING/CONTACTING OTHER FRIENDS OR DOING THINGS OUTSIDE YOUR HOME?: NO

## 2025-04-21 SDOH — SOCIAL STABILITY: SOCIAL INSECURITY: WERE YOU ABLE TO COMPLETE ALL THE BEHAVIORAL HEALTH SCREENINGS?: YES

## 2025-04-21 SDOH — SOCIAL STABILITY: SOCIAL INSECURITY: ARE YOU OR HAVE YOU BEEN THREATENED OR ABUSED PHYSICALLY, EMOTIONALLY, OR SEXUALLY BY ANYONE?: NO

## 2025-04-21 SDOH — SOCIAL STABILITY: SOCIAL INSECURITY: DO YOU FEEL ANYONE HAS EXPLOITED OR TAKEN ADVANTAGE OF YOU FINANCIALLY OR OF YOUR PERSONAL PROPERTY?: NO

## 2025-04-21 SDOH — ECONOMIC STABILITY: TRANSPORTATION INSECURITY: IN THE PAST 12 MONTHS, HAS LACK OF TRANSPORTATION KEPT YOU FROM MEDICAL APPOINTMENTS OR FROM GETTING MEDICATIONS?: NO

## 2025-04-21 SDOH — SOCIAL STABILITY: SOCIAL INSECURITY: HAVE YOU HAD THOUGHTS OF HARMING ANYONE ELSE?: NO

## 2025-04-21 ASSESSMENT — COGNITIVE AND FUNCTIONAL STATUS - GENERAL
MOVING TO AND FROM BED TO CHAIR: A LITTLE
DRESSING REGULAR LOWER BODY CLOTHING: A LOT
EATING MEALS: A LITTLE
MOVING FROM LYING ON BACK TO SITTING ON SIDE OF FLAT BED WITH BEDRAILS: A LITTLE
STANDING UP FROM CHAIR USING ARMS: A LITTLE
DRESSING REGULAR UPPER BODY CLOTHING: A LOT
HELP NEEDED FOR BATHING: A LOT
MOBILITY SCORE: 16
DRESSING REGULAR LOWER BODY CLOTHING: A LOT
DRESSING REGULAR UPPER BODY CLOTHING: A LOT
MOVING FROM LYING ON BACK TO SITTING ON SIDE OF FLAT BED WITH BEDRAILS: A LITTLE
PERSONAL GROOMING: A LITTLE
PERSONAL GROOMING: A LITTLE
STANDING UP FROM CHAIR USING ARMS: A LITTLE
TOILETING: A LOT
MOVING TO AND FROM BED TO CHAIR: A LOT
STANDING UP FROM CHAIR USING ARMS: A LOT
PERSONAL GROOMING: A LITTLE
WALKING IN HOSPITAL ROOM: A LITTLE
MOVING TO AND FROM BED TO CHAIR: A LITTLE
WALKING IN HOSPITAL ROOM: A LOT
TOILETING: A LOT
DRESSING REGULAR UPPER BODY CLOTHING: A LOT
WALKING IN HOSPITAL ROOM: A LOT
MOBILITY SCORE: 16
PATIENT BASELINE BEDBOUND: NO
DAILY ACTIVITIY SCORE: 15
TURNING FROM BACK TO SIDE WHILE IN FLAT BAD: A LITTLE
MOBILITY SCORE: 16
MOBILITY SCORE: 13
MOVING FROM LYING ON BACK TO SITTING ON SIDE OF FLAT BED WITH BEDRAILS: A LITTLE
TOILETING: A LOT
HELP NEEDED FOR BATHING: A LOT
CLIMB 3 TO 5 STEPS WITH RAILING: A LOT
DRESSING REGULAR UPPER BODY CLOTHING: A LITTLE
DAILY ACTIVITIY SCORE: 16
MOVING FROM LYING ON BACK TO SITTING ON SIDE OF FLAT BED WITH BEDRAILS: A LITTLE
DAILY ACTIVITIY SCORE: 14
PERSONAL GROOMING: A LITTLE
HELP NEEDED FOR BATHING: A LOT
TURNING FROM BACK TO SIDE WHILE IN FLAT BAD: A LITTLE
CLIMB 3 TO 5 STEPS WITH RAILING: TOTAL
CLIMB 3 TO 5 STEPS WITH RAILING: TOTAL
TURNING FROM BACK TO SIDE WHILE IN FLAT BAD: A LITTLE
WALKING IN HOSPITAL ROOM: A LOT
HELP NEEDED FOR BATHING: A LOT
TURNING FROM BACK TO SIDE WHILE IN FLAT BAD: A LITTLE
CLIMB 3 TO 5 STEPS WITH RAILING: A LOT
STANDING UP FROM CHAIR USING ARMS: A LITTLE
DAILY ACTIVITIY SCORE: 15
DRESSING REGULAR LOWER BODY CLOTHING: A LOT
TOILETING: A LITTLE
MOVING TO AND FROM BED TO CHAIR: A LITTLE
DRESSING REGULAR LOWER BODY CLOTHING: TOTAL

## 2025-04-21 ASSESSMENT — PATIENT HEALTH QUESTIONNAIRE - PHQ9
SUM OF ALL RESPONSES TO PHQ9 QUESTIONS 1 & 2: 0
2. FEELING DOWN, DEPRESSED OR HOPELESS: NOT AT ALL
1. LITTLE INTEREST OR PLEASURE IN DOING THINGS: NOT AT ALL

## 2025-04-21 ASSESSMENT — PAIN SCALES - PAIN ASSESSMENT IN ADVANCED DEMENTIA (PAINAD)
BREATHING: NORMAL
TOTALSCORE: 0
TOTALSCORE: 0
FACIALEXPRESSION: SMILING OR INEXPRESSIVE
BREATHING: NORMAL
BODYLANGUAGE: RELAXED
BODYLANGUAGE: RELAXED
CONSOLABILITY: NO NEED TO CONSOLE
CONSOLABILITY: NO NEED TO CONSOLE
FACIALEXPRESSION: SMILING OR INEXPRESSIVE

## 2025-04-21 ASSESSMENT — PAIN - FUNCTIONAL ASSESSMENT
PAIN_FUNCTIONAL_ASSESSMENT: 0-10

## 2025-04-21 ASSESSMENT — PAIN DESCRIPTION - DESCRIPTORS
DESCRIPTORS: ACHING

## 2025-04-21 ASSESSMENT — ACTIVITIES OF DAILY LIVING (ADL)
LACK_OF_TRANSPORTATION: NO
ADEQUATE_TO_COMPLETE_ADL: YES
HOME_MANAGEMENT_TIME_ENTRY: 15
WALKS IN HOME: NEEDS ASSISTANCE
ADL_ASSISTANCE: INDEPENDENT
JUDGMENT_ADEQUATE_SAFELY_COMPLETE_DAILY_ACTIVITIES: YES
BATHING_ASSISTANCE: MODERATE
DRESSING YOURSELF: NEEDS ASSISTANCE
HEARING - RIGHT EAR: FUNCTIONAL
TOILETING: NEEDS ASSISTANCE
FEEDING YOURSELF: INDEPENDENT
LACK_OF_TRANSPORTATION: NO
BATHING: NEEDS ASSISTANCE
PATIENT'S MEMORY ADEQUATE TO SAFELY COMPLETE DAILY ACTIVITIES?: YES
GROOMING: NEEDS ASSISTANCE
HEARING - LEFT EAR: FUNCTIONAL
ADL_ASSISTANCE: INDEPENDENT

## 2025-04-21 ASSESSMENT — ENCOUNTER SYMPTOMS
COUGH: 0
SHORTNESS OF BREATH: 0
WOUND: 1
NAUSEA: 0
CHILLS: 0
ABDOMINAL PAIN: 0
FEVER: 0
VOMITING: 0
CONSTIPATION: 0
DIARRHEA: 0

## 2025-04-21 ASSESSMENT — LIFESTYLE VARIABLES
PRESCIPTION_ABUSE_PAST_12_MONTHS: NO
AUDIT-C TOTAL SCORE: 0
HOW OFTEN DO YOU HAVE 6 OR MORE DRINKS ON ONE OCCASION: NEVER
SKIP TO QUESTIONS 9-10: 1
HOW MANY STANDARD DRINKS CONTAINING ALCOHOL DO YOU HAVE ON A TYPICAL DAY: PATIENT DOES NOT DRINK
SUBSTANCE_ABUSE_PAST_12_MONTHS: NO
AUDIT-C TOTAL SCORE: 0
HOW OFTEN DO YOU HAVE A DRINK CONTAINING ALCOHOL: NEVER

## 2025-04-21 ASSESSMENT — PAIN SCALES - GENERAL
PAINLEVEL_OUTOF10: 7
PAINLEVEL_OUTOF10: 0 - NO PAIN
PAINLEVEL_OUTOF10: 6
PAINLEVEL_OUTOF10: 3
PAINLEVEL_OUTOF10: 0 - NO PAIN
PAINLEVEL_OUTOF10: 3

## 2025-04-21 ASSESSMENT — PAIN SCALES - WONG BAKER
WONGBAKER_NUMERICALRESPONSE: HURTS LITTLE BIT
WONGBAKER_NUMERICALRESPONSE: NO HURT

## 2025-04-21 NOTE — PROGRESS NOTES
Colten Eason is a 64 y.o. male on day 1 of admission presenting with Open wound of right lower extremity, initial encounter.      Subjective   Was hospitalized last week for a right thigh hematoma which required surgical drainage.  He was discharged to rehab.  About 2 days later he was sent back to the emergency room for worsening erythema around the knee area of his right leg.  Worsening pain as well.  Fevers and chills.  He is now on broad-spectrum antibiotics awaiting reevaluation from vascular surgery.       Objective   Alert oriented undistressed  Heart regular rate and rhythm  Lungs clear to auscultation  Abdomen soft nontender nondistended  Extremities 2+ edema of the right leg trace edema of the left leg.  On his right medial thigh there is a scabbed area that is about 6 inches in diameter which is surrounded by erythema.  There is also a sutured surgical site which is closed without much in the way of drainage.  No obvious abscess or palpable hematoma  Last Recorded Vitals  /70   Pulse 68   Temp 36.4 °C (97.5 °F) (Oral)   Resp 18   Wt 132 kg (291 lb 0.1 oz)   SpO2 98%   Intake/Output last 3 Shifts:    Intake/Output Summary (Last 24 hours) at 4/21/2025 0800  Last data filed at 4/21/2025 0600  Gross per 24 hour   Intake 1779 ml   Output 700 ml   Net 1079 ml       Admission Weight  Weight: 132 kg (291 lb 0.1 oz) (04/20/25 1648)    Daily Weight  04/20/25 : 132 kg (291 lb 0.1 oz)    Image Results  CT femur right wo IV contrast  Narrative: STUDY:  CT Extremity; Completed Time:  04/20/2025 at 5:58 PM  INDICATION:  Right knee/femur wound. Assess for hematoma vs. abscess.  COMPARISON:  Correlation with CT right knee imaging of same date, 04/20/25. CTA  lower extremity 04/09/25.  ACCESSION NUMBER(S):  [id number]  ORDERING CLINICIAN:  MATA MENCHACA  TECHNIQUE:  Thin section axial images were obtained through the right  femur without intravenous contrast.  Orthogonal reconstructed images  were obtained  and reviewed.    Automated mA/kV exposure control was utilized and patient examination  was performed in strict accordance with principles of ALARA.  FINDINGS:  There is no acute fracture or malalignment.  There is no  cortical erosion or periosteal reaction.  There is joint effusion of  the knee.    Femoral acetabular joint alignment is maintained with mild to moderate  arthrosis.  Visualized portions of the pelvis are intact.  There is distal soft tissue density suggesting hematoma and/or  abscess.  There are small locules of gas along the medial aspect of  the knee as described on CT.  There is medial induration, edema and  skin thickening.  Cellulitis extends along from the anterior aspect of  the posterior medial aspect of the thigh.  There is soft tissue  densities in the distal medial soft tissues with dominant density  measuring 10.1 x 6.2 x 2.5 cm (image 43 series 12 B and image 221  axial images).  On prior CT, this measured 12.2 x 6.8 x 3.5 cm.   Muscle configuration and attenuation normal.  Fascial planes are  preserved..  Images through the pelvic viscera are unremarkable.  Impression: Distal inflammatory changes and cellulitis extending into the proximal  medial thigh.  Soft tissue density along the distal medial subcutaneous suggesting  hematoma is decreased compared with prior CT.  Superimposed infection  is not entirely excluded however there is no gas or drainable fluid  component..  No evidence of myositis or acute osteomyelitis.  Signed by Dejon Winkler, DO  CT knee right wo IV contrast  Narrative: INDICATION:  Right knee wound, assess for hematoma versus abscess.  TECHNIQUE:  Thin section axial images were obtained through the right  knee without intravenous contrast.  Orthogonal reconstructed images  were obtained and reviewed.    Automated mA/kV exposure control was utilized and patient examination  was performed in strict accordance with principles of ALARA.  RADIATION AMOUNT:  459  mGy-cm.  COMPARISON:  MR Right knee 04/13/2025; CT Knee 04/09/2025.  FINDINGS:    There is no acute fracture or malalignment.  Tibial plateau is intact.   There is no osteochondral defect.  There is no cortical erosion or  periosteal reaction.  Patellofemoral joint alignment is maintained.   There is small joint effusion.  There is no intra-articular gas.  There is significant induration, edema and inflammatory changes with  anterior and medial soft tissue density suggesting abscess extending  from the level of the distal femur through the proximal diaphysis of  the tibia measuring 7.9 x 2.0 x 10.4 cm with increased attenuation  suggesting hematoma.  This appears increased in size compared with  prior MRI, however on CT performed 4/9/2025, this is seen measuring  15.9 x 4.2 x 12.0 cm.  There are locules of gas along the medial soft  tissues (image 9 through 33 axial images).  There is edema in the  anterior compartment of the calf suggesting myositis.  Subcutaneous  plantar changes and skin thickening extend in the anterior aspect of  the lower extremity.  Impression: Anterior large soft tissue density concerning for hematoma which  appears increased compared prior MRI however is decreased in size  compared with prior CT.  Overall, the appearance suggest hematoma.   Superimposed infection is not entirely excluded however there is no  locules of gas or focal drainable fluid component.  Locules of gas along the medial aspect of the knee suggesting soft  tissue infection.  Myositis in the anterior compartment of the calf.  Cellulitis is  described.  No acute fracture or malalignment.  Small joint effusion.  No evidence of intra-articular gas or septic  arthritis.  Signed by Dejon Winkler, DO           CT scan of the right leg dated April 20 shows a decreasing hematoma in the area of the right thigh.              Assessment & Plan    Cellulitis of the right thigh.  This occurring about a week after surgical incision  and drainage of a hematoma.  Will have the ulcerated area swabbed for culture.  Blood cultures pending.  Continue vancomycin and meropenem.  We have consulted infectious disease and vascular surgery further input regarding this.  PT ordered.           Dejon Strong MD

## 2025-04-21 NOTE — PROGRESS NOTES
04/21/25 1409   Discharge Planning   Expected Discharge Disposition SNF  ([facility])     Patient recently discharged from [facility] to [facility]   Patient to return upon discharge   [facility] Spoke to Tia with [facility] will need new C9 form for workers comp  States to hold off completing until IV ATB recommendations are final   Clinicals faxed at this time to [phone]

## 2025-04-21 NOTE — PROGRESS NOTES
Vancomycin Dosing by Pharmacy- FOLLOW UP    Colten Eason is a 64 y.o. year old male who Pharmacy has been consulted for vancomycin dosing for cellulitis, skin and soft tissue. Based on the patient's indication and renal status this patient is being dosed based on a goal AUC of 400-600.     Renal function is currently stable. -CKD III, Scr = 1.88    Current vancomycin dose: 1250 mg given every 24 hours    Estimated vancomycin AUC on current dose: 436 mg/L.hr     Visit Vitals  /70   Pulse 68   Temp 36.4 °C (97.5 °F) (Oral)   Resp 18        Lab Results   Component Value Date    CREATININE 1.88 (H) 2025    CREATININE 2.06 (H) 2025    CREATININE 2.06 (H) 2025    CREATININE 2.30 (H) 2025        Patient weight is as follows:   Vitals:    25 1648   Weight: 132 kg (291 lb 0.1 oz)       Cultures:  No results found for the encounter in last 14 days.       I/O last 3 completed shifts:  In: 1779 (13.5 mL/kg) [P.O.:180; IV Piggyback:1599]  Out: 700 (5.3 mL/kg) [Urine:700 (0.1 mL/kg/hr)]  Weight: 132 kg   I/O during current shift:  No intake/output data recorded.    Temp (24hrs), Av.7 °C (98.1 °F), Min:36.4 °C (97.5 °F), Max:37.1 °C (98.7 °F)      Assessment/Plan    Within goal AUC range. Continue current vancomycin regimen.    This dosing regimen is predicted by [employer] to result in the following pharmacokinetic parameters:    Regimen: 1250 mg IV every 24 hours.  Start time: 21:13 on 2025  Exposure target: AUC24 (range)400-600 mg/L.hr   [id number]: 466 mg/L.hr  AUC24,ss: 436 mg/L.hr  Probability of AUC24 > 400: 60 %  Ctrough,ss: 11.4 mg/L  Probability of Ctrough,ss > 20: 14 %    The next level will be obtained on  at 0500. May be obtained sooner if clinically indicated.   Will continue to monitor renal function daily while on vancomycin and order serum creatinine at least every 48 hours if not already ordered.  Follow for continued vancomycin needs, clinical response, and  signs/symptoms of toxicity.       Harrison Rosales, [facility]

## 2025-04-21 NOTE — CARE PLAN
Problem: Pain - Adult  Goal: Verbalizes/displays adequate comfort level or baseline comfort level  Outcome: Progressing     Problem: Safety - Adult  Goal: Free from fall injury  Outcome: Progressing     Problem: Discharge Planning  Goal: Discharge to home or other facility with appropriate resources  Outcome: Progressing     Problem: Chronic Conditions and Co-morbidities  Goal: Patient's chronic conditions and co-morbidity symptoms are monitored and maintained or improved  Outcome: Progressing     Problem: Nutrition  Goal: Nutrient intake appropriate for maintaining nutritional needs  Outcome: Progressing     Problem: Skin  Goal: Decreased wound size/increased tissue granulation at next dressing change  Outcome: Progressing  Flowsheets (Taken 4/21/2025 0506)  Decreased wound size/increased tissue granulation at next dressing change:   Promote sleep for wound healing   Utilize specialty bed per algorithm   Protective dressings over bony prominences  Goal: Participates in plan/prevention/treatment measures  Outcome: Progressing  Flowsheets (Taken 4/21/2025 0506)  Participates in plan/prevention/treatment measures:   Discuss with provider PT/OT consult   Elevate heels   Increase activity/out of bed for meals  Goal: Prevent/manage excess moisture  Outcome: Progressing  Flowsheets (Taken 4/21/2025 0506)  Prevent/manage excess moisture:   Cleanse incontinence/protect with barrier cream   Follow provider orders for dressing changes   Moisturize dry skin   Monitor for/manage infection if present   Use wicking fabric (obtain order)  Goal: Prevent/minimize sheer/friction injuries  Outcome: Progressing  Flowsheets (Taken 4/21/2025 0506)  Prevent/minimize sheer/friction injuries:   Complete micro-shifts as needed if patient unable. Adjust patient position to relieve pressure points, not a full turn   Increase activity/out of bed for meals   HOB 30 degrees or less   Turn/reposition every 2 hours/use positioning/transfer  devices   Use pull sheet   Utilize specialty bed per algorithm  Goal: Promote/optimize nutrition  Outcome: Progressing  Flowsheets (Taken 4/21/2025 0506)  Promote/optimize nutrition:   Assist with feeding   Consume > 50% meals/supplements   Discuss with provider if NPO > 2 days   Monitor/record intake including meals   Offer water/supplements/favorite foods   Reassess MST if dietician not consulted  Goal: Promote skin healing  Outcome: Progressing  Flowsheets (Taken 4/21/2025 0506)  Promote skin healing:   Assess skin/pad under line(s)/device(s)   Ensure correct size (line/device) and apply per  instructions   Protective dressings over bony prominences   Rotate device position/do not position patient on device   Turn/reposition every 2 hours/use positioning/transfer devices   The patient's goals for the shift include maintain safety, wound care    The clinical goals for the shift include Wound care, fall prevention    Over the shift, the patient did not make progress toward the following goals. Barriers to progression include . Recommendations to address these barriers include .

## 2025-04-21 NOTE — PROGRESS NOTES
Spiritual Care Visit  Spiritual Care Request    Reason for Visit:  Routine Visit: Introduction     Request Received From:       Focus of Care:  Visited With: Patient         Refer to :          Spiritual Care Assessment    Spiritual Assessment:                      Care Provided:  Intended Effects: Establish rapport and connectedness, Build relationship of care and support, Convey a calming presence, Demonstrate caring and concern, Lessen someone's feelings of isolation  Methods: Offer emotional support    Sense of Community and or Muslim Affiliation:  Roman Catholic         Addressed Needs/Concerns and/or Kuldeep Through:  Muslim Encounters  Muslim Needs: Prayer, Literature, Sacred text       Outcome:  Outcome of Spiritual Care Visit: Identifying spiritual/emotional issues, Comfort/healing presence     Advance Directives:         Spiritual Care Annotation      Annotation: Patient received a visit from the Spiritual care volunteer while admitted.  This patient was offered emotional and spiritual support no other needs were expressed. Spiritual care will remain available for support as requested.     Volunter Initial : Carlito LINN    Reviewed and Submitted by Chaplain Justice

## 2025-04-21 NOTE — CARE PLAN
The clinical goals for the shift include pain control      Problem: Pain - Adult  Goal: Verbalizes/displays adequate comfort level or baseline comfort level  Outcome: Progressing     Problem: Safety - Adult  Goal: Free from fall injury  Outcome: Progressing     Problem: Discharge Planning  Goal: Discharge to home or other facility with appropriate resources  Outcome: Progressing

## 2025-04-21 NOTE — PROGRESS NOTES
Occupational Therapy    Evaluation/Treatment    Patient Name: Colten Eason  MRN: [id number]  Department: [address]  Room: [facility]429-  Today's Date: 04/21/25  Time Calculation  Start Time: 1106  Stop Time: 1146  Time Calculation (min): 40 min       Assessment:  OT Assessment: OT order received, chart reviewed, evaluation completed. Pt demonstrated impaired ADLs and functional transfers with min A, would benefit from acute OT services to facilitate return to [facility].  Prognosis: Good  Barriers to Discharge Home: Caregiver assistance, Physical needs  Caregiver Assistance: Caregiver assistance needed per identified barriers - however, level of patient's required assistance exceeds assistance available at home  Physical Needs: Stair navigation into home limited by function/safety, Ambulating household distances limited by function/safety, Intermittent mobility assistance needed, Intermittent ADL assistance needed, High falls risk due to function or environment  Evaluation/Treatment Tolerance: Patient limited by pain  Medical Staff Made Aware: Yes  End of Session Communication: Bedside nurse  End of Session Patient Position: Bed, 3 rail up, Alarm on  OT Assessment Results: Decreased ADL status, Decreased upper extremity strength, Decreased endurance, Decreased safe judgment during ADL, Decreased functional mobility, Decreased IADLs  Prognosis: Good  Evaluation/Treatment Tolerance: Patient limited by pain  Medical Staff Made Aware: Yes  Strengths: Premorbid level of function  Barriers to Participation: Comorbidities  Plan:  Treatment Interventions: ADL retraining, Functional transfer training, UE strengthening/ROM, Endurance training, Patient/family training, Equipment evaluation/education, Compensatory technique education  OT Frequency: 3 times per week  OT Discharge Recommendations: Moderate intensity level of continued care  Equipment Recommended upon Discharge: Wheeled walker  OT Recommended Transfer Status: Minimal  assist  OT - OK to Discharge: Yes  Treatment Interventions: ADL retraining, Functional transfer training, UE strengthening/ROM, Endurance training, Patient/family training, Equipment evaluation/education, Compensatory technique education    Subjective   Current Problem:  1. Open wound of right lower extremity, initial encounter        2. Wound infection        3. Hematoma          General:   OT Received On: 04/21/25  General  Reason for Referral: activities of daily living. R LE wound  Referred By: Yenny Higgins MD  Past Medical History Relevant to Rehab: CKD, COPD, Lt ventricle hypertrophy, RLE hematoma  Family/Caregiver Present: No  Co-Treatment: PT  Co-Treatment Reason: to optimize pt outcomes  Prior to Session Communication: Bedside nurse  Patient Position Received: Bed, 3 rail up, Alarm off, not on at start of session  Preferred Learning Style: verbal, visual  General Comment: The patient is a 64 y.o. male admitted to the hospital from SNF for open wound to RLE.   Precautions:  Hearing/Visual Limitations: WFL  LE Weight Bearing Status: Weight Bearing as Tolerated  Medical Precautions: Fall precautions  Precautions Comment: dressing intact to RLE wound      Vital Signs Comment: pt on room air     Pain:  Pain Assessment  Pain Assessment: 0-10  0-10 (Numeric) Pain Score: 3  Pain Type: Acute pain  Pain Location: Leg  Pain Orientation: Right  Pain Interventions: Repositioned  Response to Interventions: Content/relaxed    Objective   Cognition:  Overall Cognitive Status: Within Functional Limits  Orientation Level: Oriented X4  Insight: Mild  Impulsive: Mildly        Home Living:  Type of Home: House  Lives With: Spouse  Home Adaptive Equipment: None  Home Layout: Multi-level, Able to live on main level with bedroom/bathroom  Home Access: Stairs to enter without rails  Entrance Stairs-Rails: None  Entrance Stairs-Number of Steps: 3-4  Bathroom Shower/Tub: Tub/shower unit  Bathroom Toilet: Standard  Bathroom  Equipment: None  Prior Function:  Level of [place]: Independent with ADLs and functional transfers, Independent with homemaking with ambulation  Receives Help From: Family  ADL Assistance: Independent  Homemaking Assistance:  (shares with spouse)  Ambulatory Assistance: Independent  Vocational: Full time employment  Prior Function Comments: Pt has been at SNF receiving skilled therapy services since DC on 4/18/25.    ADL:  Eating Assistance: Independent  Grooming Assistance: Independent  Grooming Deficit:  (seated hand hygiene post BM)  Bathing Assistance: Moderate  Bathing Deficit:  (projected for lower body)  UE Dressing Assistance: Minimal  UE Dressing Deficit: Thread RUE, Thread LUE, Pull around back  LE Dressing Assistance: Total  LE Dressing Deficit: Don/doff R shoe, Don/doff L shoe (Pt unable to don/doff shoes without assist, cues for technique in seated and standing for donning slip on shoes.)  Toileting Assistance with Device: Minimal  Toileting Deficit:  (Pt able to manage clothing and BM hygiene on commode with extended time ans cues/supervision for safety.)    Activity Tolerance:  Endurance: Tolerates 10 - 20 min exercise with multiple rests  Activity Tolerance Comments: limited due to pain  Rate of Perceived Exertion (RPE): 5/10    Bed Mobility/Transfers: Bed Mobility  Bed Mobility: Yes  Bed Mobility 1  Bed Mobility 1: Supine to sitting  Level of Assistance 1: Minimum assistance  Bed Mobility Comments 1: Assist with R LE to EOB, assist to scoot to EOB, HOB elevated and use of bed rail, cues for technique and safety  Bed Mobility 2  Bed Mobility  2: Sitting to supine  Level of Assistance 2: Minimum assistance  Bed Mobility Comments 2: Assist with B LEs into supine, HOB flat, pt able to reposition self into bed with cues ad bed rail use    Transfers  Transfer: Yes  Transfer 1  Transfer From 1: Bed to, Stand to  Transfer to 1: Sit, Stand  Technique 1: Sit to stand, Stand to sit  Transfer Device 1:  Walker  Transfer Level of Assistance 1: Minimum assistance  Trials/Comments 1: Cues for safe hand placement, pull up from RW despite cues for safe hand placement, min A for safety and balance.  Transfers 2  Transfer From 2: Stand to  Transfer to 2: Commode-standard  Technique 2: Stand to sit, Sit to stand  Transfer Device 2: Walker  Transfer Level of Assistance 2: Minimum assistance  Trials/Comments 2: Min A to control descent from RW, cues for safe hand placement. Ptstood from commode with close S for safety and RW support      Functional Mobility:  Functional Mobility  Functional Mobility Performed: Yes  Functional Mobility 1  Surface 1: Level tile  Device 1: Rolling walker  Assistance 1: Contact guard  Comments 1: Pt performed functional mobility household distance to and from bathroom in room with wide SARAH and RW use. CGA for safety    Sensation:  Light Touch: No apparent deficits  Sensation Comment: pt denies paresthesia BLE  Strength:  Strength Comments: B UEs 4/5 overall    Coordination:  Movements are Fluid and Coordinated: No  Upper Body Coordination: delayed rate and accuracy of movement   Hand Function:  Hand Function  Gross Grasp: Functional  Coordination: Functional  Extremities: RUE   RUE : Within Functional Limits and LUE   LUE: Within Functional Limits    Outcome Measures: [facility] Daily Activity  Putting on and taking off regular lower body clothing: Total  Bathing (including washing, rinsing, drying): A lot  Putting on and taking off regular upper body clothing: A little  Toileting, which includes using toilet, bedpan or urinal: A little  Taking care of personal grooming such as brushing teeth: A little  Eating Meals: None  Daily Activity - Total Score: 16        Education Documentation  ADL Training, taught by Katheryn Mckeon OT at 4/21/2025  2:01 PM.  Learner: Patient  Readiness: Acceptance  Method: Explanation  Response: Verbalizes Understanding  Comment: Pt edu on OT POC    Education Comments  No  comments found.        OP EDUCATION:       Goals:  Encounter Problems       Encounter Problems (Active)       OT Goals       ADLs (Progressing)       Start:  04/21/25    Expected End:  05/16/25       Pt will complete ADL tasks with Mod I, using AE as needed, in order to complete self-care tasks.           Functional transfers (Progressing)       Start:  04/21/25    Expected End:  05/16/25       Pt will perform functional transfers at mod ind level with RW           Functional mobility (Progressing)       Start:  04/21/25    Expected End:  05/16/25       Pt will perform functional mobility household distance at mod ind level with RW

## 2025-04-21 NOTE — CONSULTS
Inpatient consult to Infectious Diseases  Consult performed by: Denis Higgins MD  Consult ordered by: Yenny Higgins MD            Primary MD: No Assigned PCP Generic MD Earl    Reason For Consult  Infected right thigh wound    History Of Present Illness  Colten Eason is a 64 y.o. male presenting with pain, swelling and redness of the right thigh wound.  He was recently admitted to the hospital for post fall right thigh hematoma.  He underwent evacuation of hematoma and was discharged to a skilled nursing facility.  He was readmitted because of worsening pain, swelling and redness.  He had a CT scan which was remarkable for worsening hematoma.  He was admitted for further evaluation and management.  He is able to ambulate some distance without difficulty.  He was seen by vascular surgery, and evaluation by interventional radiology was ordered.  He has history of chronic kidney disease stage III.  He is on vancomycin and Zosyn.  His wound culture is pending.       Past Medical History  He has a past medical history of Chronic kidney disease, stage 3 (Multi), COPD (chronic obstructive pulmonary disease) (Multi), and Left ventricular hypertrophy.    Surgical History  He has a past surgical history that includes CT angio coronary art with heartflow if score >30% (2020) and Leg Surgery (Right, 04/10/2025).     Social History     Occupational History    Not on file   Tobacco Use    Smoking status: Former     Current packs/day: 0.00     Types: Cigarettes     Quit date:      Years since quittin.3    Smokeless tobacco: Never   Substance and Sexual Activity    Alcohol use: Not on file    Drug use: Not on file    Sexual activity: Not on file     Travel History   Travel since 26    No documented travel since 26           Family History  Family History[1]  Allergies  Patient has no known allergies.     Immunization History   Administered Date(s) Administered    Tdap vaccine, age  "7 year and older (SAUMYARIBETTY, ANNELISE) 04/09/2025     Medications  Home medications:  Prescriptions Prior to Admission[2]  Current medications:  Scheduled medications  Scheduled Medications[3]  Continuous medications  Continuous Medications[4]  PRN medications  PRN Medications[5]    Review of Systems   Constitutional:  Negative for chills and fever.   Respiratory:  Negative for cough and shortness of breath.    Cardiovascular:  Positive for leg swelling.   Gastrointestinal:  Negative for abdominal pain, constipation, diarrhea, nausea and vomiting.   Skin:  Positive for rash and wound.   All other systems reviewed and are negative.       Objective  Range of Vitals (last 24 hours)  Heart Rate:  [68-89]   Temp:  [36.4 °C (97.5 °F)-37.1 °C (98.7 °F)]   Resp:  [16-29]   BP: (108-179)/(70-97)   Height:  [172.7 cm (5' 8\")]   Weight:  [132 kg (291 lb 0.1 oz)]   SpO2:  [92 %-98 %]   Daily Weight  04/20/25 : 132 kg (291 lb 0.1 oz)    Body mass index is 44.25 kg/m².     Physical Exam  Constitutional:       Appearance: Normal appearance.   HENT:      Head: Normocephalic and atraumatic.      Nose: Nose normal.   Eyes:      General: No scleral icterus.     Extraocular Movements: Extraocular movements intact.      Conjunctiva/sclera: Conjunctivae normal.   Cardiovascular:      Rate and Rhythm: Normal rate and regular rhythm.      Heart sounds: Normal heart sounds.   Pulmonary:      Effort: Pulmonary effort is normal.      Breath sounds: Normal breath sounds.   Abdominal:      General: Bowel sounds are normal.      Palpations: Abdomen is soft.      Tenderness: There is no abdominal tenderness.   Musculoskeletal:      Cervical back: Normal range of motion and neck supple.      Right lower leg: Edema present.      Left lower leg: Edema present.   Skin:     General: Skin is warm and dry.      Comments: Right thigh/knee dressing   Neurological:      Mental Status: He is alert and oriented to person, place, and time.   Psychiatric:         " "Behavior: Behavior normal.          Relevant Results  Outside Hospital Results    Labs  Results from last 72 hours   Lab Units 04/21/25  0552 04/20/25  1721   WBC AUTO x10*3/uL 8.5 9.1   HEMOGLOBIN g/dL 9.7* 9.9*   HEMATOCRIT % 30.6* 30.5*   PLATELETS AUTO x10*3/uL 314 332   NEUTROS PCT AUTO %  --  64.1   LYMPHS PCT AUTO %  --  16.0   MONOS PCT AUTO %  --  11.1   EOS PCT AUTO %  --  3.1     Results from last 72 hours   Lab Units 04/21/25  0552 04/20/25  1721   SODIUM mmol/L 141 140   POTASSIUM mmol/L 4.1 4.4   CHLORIDE mmol/L 111* 108*   CO2 mmol/L 21 24   BUN mg/dL 50* 60*   CREATININE mg/dL 1.88* 2.06*   GLUCOSE mg/dL 95 114*   CALCIUM mg/dL 8.2* 8.3*   ANION GAP mmol/L 13 12   EGFR mL/min/1.73m*2 40* 36*     Results from last 72 hours   Lab Units 04/20/25  1721   ALK PHOS U/L 64   BILIRUBIN TOTAL mg/dL 0.4   PROTEIN TOTAL g/dL 6.2*   ALT U/L 16   AST U/L 15   ALBUMIN g/dL 3.1*     Estimated Creatinine Clearance: 53.4 mL/min (A) (by C-G formula based on SCr of 1.88 mg/dL (H)).  C-Reactive Protein   Date Value Ref Range Status   04/20/2025 11.16 (H) <1.00 mg/dL Final     Sedimentation Rate   Date Value Ref Range Status   04/20/2025 42 (H) 0 - 20 mm/h Final     No results found for: \"HIV1X2\", \"HIVCONF\", \"OPQEJE5DC\"  No results found for: \"HEPCABINIT\", \"HEPCAB\", \"HCVPCRQUANT\"  Microbiology  Reviewed-blood and wound cultures pending  Imaging  CT femur right wo IV contrast  Result Date: 4/20/2025  STUDY: CT Extremity; Completed Time:  04/20/2025 at 5:58 PM INDICATION: Right knee/femur wound. Assess for hematoma vs. abscess. COMPARISON: Correlation with CT right knee imaging of same date, 04/20/25. CTA lower extremity 04/09/25. ACCESSION NUMBER(S): [id number] ORDERING CLINICIAN: MATA MENCHACA TECHNIQUE:  Thin section axial images were obtained through the right femur without intravenous contrast.  Orthogonal reconstructed images were obtained and reviewed.  Automated mA/kV exposure control was utilized and patient " examination was performed in strict accordance with principles of ALARA. FINDINGS:  There is no acute fracture or malalignment.  There is no cortical erosion or periosteal reaction.  There is joint effusion of the knee.  Femoral acetabular joint alignment is maintained with mild to moderate arthrosis.  Visualized portions of the pelvis are intact. There is distal soft tissue density suggesting hematoma and/or abscess.  There are small locules of gas along the medial aspect of the knee as described on CT.  There is medial induration, edema and skin thickening.  Cellulitis extends along from the anterior aspect of the posterior medial aspect of the thigh.  There is soft tissue densities in the distal medial soft tissues with dominant density measuring 10.1 x 6.2 x 2.5 cm (image 43 series 12 B and image 221 axial images).  On prior CT, this measured 12.2 x 6.8 x 3.5 cm. Muscle configuration and attenuation normal.  Fascial planes are preserved.. Images through the pelvic viscera are unremarkable.    Distal inflammatory changes and cellulitis extending into the proximal medial thigh. Soft tissue density along the distal medial subcutaneous suggesting hematoma is decreased compared with prior CT.  Superimposed infection is not entirely excluded however there is no gas or drainable fluid component.. No evidence of myositis or acute osteomyelitis. Signed by Dejon Winkler, DO    CT knee right wo IV contrast  Result Date: 4/20/2025  INDICATION:  Right knee wound, assess for hematoma versus abscess. TECHNIQUE:  Thin section axial images were obtained through the right knee without intravenous contrast.  Orthogonal reconstructed images were obtained and reviewed.  Automated mA/kV exposure control was utilized and patient examination was performed in strict accordance with principles of ALARA. RADIATION AMOUNT:  459 mGy-cm. COMPARISON:  MR Right knee 04/13/2025; CT Knee 04/09/2025. FINDINGS:  There is no acute fracture or  malalignment.  Tibial plateau is intact.  There is no osteochondral defect.  There is no cortical erosion or periosteal reaction.  Patellofemoral joint alignment is maintained. There is small joint effusion.  There is no intra-articular gas. There is significant induration, edema and inflammatory changes with anterior and medial soft tissue density suggesting abscess extending from the level of the distal femur through the proximal diaphysis of the tibia measuring 7.9 x 2.0 x 10.4 cm with increased attenuation suggesting hematoma.  This appears increased in size compared with prior MRI, however on CT performed 4/9/2025, this is seen measuring 15.9 x 4.2 x 12.0 cm.  There are locules of gas along the medial soft tissues (image 9 through 33 axial images).  There is edema in the anterior compartment of the calf suggesting myositis.  Subcutaneous plantar changes and skin thickening extend in the anterior aspect of the lower extremity.    Anterior large soft tissue density concerning for hematoma which appears increased compared prior MRI however is decreased in size compared with prior CT.  Overall, the appearance suggest hematoma. Superimposed infection is not entirely excluded however there is no locules of gas or focal drainable fluid component. Locules of gas along the medial aspect of the knee suggesting soft tissue infection. Myositis in the anterior compartment of the calf.  Cellulitis is described. No acute fracture or malalignment. Small joint effusion.  No evidence of intra-articular gas or septic arthritis. Signed by Dejon Winkler DO    MR knee right wo IV contrast  Result Date: 4/13/2025  Interpreted By:  Ignacio Agosto, STUDY: MRI of the right knee without contrast dated 4/13/2025.   INDICATION: Signs/Symptoms:R quadricep tendon injury   COMPARISON: None.   ACCESSION NUMBER(S): [id number]   ORDERING CLINICIAN: HORTENCIA LEBRON   TECHNIQUE: Multiplanar multisequence MRI of the right knee was  performed without intravenous contrast.   FINDINGS: MUSCLES, TENDONS, AND LIGAMENTS:   The anterior cruciate ligament is intact.  The posterior cruciate ligament is intact. The medial collateral ligament is intact. The lateral collateral ligament complex is intact. The popliteus and biceps femoris tendons, iliotibial band, and extensor mechanism are intact.   MENISCI:   The medial meniscus is intact.  The lateral meniscus is intact.   OSSEOUS STRUCTURES AND JOINTS:   No fracture or dislocation is evident. There is marginal femorotibial and patellofemoral osteophyte formation. As seen on this exam of the hyaline articular cartilage in the femorotibial joint spaces is intact. There is full-thickness fissuring/loss of hyaline articular cartilage of the patellar apex with some foci of mild-to-moderate partial-thickness fissuring of the hyaline articular cartilage of the medial and lateral facets of the patella. There is a moderate volume knee joint effusion.   SOFT TISSUES:   No significant volume of fluid is evident in a popliteal cyst. Extensive reticular increased T2 signal intensity is seen in the subcutaneous tissues. In the subcutaneous tissues of the anterior to anteromedial proximal leg there is a fusiform heterogeneous collection measuring up to approximately 1.5 x 9.3 x 5.8 cm. It is predominantly hypointense on T2 weighted imaging with regions of some higher signal intensity, and heterogeneous with regions of hyperintensity and hypointensity on T1 weighted imaging.       1. Findings most compatible with evolving hematoma in the subcutaneous tissues of the anterior to anteromedial proximal leg. 2. Diffuse reticular increased T2 signal intensity in the subcutaneous tissues which may represent lymphedema and/or cellulitis. 3. As seen on this examination of the quadriceps tendon appears to be intact. If there is concern for compromise of the quadriceps more proximally in the thigh, thigh MRI is recommended. 4.  Moderate volume knee joint effusion. 5. Mild-to-moderate degenerative change of the knee.   Signed by: Ignacio Agosto 4/13/2025 1:53 PM Dictation workstation:   [id number]    CT angio lower extremity right w and or wo IV contrast  Result Date: 4/9/2025  STUDY: CT CTA LOWER EXTREMITY RIGHT; 04/09/2025 06:01 PM INDICATION:  Right lower extremity hematoma, evaluate for vessel dissection. COMPARISON: CT Knee 04/09/2025; XR Femur 04/09/2025. ACCESSION NUMBER(S): [id number] ORDERING CLINICIAN: TECHNIQUE:  Helical CT is performed from the infrarenal aorta through the feet after bolus administration of 120 mL of Omnipaque 350. Images are reviewed and processed at a workstation according to the CT angiogram protocol with 3-D and/or MIP post processing imaging generated.  2236 DICOM images received. Automated mA/kV exposure control was utilized and patient examination was performed in strict accordance with principles of ALARA. FINDINGS: No stenoses are seen within the visualized portions of the right superficial femoral artery, or within the right popliteal artery.  No intimal flap is visualized to indicate a dissection.  There are small contrast blush is seen in the hematomas in the medial aspect of the distal right thigh, and anterior medial aspect of the right knee.  It is indeterminate whether or not there are branches from the profunda femoral artery or the geniculate branches from the popliteal artery are contributing to these hematomas.    1.  Contrast blushing is in the two hematomas located in the medial aspect of the distal right thigh and anteromedial aspect of the right knee, suggestive of an arterial bleed.  It is indeterminate whether or not these are contributed by branches of the profunda femoral artery, or the geniculate arteries. 2.  No stenoses or dissections within the visualized portions of the right superficial femoral artery, or right popliteal artery. This report was called to Dr. Ricardo Cook at  7:40 PM on April 9, 2025. Signed by Nik Hart MD    XR hand right 3+ views  Result Date: 4/9/2025  Interpreted By:  Ludwig Briggs, STUDY: XR HAND RIGHT 3+ VIEWS   INDICATION: Signs/Symptoms:right hand pain.   COMPARISON: None   ACCESSION NUMBER(S): [id number]   ORDERING CLINICIAN: QIAN DELAROSA   FINDINGS: Mild osteoarthritis right hand more advanced at the 1st [facility]. No evidence of fracture or malalignment       Osteoarthritis right hand most prominent at the 1st [facility].   Signed by: Ludwig Briggs 4/9/2025 5:28 PM Dictation workstation:   [id number]    CT knee right wo IV contrast  Result Date: 4/9/2025  STUDY: CT Extremity; Completed Time:  4/9/2025 3:12 PM INDICATION: Evaluate for soft tissue hematoma. COMPARISON: XR RT femur 4/9/2025. ACCESSION NUMBER(S): [id number] ORDERING CLINICIAN: QIAN DELAROSA TECHNIQUE:  Thin section axial images were obtained through the right knee without intravenous contrast.  Orthogonal reconstructed images were obtained and reviewed.  Automated mA/kV exposure control was utilized and patient examination was performed in strict accordance with principles of ALARA. FINDINGS: No acute fractures or dislocations are visualized within the right knee.  There is no evidence of patellar subluxation.  No suprapatellar effusion is appreciated.  There is prominent soft tissue swelling over the anterior aspect of the knee.  There is a hyperdense fluid collection, indicative of a hematoma.  It is estimated at 14.5 x 9.9 x 5.2 cm.  There is impingement noted upon the vastus medialis of the quadriceps muscle.  However, this does not appear to involve the musculature.  There is a second hematoma over the medial aspect the distal thigh, measured at 11.7 x 8.6 x 2.7 cm.  There is generalized subcutaneous edema over the knee.    1.  Large hematoma over the anteromedial aspect of the right knee. 2.  Additional hematoma over the medial aspect of the distal right thigh. 3.  No acute osseous findings  involving the right knee. 4.  No prominent hematoma appreciated within the surrounding musculature.  5.  Subcutaneous edema overlying the right knee. Signed by Nik Hart MD    XR femur right 2+ views  Result Date: 4/9/2025  STUDY: Femur Radiographs; 4/9/2025 12:50 PM INDICATION: Right thigh pain. COMPARISON: None Available. ACCESSION NUMBER(S): [id number] ORDERING CLINICIAN: QIAN DELAROSA TECHNIQUE:  Two view(s) of the right femur (five images). FINDINGS:  There is no displaced fracture.  The alignment is anatomic.  Narrowing of the right hip joint space.  Prominent soft tissue swelling of the distal thigh and knee.    No acute bone or joint abnormality. Soft tissue swelling. DJD. Signed by Deniz Benson MD    XR knee right 4+ views  Result Date: 4/9/2025  Interpreted By:  Ca Viera, STUDY: XR KNEE RIGHT 4+ VIEWS;  4/9/2025 12:45 pm   INDICATION: Signs/Symptoms:right knee pain.     COMPARISON: None.   ACCESSION NUMBER(S): [id number]   ORDERING CLINICIAN: QIAN DELAROSA   FINDINGS: Five views of the right knee obtained.   No acute fracture or osseous displacement. Mild medial compartment narrowing. Minimal lateral and patellofemoral compartment spurring. Prominent anterior/prepatellar soft tissue swelling with possible subtle nodularity. Faint linear presumed vascular calcifications in the posterior soft tissues       No definite acute osseous finding. Mild degenerative changes. Prominent anterior soft tissue swelling with possible nodularity.   MACRO: None.   Signed by: Ca Viera 4/9/2025 12:56 PM Dictation workstation:   [id number]    CT head wo IV contrast  Result Date: 4/9/2025  Interpreted By:  Adal Landeros, STUDY: CT HEAD WO IV CONTRAST;  4/9/2025 12:42 pm   INDICATION: Signs/Symptoms:fall, hit head.   COMPARISON: None.   ACCESSION NUMBER(S): [id number]   ORDERING CLINICIAN: QIAN DELAROSA   TECHNIQUE: Noncontrast axial CT scan of head was performed. Angled reformats in brain and bone windows  were generated. The images were reviewed in bone, brain, blood and soft tissue windows.   FINDINGS: CSF Spaces: The ventricles, sulci and basal cisterns are within normal limits. There is no extraaxial fluid collection.   Parenchyma: Moderate parenchymal atrophy. Periventricular and subcortical white matter hypoattenuation is nonspecific, however likely reflects chronic microvascular ischemic versus chronic hypertensive changes. The grey-white differentiation is intact. There is no mass effect or midline shift.  There is no intracranial hemorrhage. Minimal density along the periphery of the bifrontal regions (series 4, images 20 9-38) is favored to be secondary to a mixture of motion artifact and localized beam hardening artifact   Calvarium: No acute displaced calvarial fracture. Infiltration of the right frontal scalp suggestive of localized soft tissue injury.   Paranasal sinuses and mastoids: Mastoid air cells appear clear. 2.4 x 2.0 x 2.8 cm rounded cystic lesion demonstrating peripheral calcification in the region peripheral to the fossa of Rosenmuller. Presumed cerumen within the bilateral external auditory canals. Mild mucosal thickening of the bilateral ethmoid and right frontal sinus.       No evidence of acute cortical infarct or intracranial hemorrhage.   2.4 x 2.0 x 2.8 cm rounded cystic lesion containing peripheral calcification in the region peripheral to the fossa of Rosenmuller. Follow-up nonemergent MRI soft tissue neck protocol advised for further assessment.   MACRO: None     Signed by: Adal Landeros 4/9/2025 12:53 PM Dictation workstation:   [id number]      Assessment/Plan   Infected right thigh/knee hematoma  Right thigh hematoma s/p evacuation  Chronic kidney disease stage III    Continue vancomycin  Continue Zosyn  Follow-up wound culture  Local care  Monitor renal function  Supportive care  Monitor temperature and WBC  Interventional radiology consult  Further recommendations based on  culture results  Denis Higgins MD         [1]   Family History  Problem Relation Name Age of Onset    Other (leg amputation) Father     [2]   Medications Prior to Admission   Medication Sig Dispense Refill Last Dose/Taking    albuterol 90 mcg/actuation inhaler Inhale 2 puffs every 6 hours if needed for wheezing or shortness of breath. 8 g 0     aspirin 81 mg EC tablet Take 1 tablet (81 mg) by mouth once daily.       budesonide-glycopyr-formoterol (Breztri Aerosphere) 160-9-4.8 mcg/actuation HFA aerosol inhaler Inhale 2 puffs 2 times a day.       carvedilol (Coreg) 25 mg tablet Take 1 tablet (25 mg) by mouth 2 times daily (morning and late afternoon).       furosemide (Lasix) 40 mg tablet Take 0.5 tablets (20 mg) by mouth once daily. 15 tablet 0     oxyCODONE (Roxicodone) 5 mg immediate release tablet Take 1 tablet (5 mg) by mouth every 6 hours if needed for severe pain (7 - 10) for up to 5 days. 15 tablet 0     polyethylene glycol (Glycolax, Miralax) 17 gram packet Take 17 g by mouth once daily. 30 packet 0    [3] [Held by provider] aspirin, 81 mg, oral, Daily  carvedilol, 25 mg, oral, BID  [Held by provider] enoxaparin, 40 mg, subcutaneous, q12h IVANA  tiotropium, 2 puff, inhalation, Daily   And  fluticasone furoate-vilanteroL, 1 puff, inhalation, Daily  furosemide, 20 mg, oral, Daily  meropenem, 1 g, intravenous, q12h  nystatin, 1 Application, Topical, BID  vancomycin, 1,250 mg, intravenous, q24h    [4]    [5] PRN medications: acetaminophen **OR** acetaminophen **OR** acetaminophen, albuterol, melatonin, ondansetron **OR** ondansetron, oxyCODONE, polyethylene glycol, vancomycin

## 2025-04-21 NOTE — H&P
History Of Present Illness  Colten Eason is a 64 y.o. male presenting with worsening pain and redness in the right thigh and knee.   He was recently admitted to [facility] on 4/9 with a right thigh hematoma that occurred as a result of a mechanical fall.  He had undergone evacuation of the hematoma on 4/10 by the vascular surgery team, and he was discharged to a nursing facility on 4/18.  He was referred back to the emergency department because of concerns that the wound site was looking worse and there was surrounding erythema and tenderness.  He had a CT scan of the left femur and left knee without IV contrast.  Discharge induration of the medial aspect of the knee, small locules of gas on the medial aspect of the knee, hematoma formation with distal femur and proximal tibia, appeared increased in size compared to an MRI that was done on 4/13, 3 days after hematoma evacuation.  He received IV Zosyn, vancomycin, clindamycin and cefazolin in the ED because of concerns for cellulitis.     Past Medical History  Medical History[1]    Surgical History  Surgical History[2]     Social History  He reports that he quit smoking about 21 years ago. His smoking use included cigarettes. He has never used smokeless tobacco. No history on file for alcohol use and drug use.    Family History  Family History[3]     Allergies  Patient has no known allergies.    Review of Systems    General: Negative for fever,  chills or fatigue.    HEENT: Negative for headache, blurring of vision or double vision.    Cardiovascular: Negative for chest pain, palpitations or orthopnea.    Respiratory: Negative for cough, shortness of breath or wheezing.    Gastrointestinal: Negative for nausea, vomiting, hematemesis, abdominal pain or diarrhea.   Genitourinary: Negative for dysuria, hematuria, frequency or nocturia.   Musculoskeletal: As in the HPI   Skin: Negative for rash, itching, or jaundice.   Hematologic: Negative for bleeding or bruising.    "Neurologic: Negative for headache, loss of consciousness. syncope or seizures    Physical Exam   General.: Awake alert well-developed, responsive   HEENT: Normocephalic, not icteric, not pale, no facial asymmetry, no pharyngeal erythema.   Neck: Supple, no carotid bruit, no thyroid enlargement.   Cardiovascular: Regular heart rate and rhythm normal S1 and S2.   Respiratory: Equal breath sounds bilaterally clear to auscultation.   Abdomen: Soft, nontender to palpation, bowel sounds present and normoactive.   Extremities: There was an open wound on the distal right thigh and anterior knee. There was erythema and tenderness adjacent to the proximal medial aspect of the wound extending laterally and also on the distal medial right thigh.    Neurologic: Alert and oriented to self, place and date, muscle strength 5/5 in all extremities.   Dermatologic: No rash, ecchymosis, or jaundice.   Psychological: Appropriate affect and behavior.       Last Recorded Vitals  Blood pressure 123/77, pulse 80, temperature 37.1 °C (98.7 °F), temperature source Oral, resp. rate 19, height 1.727 m (5' 8\"), weight 132 kg (291 lb 0.1 oz), SpO2 (!) 92%.    Relevant Results  Results for orders placed or performed during the hospital encounter of 04/20/25 (from the past 24 hours)   CBC and Auto Differential   Result Value Ref Range    WBC 9.1 4.4 - 11.3 x10*3/uL    nRBC 0.0 0.0 - 0.0 /100 WBCs    RBC 3.33 (L) 4.50 - 5.90 x10*6/uL    Hemoglobin 9.9 (L) 13.5 - 17.5 g/dL    Hematocrit 30.5 (L) 41.0 - 52.0 %    MCV 92 80 - 100 fL    MCH 29.7 26.0 - 34.0 pg    MCHC 32.5 32.0 - 36.0 g/dL    RDW 14.2 11.5 - 14.5 %    Platelets 332 150 - 450 x10*3/uL    Neutrophils % 64.1 40.0 - 80.0 %    Immature Granulocytes %, Automated 4.8 (H) 0.0 - 0.9 %    Lymphocytes % 16.0 13.0 - 44.0 %    Monocytes % 11.1 2.0 - 10.0 %    Eosinophils % 3.1 0.0 - 6.0 %    Basophils % 0.9 0.0 - 2.0 %    Neutrophils Absolute 5.84 1.20 - 7.70 x10*3/uL    Immature Granulocytes " Absolute, Automated 0.44 0.00 - 0.70 x10*3/uL    Lymphocytes Absolute 1.46 1.20 - 4.80 x10*3/uL    Monocytes Absolute 1.01 (H) 0.10 - 1.00 x10*3/uL    Eosinophils Absolute 0.28 0.00 - 0.70 x10*3/uL    Basophils Absolute 0.08 0.00 - 0.10 x10*3/uL   Comprehensive metabolic panel   Result Value Ref Range    Glucose 114 (H) 74 - 99 mg/dL    Sodium 140 136 - 145 mmol/L    Potassium 4.4 3.5 - 5.3 mmol/L    Chloride 108 (H) 98 - 107 mmol/L    Bicarbonate 24 21 - 32 mmol/L    Anion Gap 12 10 - 20 mmol/L    Urea Nitrogen 60 (H) 6 - 23 mg/dL    Creatinine 2.06 (H) 0.50 - 1.30 mg/dL    eGFR 36 (L) >60 mL/min/1.73m*2    Calcium 8.3 (L) 8.6 - 10.3 mg/dL    Albumin 3.1 (L) 3.4 - 5.0 g/dL    Alkaline Phosphatase 64 33 - 136 U/L    Total Protein 6.2 (L) 6.4 - 8.2 g/dL    AST 15 9 - 39 U/L    Bilirubin, Total 0.4 0.0 - 1.2 mg/dL    ALT 16 10 - 52 U/L   Sedimentation rate, automated   Result Value Ref Range    Sedimentation Rate 42 (H) 0 - 20 mm/h   C-reactive protein   Result Value Ref Range    C-Reactive Protein 11.16 (H) <1.00 mg/dL   Lactate   Result Value Ref Range    Lactate 0.7 0.4 - 2.0 mmol/L   Urinalysis with Reflex Culture and Microscopic   Result Value Ref Range    Color, Urine Colorless (N) Light-Yellow, Yellow, Dark-Yellow    Appearance, Urine Clear Clear    Specific Gravity, Urine 1.014 1.005 - 1.035    pH, Urine 6.0 5.0, 5.5, 6.0, 6.5, 7.0, 7.5, 8.0    Protein, Urine 100 (2+) (A) NEGATIVE, 10 (TRACE), 20 (TRACE) mg/dL    Glucose, Urine 30 (TRACE) (A) Normal mg/dL    Blood, Urine 0.06 (1+) (A) NEGATIVE mg/dL    Ketones, Urine NEGATIVE NEGATIVE mg/dL    Bilirubin, Urine NEGATIVE NEGATIVE mg/dL    Urobilinogen, Urine Normal Normal mg/dL    Nitrite, Urine NEGATIVE NEGATIVE    Leukocyte Esterase, Urine NEGATIVE NEGATIVE   Urinalysis Microscopic   Result Value Ref Range    WBC, Urine 1-5 1-5, NONE /HPF    RBC, Urine NONE NONE, 1-2, 3-5 /HPF    Mucus, Urine FEW Reference range not established. /LPF     CT femur right wo IV  contrast  Result Date: 4/20/2025  STUDY: CT Extremity; Completed Time:  04/20/2025 at 5:58 PM INDICATION: Right knee/femur wound. Assess for hematoma vs. abscess. COMPARISON: Correlation with CT right knee imaging of same date, 04/20/25. CTA lower extremity 04/09/25. ACCESSION NUMBER(S): [id number] ORDERING CLINICIAN: MATA MENCHACA TECHNIQUE:  Thin section axial images were obtained through the right femur without intravenous contrast.  Orthogonal reconstructed images were obtained and reviewed.  Automated mA/kV exposure control was utilized and patient examination was performed in strict accordance with principles of ALARA. FINDINGS:  There is no acute fracture or malalignment.  There is no cortical erosion or periosteal reaction.  There is joint effusion of the knee.  Femoral acetabular joint alignment is maintained with mild to moderate arthrosis.  Visualized portions of the pelvis are intact. There is distal soft tissue density suggesting hematoma and/or abscess.  There are small locules of gas along the medial aspect of the knee as described on CT.  There is medial induration, edema and skin thickening.  Cellulitis extends along from the anterior aspect of the posterior medial aspect of the thigh.  There is soft tissue densities in the distal medial soft tissues with dominant density measuring 10.1 x 6.2 x 2.5 cm (image 43 series 12 B and image 221 axial images).  On prior CT, this measured 12.2 x 6.8 x 3.5 cm. Muscle configuration and attenuation normal.  Fascial planes are preserved.. Images through the pelvic viscera are unremarkable.    Distal inflammatory changes and cellulitis extending into the proximal medial thigh. Soft tissue density along the distal medial subcutaneous suggesting hematoma is decreased compared with prior CT.  Superimposed infection is not entirely excluded however there is no gas or drainable fluid component.. No evidence of myositis or acute osteomyelitis. Signed by Dejon Mckeon  Peterson, DO    CT knee right wo IV contrast  Result Date: 4/20/2025  INDICATION:  Right knee wound, assess for hematoma versus abscess. TECHNIQUE:  Thin section axial images were obtained through the right knee without intravenous contrast.  Orthogonal reconstructed images were obtained and reviewed.  Automated mA/kV exposure control was utilized and patient examination was performed in strict accordance with principles of ALARA. RADIATION AMOUNT:  459 mGy-cm. COMPARISON:  MR Right knee 04/13/2025; CT Knee 04/09/2025. FINDINGS:  There is no acute fracture or malalignment.  Tibial plateau is intact.  There is no osteochondral defect.  There is no cortical erosion or periosteal reaction.  Patellofemoral joint alignment is maintained. There is small joint effusion.  There is no intra-articular gas. There is significant induration, edema and inflammatory changes with anterior and medial soft tissue density suggesting abscess extending from the level of the distal femur through the proximal diaphysis of the tibia measuring 7.9 x 2.0 x 10.4 cm with increased attenuation suggesting hematoma.  This appears increased in size compared with prior MRI, however on CT performed 4/9/2025, this is seen measuring 15.9 x 4.2 x 12.0 cm.  There are locules of gas along the medial soft tissues (image 9 through 33 axial images).  There is edema in the anterior compartment of the calf suggesting myositis.  Subcutaneous plantar changes and skin thickening extend in the anterior aspect of the lower extremity.    Anterior large soft tissue density concerning for hematoma which appears increased compared prior MRI however is decreased in size compared with prior CT.  Overall, the appearance suggest hematoma. Superimposed infection is not entirely excluded however there is no locules of gas or focal drainable fluid component. Locules of gas along the medial aspect of the knee suggesting soft tissue infection. Myositis in the anterior compartment  of the calf.  Cellulitis is described. No acute fracture or malalignment. Small joint effusion.  No evidence of intra-articular gas or septic arthritis. Signed by Dejon iWnkler, DO      Assessment & Plan    Hematoma of the right thigh  He had undergone evacuation of the hematoma on 4/10  Hematoma present on CT scan today.  The ED nurse reported some bleeding noted when his wound dressing on the right thigh was removed.  Vascular surgery consult    Cellulitis of right lower limb  Erythema and tenderness adjacent to the site of the hematoma  He has elevated C-reactive protein and ESR  Continue IV antibiotic coverage  Infectious disease consult    Chronic kidney disease stage III  Renal function appears to be at baseline    Chronic obstructive pulmonary disease  Stable, not in acute exacerbation    Yenny Higgins MD         [1]   Past Medical History:  Diagnosis Date    Chronic kidney disease, stage 3 (Multi)     COPD (chronic obstructive pulmonary disease) (Multi)     Left ventricular hypertrophy    [2]   Past Surgical History:  Procedure Laterality Date    CT ANGIO CORONARY ART WITH HEARTFLOW IF SCORE >30%  07/27/2020    CT HEART CORONARY ANGIOGRAM 7/27/2020 [facility]    LEG SURGERY Right 04/10/2025    Evacuation of right thigh hematoma   [3]   Family History  Problem Relation Name Age of Onset    Other (leg amputation) Father

## 2025-04-21 NOTE — CONSULTS
Vancomycin Dosing by Pharmacy- INITIAL    Colten Eason is a 64 y.o. year old male who Pharmacy has been consulted for vancomycin dosing for cellulitis, skin and soft tissue. Based on the patient's indication and renal status this patient will be dosed based on a goal AUC of 400-600.     Renal function is currently stable, but poor. Will schedule dosing, but monitor closely.    Visit Vitals  /75   Pulse 73   Temp 37.1 °C (98.7 °F) (Oral)   Resp 16        Lab Results   Component Value Date    CREATININE 2.06 (H) 2025    CREATININE 2.06 (H) 2025    CREATININE 2.30 (H) 2025    CREATININE 2.27 (H) 2025        Patient weight is as follows:   Vitals:    25 1648   Weight: 132 kg (291 lb 0.1 oz)       Cultures:  No results found for the encounter in last 14 days.        I/O last 3 completed shifts:  In: 50 (0.4 mL/kg) [IV Piggyback:50]  Out: - (0 mL/kg)   Weight: 132 kg   I/O during current shift:  I/O this shift:  In: 1549 [IV Piggyback:1549]  Out: -     Temp (24hrs), Av.1 °C (98.7 °F), Min:37.1 °C (98.7 °F), Max:37.1 °C (98.7 °F)         Assessment/Plan     Patient has already been given a loading dose of 2000 mg.  Will initiate vancomycin maintenance,  1250 mg every 24 hours.    This dosing regimen is predicted by [employer] to result in the following pharmacokinetic parameters:    Loading dose: N/A  Regimen: 1250 mg IV every 24 hours.  Start time: 21:13 on 2025  Exposure target: AUC24 (range)400-600 mg/L.hr   [id number]: 473 mg/L.hr  AUC24,ss: 474 mg/L.hr  Probability of AUC24 > 400: 63 %  Ctrough,ss: 12.1 mg/L  Probability of Ctrough,ss > 20: 26 %    Follow-up level will be ordered on  at 0500, unless clinically indicated sooner.  Will continue to monitor renal function daily while on vancomycin and order serum creatinine at least every 48 hours if not already ordered.  Follow for continued vancomycin needs, clinical response, and signs/symptoms of toxicity.       Wali ALAN  Logan PharmD

## 2025-04-21 NOTE — PROGRESS NOTES
Physical Therapy    Physical Therapy Evaluation    Patient Name: Colten Eason  MRN: [id number]  Department: [address]  Room: [facility]429-  Today's Date: 4/21/2025   Time Calculation  Start Time: 1105  Stop Time: 1145  Time Calculation (min): 40 min    Assessment/Plan   PT Assessment  PT Assessment Results: Decreased strength, Decreased range of motion, Decreased endurance, Impaired balance, Decreased mobility, Decreased coordination, Impaired judgement, Decreased safety awareness, Impaired sensation, Decreased skin integrity, Pain  Rehab Prognosis: Good  Barriers to Discharge Home: Physical needs  Physical Needs: Stair navigation into home limited by function/safety, Ambulating household distances limited by function/safety, Intermittent mobility assistance needed, Intermittent ADL assistance needed  Evaluation/Treatment Tolerance: Patient tolerated treatment well  Medical Staff Made Aware: Yes  Strengths: Ability to acquire knowledge  Barriers to Participation: Comorbidities  End of Session Communication: Bedside nurse  Assessment Comment: The patient is a 64 y.o. male admitted to the hospital for a RLE wound from SNF. The patient currently requires Marlon to CGA for transfers and ambulation with RW. The patient would continue to benefit from skilled therapy services to address functional deficits.  End of Session Patient Position: Bed, 3 rail up, Alarm on  IP OR SWING BED PT PLAN  Inpatient or Swing Bed: Inpatient  PT Plan  Treatment/Interventions: Bed mobility, Transfer training, Gait training, Stair training, Balance training, Neuromuscular re-education, Strengthening, Endurance training, Range of motion, Therapeutic activity, Therapeutic exercise, Home exercise program  PT Plan: Ongoing PT  PT Frequency: 4 times per week  PT Discharge Recommendations: Moderate intensity level of continued care  Equipment Recommended upon Discharge: Wheeled walker  PT Recommended Transfer Status: Assist x1  PT - OK to Discharge:  Yes    Subjective   General Visit Information:  General  Reason for Referral: mobility impairment due to wound of RLE  Referred By: Yenny Higgins MD  Past Medical History Relevant to Rehab: CKD, COPD, Lt ventricle hypertrophy, RLE hematoma  Family/Caregiver Present: No  Co-Treatment: OT  Co-Treatment Reason: to optimize pt outcomes  Prior to Session Communication: Bedside nurse  Patient Position Received: Bed, 3 rail up, Alarm off, not on at start of session  Preferred Learning Style: verbal, visual  General Comment: The patient is a 64 y.o. male admitted to the hospital from SNF for open wound to RLE.  Home Living:  Home Living  Type of Home: House  Lives With: Spouse  Home Adaptive Equipment: None  Home Layout: Multi-level, Able to live on main level with bedroom/bathroom  Home Access: Stairs to enter without rails  Entrance Stairs-Rails: None  Entrance Stairs-Number of Steps: 3-4  Bathroom Shower/Tub: Tub/shower unit  Bathroom Toilet: Standard  Bathroom Equipment: None  Prior Level of Function:  Prior Function Per Pt/Caregiver Report  Level of [place]: Independent with ADLs and functional transfers, Independent with homemaking with ambulation  Receives Help From: Family  ADL Assistance: Independent  Homemaking Assistance: Independent (shares with spouse)  Ambulatory Assistance: Independent  Vocational: Full time employment  Prior Function Comments: Pt has been at [facility] receiving skilled therapy services since MD on 4/18/25.  Precautions:  Precautions  LE Weight Bearing Status: Weight Bearing as Tolerated  Medical Precautions: Fall precautions  Precautions Comment: dressing intact to RLE wound       Vital Signs Comment: pt on room air     Objective   Pain:  Pain Assessment  Pain Assessment: 0-10  0-10 (Numeric) Pain Score: 3  Pain Type: Acute pain  Pain Location: Leg  Pain Orientation: Right  Pain Interventions: Repositioned, Ambulation/increased activity  Response to Interventions:  Content/relaxed  Cognition:  Cognition  Overall Cognitive Status: Within Functional Limits  Orientation Level: Oriented X4  Insight: Mild  Impulsive: Mildly    General Assessments:  General Observation  General Observation: agreeable to mobility     Activity Tolerance  Endurance: Tolerates 10 - 20 min exercise with multiple rests  Activity Tolerance Comments: limited due to RLE pain  Rate of Perceived Exertion (RPE): 5/10    Sensation  Sensation Comment: pt denies paresthesia BLE    Strength  Strength Comments: BLE grossly 3+/5  Coordination  Coordination Comment: increased time and effort due to pain in RLE    Postural Control  Posture Comment: forward head; rounded shoulders    Static Sitting Balance  Static Sitting-Balance Support: Feet supported  Static Sitting-Level of Assistance: Close supervision  Static Sitting-Comment/Number of Minutes: EOB for approx. 5 min    Static Standing Balance  Static Standing-Balance Support: Bilateral upper extremity supported (RW)  Static Standing-Level of Assistance: Minimum assistance  Static Standing-Comment/Number of Minutes: forward flexed posture  Dynamic Standing Balance  Dynamic Standing-Balance Support: Bilateral upper extremity supported (RW)  Dynamic Standing-Level of Assistance: Minimum assistance  Dynamic Standing-Comments: Jillian for safe ambulation with RW; antalgic gait pattern  Functional Assessments:  Bed Mobility  Bed Mobility: Yes  Bed Mobility 1  Bed Mobility 1: Supine to sitting  Level of Assistance 1: Contact guard  Bed Mobility Comments 1: assist with RLE to EOB  Bed Mobility 2  Bed Mobility  2: Sitting to supine  Level of Assistance 2: Minimum assistance  Bed Mobility Comments 2: assist with BLE into supine    Transfers  Transfer: Yes  Transfer 1  Transfer From 1: Sit to, Stand to  Transfer to 1: Stand, Sit  Technique 1: Sit to stand, Stand to sit  Transfer Device 1: Walker  Transfer Level of Assistance 1: Minimum assistance  Trials/Comments 1: completed x  3; VCs for safe sequencing and hand placement    Ambulation/Gait Training  Ambulation/Gait Training Performed: Yes  Ambulation/Gait Training 1  Surface 1: Level tile  Device 1: Rolling walker  Assistance 1: Minimum assistance  Quality of Gait 1: Narrow base of support, Antalgic  Comments/Distance (ft) 1: 35ftx2 with RW and Marlon; slow and antalgic gait; mildly unsteady; no major LOB.  Extremity/Trunk Assessments:  RLE   RLE :  (limited due to pain; grossly 3/5)  LLE   LLE :  (grossly >3+/5)  Outcome Measures:  [facility] Basic Mobility  Turning from your back to your side while in a flat bed without using bedrails: A little  Moving from lying on your back to sitting on the side of a flat bed without using bedrails: A little  Moving to and from bed to chair (including a wheelchair): A little  Standing up from a chair using your arms (e.g. wheelchair or bedside chair): A little  To walk in hospital room: A little  Climbing 3-5 steps with railing: Total  Basic Mobility - Total Score: 16    Encounter Problems       Encounter Problems (Active)       PT Problem       Strength (Progressing)       Start:  04/21/25    Expected End:  05/21/25       The patient will demonstrate an overall strength of 4/5 in BLE to assist with completion of functional mobility.           Functional Mobility (Progressing)       Start:  04/21/25    Expected End:  05/21/25       The patient will complete functional mobility (bed mobility, transfers, etc.) at a mod indep level with LRAD by DC.           Ambulation (Progressing)       Start:  04/21/25    Expected End:  05/21/25       The patient will be able to ambulate at a mod indep level for >50ftx1 with RW.          Steps (Progressing)       Start:  04/21/25    Expected End:  05/21/25       The patient will be able to ascend/descend 4 steps with use of 1 rail at a mod indep level by DC.             Pain - Adult              Education Documentation  Body Mechanics, taught by Luci Sevilla, PT at  4/21/2025  1:38 PM.  Learner: Patient  Readiness: Acceptance  Method: Explanation  Response: Verbalizes Understanding  Comment: education provided on PT POC; VCs for safety awareness    Mobility Training, taught by Luci Sevilla PT at 4/21/2025  1:38 PM.  Learner: Patient  Readiness: Acceptance  Method: Explanation  Response: Verbalizes Understanding  Comment: education provided on PT POC; VCs for safety awareness    Education Comments  No comments found.

## 2025-04-21 NOTE — CONSULTS
Reason for Consult   Right thigh pain, previous hematoma    History Of Present Illness    This is a 64-year-old male with past history of COPD, CKD stage III who underwent right thigh hematoma evacuation by Dr. Tao on 4/10/2025 after patient sustained a fall to his right knee.  Patient was discharged to facility in satisfactory condition on 2025.  He presented yesterday with worsening pain and redness of the right thigh.  He was seen at bedside with Dr. Tao this morning.  There was concern of soft tissue density concerning for hematoma on noncontrast CT scan left femur or knee.  Dr. Hart with concerns of possible gas in the thigh in the area of old bullae, proximal area of tissue necrosis with increased tenderness and erythema.  His white count is 8.5.  His H&H is 9.8/30.2.  Creatinine is 1.88.  He endorses tenderness.  Ambulates short distances with a walker.  Endorses moderate to severe tenderness associated with the right thigh.  He has intact sutures.     Past Medical History  Medical History[1]      Surgical History  Surgical History[2]       Social History  Social History     Socioeconomic History    Marital status:      Spouse name: Not on file    Number of children: Not on file    Years of education: Not on file    Highest education level: Not on file   Occupational History    Not on file   Tobacco Use    Smoking status: Former     Current packs/day: 0.00     Types: Cigarettes     Quit date:      Years since quittin.3    Smokeless tobacco: Never   Substance and Sexual Activity    Alcohol use: Not on file    Drug use: Not on file    Sexual activity: Not on file   Other Topics Concern    Not on file   Social History Narrative    Not on file     Social Drivers of Health     Financial Resource Strain: Low Risk  (2025)    Overall Financial Resource Strain (CARDIA)     Difficulty of Paying Living Expenses: Not hard at all   Recent Concern: Financial Resource Strain - Medium Risk  (4/11/2025)    Overall Financial Resource Strain (CARDIA)     Difficulty of Paying Living Expenses: Somewhat hard   Food Insecurity: No Food Insecurity (4/21/2025)    Hunger Vital Sign     Worried About Running Out of Food in the Last Year: Never true     Ran Out of Food in the Last Year: Never true   Transportation Needs: No Transportation Needs (4/21/2025)    PRAPARE - Transportation     Lack of Transportation (Medical): No     Lack of Transportation (Non-Medical): No   Physical Activity: Inactive (4/21/2025)    Exercise Vital Sign     Days of Exercise per Week: 0 days     Minutes of Exercise per Session: 0 min   Stress: No Stress Concern Present (4/21/2025)    [employer] [place] [employer] - Occupational Stress Questionnaire     Feeling of Stress : Not at all   Social Connections: Socially Integrated (4/21/2025)    Social Connection and Isolation Panel [NHANES]     Frequency of Communication with Friends and Family: More than three times a week     Frequency of Social Gatherings with Friends and Family: More than three times a week     Attends [employer] Services: More than 4 times per year     Active Member of Clubs or Organizations: Yes     Attends Club or Organization Meetings: More than 4 times per year     Marital Status:    Intimate Partner Violence: Not At Risk (4/21/2025)    Humiliation, Afraid, Rape, and Kick questionnaire     Fear of Current or Ex-Partner: No     Emotionally Abused: No     Physically Abused: No     Sexually Abused: No   Housing Stability: Low Risk  (4/21/2025)    Housing Stability Vital Sign     Unable to Pay for Housing in the Last Year: No     Number of Times Moved in the Last Year: 1     Homeless in the Last Year: No         Family History  Family History[3]       Allergies  RX Allergies[4]      Relevant Results  Results for orders placed or performed during the hospital encounter of 04/20/25 (from the past 24 hours)   CBC and Auto Differential   Result Value Ref  Range    WBC 9.1 4.4 - 11.3 x10*3/uL    nRBC 0.0 0.0 - 0.0 /100 WBCs    RBC 3.33 (L) 4.50 - 5.90 x10*6/uL    Hemoglobin 9.9 (L) 13.5 - 17.5 g/dL    Hematocrit 30.5 (L) 41.0 - 52.0 %    MCV 92 80 - 100 fL    MCH 29.7 26.0 - 34.0 pg    MCHC 32.5 32.0 - 36.0 g/dL    RDW 14.2 11.5 - 14.5 %    Platelets 332 150 - 450 x10*3/uL    Neutrophils % 64.1 40.0 - 80.0 %    Immature Granulocytes %, Automated 4.8 (H) 0.0 - 0.9 %    Lymphocytes % 16.0 13.0 - 44.0 %    Monocytes % 11.1 2.0 - 10.0 %    Eosinophils % 3.1 0.0 - 6.0 %    Basophils % 0.9 0.0 - 2.0 %    Neutrophils Absolute 5.84 1.20 - 7.70 x10*3/uL    Immature Granulocytes Absolute, Automated 0.44 0.00 - 0.70 x10*3/uL    Lymphocytes Absolute 1.46 1.20 - 4.80 x10*3/uL    Monocytes Absolute 1.01 (H) 0.10 - 1.00 x10*3/uL    Eosinophils Absolute 0.28 0.00 - 0.70 x10*3/uL    Basophils Absolute 0.08 0.00 - 0.10 x10*3/uL   Comprehensive metabolic panel   Result Value Ref Range    Glucose 114 (H) 74 - 99 mg/dL    Sodium 140 136 - 145 mmol/L    Potassium 4.4 3.5 - 5.3 mmol/L    Chloride 108 (H) 98 - 107 mmol/L    Bicarbonate 24 21 - 32 mmol/L    Anion Gap 12 10 - 20 mmol/L    Urea Nitrogen 60 (H) 6 - 23 mg/dL    Creatinine 2.06 (H) 0.50 - 1.30 mg/dL    eGFR 36 (L) >60 mL/min/1.73m*2    Calcium 8.3 (L) 8.6 - 10.3 mg/dL    Albumin 3.1 (L) 3.4 - 5.0 g/dL    Alkaline Phosphatase 64 33 - 136 U/L    Total Protein 6.2 (L) 6.4 - 8.2 g/dL    AST 15 9 - 39 U/L    Bilirubin, Total 0.4 0.0 - 1.2 mg/dL    ALT 16 10 - 52 U/L   Sedimentation rate, automated   Result Value Ref Range    Sedimentation Rate 42 (H) 0 - 20 mm/h   C-reactive protein   Result Value Ref Range    C-Reactive Protein 11.16 (H) <1.00 mg/dL   Lactate   Result Value Ref Range    Lactate 0.7 0.4 - 2.0 mmol/L   Blood Culture    Specimen: Peripheral Venipuncture; Blood culture   Result Value Ref Range    Blood Culture Loaded on Instrument - Culture in progress    Blood Culture    Specimen: Peripheral Venipuncture; Blood culture    Result Value Ref Range    Blood Culture Loaded on Instrument - Culture in progress    Urinalysis with Reflex Culture and Microscopic   Result Value Ref Range    Color, Urine Colorless (N) Light-Yellow, Yellow, Dark-Yellow    Appearance, Urine Clear Clear    Specific Gravity, Urine 1.014 1.005 - 1.035    pH, Urine 6.0 5.0, 5.5, 6.0, 6.5, 7.0, 7.5, 8.0    Protein, Urine 100 (2+) (A) NEGATIVE, 10 (TRACE), 20 (TRACE) mg/dL    Glucose, Urine 30 (TRACE) (A) Normal mg/dL    Blood, Urine 0.06 (1+) (A) NEGATIVE mg/dL    Ketones, Urine NEGATIVE NEGATIVE mg/dL    Bilirubin, Urine NEGATIVE NEGATIVE mg/dL    Urobilinogen, Urine Normal Normal mg/dL    Nitrite, Urine NEGATIVE NEGATIVE    Leukocyte Esterase, Urine NEGATIVE NEGATIVE   Extra Urine Gray Tube   Result Value Ref Range    Extra Tube Hold for add-ons.    Urinalysis Microscopic   Result Value Ref Range    WBC, Urine 1-5 1-5, NONE /HPF    RBC, Urine NONE NONE, 1-2, 3-5 /HPF    Mucus, Urine FEW Reference range not established. /LPF   Basic Metabolic Panel   Result Value Ref Range    Glucose 95 74 - 99 mg/dL    Sodium 141 136 - 145 mmol/L    Potassium 4.1 3.5 - 5.3 mmol/L    Chloride 111 (H) 98 - 107 mmol/L    Bicarbonate 21 21 - 32 mmol/L    Anion Gap 13 10 - 20 mmol/L    Urea Nitrogen 50 (H) 6 - 23 mg/dL    Creatinine 1.88 (H) 0.50 - 1.30 mg/dL    eGFR 40 (L) >60 mL/min/1.73m*2    Calcium 8.2 (L) 8.6 - 10.3 mg/dL   CBC   Result Value Ref Range    WBC 8.5 4.4 - 11.3 x10*3/uL    nRBC 0.0 0.0 - 0.0 /100 WBCs    RBC 3.31 (L) 4.50 - 5.90 x10*6/uL    Hemoglobin 9.7 (L) 13.5 - 17.5 g/dL    Hematocrit 30.6 (L) 41.0 - 52.0 %    MCV 92 80 - 100 fL    MCH 29.3 26.0 - 34.0 pg    MCHC 31.7 (L) 32.0 - 36.0 g/dL    RDW 14.2 11.5 - 14.5 %    Platelets 314 150 - 450 x10*3/uL   Vancomycin   Result Value Ref Range    Vancomycin 19.0 5.0 - 20.0 ug/mL     Imaging  CT femur right wo IV contrast  Result Date: 4/20/2025  Distal inflammatory changes and cellulitis extending into the proximal  medial thigh. Soft tissue density along the distal medial subcutaneous suggesting hematoma is decreased compared with prior CT.  Superimposed infection is not entirely excluded however there is no gas or drainable fluid component.. No evidence of myositis or acute osteomyelitis. Signed by Dejon Winkler DO    CT knee right wo IV contrast  Result Date: 4/20/2025  Anterior large soft tissue density concerning for hematoma which appears increased compared prior MRI however is decreased in size compared with prior CT.  Overall, the appearance suggest hematoma. Superimposed infection is not entirely excluded however there is no locules of gas or focal drainable fluid component. Locules of gas along the medial aspect of the knee suggesting soft tissue infection. Myositis in the anterior compartment of the calf.  Cellulitis is described. No acute fracture or malalignment. Small joint effusion.  No evidence of intra-articular gas or septic arthritis. Signed by Dejon Winkler DO      Cardiology, Vascular, and Other Imaging  No other imaging results found for the past 2 days        Physical exam  Constitutional:  Alert and oriented to person, place, date/time in no acute distress.  HEENT:  Atraumatic, normocephalic. PERRL. EOMI.  Nares patent.  Mucous membranes moist.    Neck:  Trachea midline.  Respiratory:  Clear to auscultation.  Cardiac:  Regular rate and rhythm.    Cardiovascular:  No edema of the extremities.  Pulse exam: PT pulses palpable  Abdominal:  Soft, nontender, nondistended, bowel sounds present.  Morbidly obese  Musculoskeletal:  Moves extremities freely.  Right thigh tenderness  Dermatological: Right thigh incision site well coapted.  Large area of tissue necrosis noted to the medial knee and thigh with surrounding erythema and ecchymosis.  Tender with palpation.  Some serous drainage noted by the incision site.  No fluctuance.  No crepitus.  Neurological: Alert and oriented to person, place,  date/time  Psych:  Calm, cooperative    Assessment and Plan  Right thigh hematoma  Right thigh cellulitis  Right thigh pain  CKD stage III    64-year-old male who underwent right thigh hematoma evacuation by Dr. Tao on 4/10/2025.  He had a KATTY drain that was placed during surgery which was removed prior to discharge.  The CT noted large soft tissue density concerning for hematoma increased from prior MRI however decrease in size from prior CT.  Dr. Tao also reviewed and had some concerns of area of gas around the proximal thigh tissue necrosis.  I did reach out to IR and am awaiting callback.  No plans by our service for surgery at this time but will continue to assess daily.  Would prefer a CAT scan with contrast however patient has CKD so we will hold off at this time.  Continue local wound care with Xeroform, dry sterile dressing and Ace wrap.  Orders placed.             [1]   Past Medical History:  Diagnosis Date    Chronic kidney disease, stage 3 (Multi)     COPD (chronic obstructive pulmonary disease) (Multi)     Left ventricular hypertrophy    [2]   Past Surgical History:  Procedure Laterality Date    CT ANGIO CORONARY ART WITH HEARTFLOW IF SCORE >30%  07/27/2020    CT HEART CORONARY ANGIOGRAM 7/27/2020 [facility]    LEG SURGERY Right 04/10/2025    Evacuation of right thigh hematoma   [3]   Family History  Problem Relation Name Age of Onset    Other (leg amputation) Father     [4] No Known Allergies

## 2025-04-22 LAB
ANION GAP SERPL CALCULATED.3IONS-SCNC: 11 MMOL/L (ref 10–20)
BUN SERPL-MCNC: 50 MG/DL (ref 6–23)
CALCIUM SERPL-MCNC: 8.6 MG/DL (ref 8.6–10.3)
CHLORIDE SERPL-SCNC: 113 MMOL/L (ref 98–107)
CO2 SERPL-SCNC: 21 MMOL/L (ref 21–32)
CREAT SERPL-MCNC: 1.8 MG/DL (ref 0.5–1.3)
EGFRCR SERPLBLD CKD-EPI 2021: 42 ML/MIN/1.73M*2
ERYTHROCYTE [DISTWIDTH] IN BLOOD BY AUTOMATED COUNT: 14.2 % (ref 11.5–14.5)
GLUCOSE SERPL-MCNC: 100 MG/DL (ref 74–99)
HCT VFR BLD AUTO: 29.2 % (ref 41–52)
HGB BLD-MCNC: 9.4 G/DL (ref 13.5–17.5)
MCH RBC QN AUTO: 29.3 PG (ref 26–34)
MCHC RBC AUTO-ENTMCNC: 32.2 G/DL (ref 32–36)
MCV RBC AUTO: 91 FL (ref 80–100)
NRBC BLD-RTO: 0 /100 WBCS (ref 0–0)
PLATELET # BLD AUTO: 326 X10*3/UL (ref 150–450)
POTASSIUM SERPL-SCNC: 4.2 MMOL/L (ref 3.5–5.3)
RBC # BLD AUTO: 3.21 X10*6/UL (ref 4.5–5.9)
SODIUM SERPL-SCNC: 141 MMOL/L (ref 136–145)
WBC # BLD AUTO: 7.6 X10*3/UL (ref 4.4–11.3)

## 2025-04-22 PROCEDURE — 36415 COLL VENOUS BLD VENIPUNCTURE: CPT | Performed by: INTERNAL MEDICINE

## 2025-04-22 PROCEDURE — 99233 SBSQ HOSP IP/OBS HIGH 50: CPT | Performed by: NURSE PRACTITIONER

## 2025-04-22 PROCEDURE — 82374 ASSAY BLOOD CARBON DIOXIDE: CPT | Performed by: INTERNAL MEDICINE

## 2025-04-22 PROCEDURE — 85027 COMPLETE CBC AUTOMATED: CPT | Performed by: INTERNAL MEDICINE

## 2025-04-22 PROCEDURE — 97530 THERAPEUTIC ACTIVITIES: CPT | Mod: GO,CO

## 2025-04-22 PROCEDURE — 2500000004 HC RX 250 GENERAL PHARMACY W/ HCPCS (ALT 636 FOR OP/ED): Performed by: INTERNAL MEDICINE

## 2025-04-22 PROCEDURE — 1200000002 HC GENERAL ROOM WITH TELEMETRY DAILY

## 2025-04-22 PROCEDURE — 2500000001 HC RX 250 WO HCPCS SELF ADMINISTERED DRUGS (ALT 637 FOR MEDICARE OP): Performed by: INTERNAL MEDICINE

## 2025-04-22 PROCEDURE — 94640 AIRWAY INHALATION TREATMENT: CPT

## 2025-04-22 PROCEDURE — 99231 SBSQ HOSP IP/OBS SF/LOW 25: CPT | Performed by: INTERNAL MEDICINE

## 2025-04-22 RX ADMIN — VANCOMYCIN 1250 MG: 1.75 INJECTION, SOLUTION INTRAVENOUS at 21:03

## 2025-04-22 RX ADMIN — CARVEDILOL 25 MG: 25 TABLET, FILM COATED ORAL at 07:53

## 2025-04-22 RX ADMIN — FUROSEMIDE 20 MG: 20 TABLET ORAL at 07:53

## 2025-04-22 RX ADMIN — TIOTROPIUM BROMIDE INHALATION SPRAY 2 PUFF: 3.12 SPRAY, METERED RESPIRATORY (INHALATION) at 08:58

## 2025-04-22 RX ADMIN — OXYCODONE HYDROCHLORIDE 5 MG: 5 TABLET ORAL at 12:06

## 2025-04-22 RX ADMIN — CARVEDILOL 25 MG: 25 TABLET, FILM COATED ORAL at 15:54

## 2025-04-22 RX ADMIN — MEROPENEM 1 G: 1 INJECTION, POWDER, FOR SOLUTION INTRAVENOUS at 23:45

## 2025-04-22 RX ADMIN — NYSTATIN 1 APPLICATION: 100000 POWDER TOPICAL at 21:03

## 2025-04-22 RX ADMIN — MEROPENEM 1 G: 1 INJECTION, POWDER, FOR SOLUTION INTRAVENOUS at 11:59

## 2025-04-22 RX ADMIN — OXYCODONE HYDROCHLORIDE 5 MG: 5 TABLET ORAL at 15:54

## 2025-04-22 RX ADMIN — FLUTICASONE FUROATE AND VILANTEROL TRIFENATATE 1 PUFF: 200; 25 POWDER RESPIRATORY (INHALATION) at 09:02

## 2025-04-22 RX ADMIN — OXYCODONE HYDROCHLORIDE 5 MG: 5 TABLET ORAL at 07:54

## 2025-04-22 ASSESSMENT — COGNITIVE AND FUNCTIONAL STATUS - GENERAL
DAILY ACTIVITIY SCORE: 14
DAILY ACTIVITIY SCORE: 16
MOBILITY SCORE: 16
HELP NEEDED FOR BATHING: A LOT
MOBILITY SCORE: 16
TOILETING: A LOT
HELP NEEDED FOR BATHING: A LOT
WALKING IN HOSPITAL ROOM: A LOT
DRESSING REGULAR LOWER BODY CLOTHING: A LOT
PERSONAL GROOMING: A LITTLE
CLIMB 3 TO 5 STEPS WITH RAILING: A LOT
TOILETING: A LITTLE
EATING MEALS: A LITTLE
MOVING TO AND FROM BED TO CHAIR: A LITTLE
DRESSING REGULAR LOWER BODY CLOTHING: A LOT
MOVING FROM LYING ON BACK TO SITTING ON SIDE OF FLAT BED WITH BEDRAILS: A LITTLE
EATING MEALS: A LITTLE
DAILY ACTIVITIY SCORE: 14
DRESSING REGULAR UPPER BODY CLOTHING: A LOT
MOVING TO AND FROM BED TO CHAIR: A LITTLE
MOVING FROM LYING ON BACK TO SITTING ON SIDE OF FLAT BED WITH BEDRAILS: A LITTLE
PERSONAL GROOMING: A LITTLE
HELP NEEDED FOR BATHING: A LOT
DRESSING REGULAR LOWER BODY CLOTHING: A LOT
DRESSING REGULAR UPPER BODY CLOTHING: A LITTLE
TOILETING: A LOT
WALKING IN HOSPITAL ROOM: A LOT
DRESSING REGULAR UPPER BODY CLOTHING: A LOT
PERSONAL GROOMING: A LITTLE
CLIMB 3 TO 5 STEPS WITH RAILING: A LOT
EATING MEALS: A LITTLE
TURNING FROM BACK TO SIDE WHILE IN FLAT BAD: A LITTLE
TURNING FROM BACK TO SIDE WHILE IN FLAT BAD: A LITTLE
STANDING UP FROM CHAIR USING ARMS: A LITTLE
STANDING UP FROM CHAIR USING ARMS: A LITTLE

## 2025-04-22 ASSESSMENT — PAIN SCALES - GENERAL
PAINLEVEL_OUTOF10: 0 - NO PAIN
PAINLEVEL_OUTOF10: 7
PAINLEVEL_OUTOF10: 0 - NO PAIN
PAINLEVEL_OUTOF10: 7
PAINLEVEL_OUTOF10: 6
PAINLEVEL_OUTOF10: 0 - NO PAIN
PAINLEVEL_OUTOF10: 7

## 2025-04-22 ASSESSMENT — PAIN - FUNCTIONAL ASSESSMENT
PAIN_FUNCTIONAL_ASSESSMENT: 0-10

## 2025-04-22 ASSESSMENT — PAIN DESCRIPTION - DESCRIPTORS
DESCRIPTORS: ACHING

## 2025-04-22 NOTE — PROGRESS NOTES
Colten Eason is a 64 y.o. male on day 2 of admission presenting with Open wound of right lower extremity, initial encounter.    Subjective   Interval History:    Patient seen and examined  Right thigh/knee wound pain controlled  Wife present  No chest pain or shortness of breath.  No nausea vomiting or diarrhea    Review of Systems   All other systems reviewed and are negative.      Objective   Range of Vitals (last 24 hours)  Heart Rate:  [64-85]   Temp:  [36.5 °C (97.7 °F)-36.8 °C (98.2 °F)]   Resp:  [17-18]   BP: (127-149)/(75-78)   SpO2:  [95 %-97 %]   Daily Weight  04/20/25 : 132 kg (291 lb 0.1 oz)    Body mass index is 44.25 kg/m².    Physical Exam  Constitutional:       Appearance: Normal appearance.   HENT:      Head: Normocephalic and atraumatic.      Nose: Nose normal.   Eyes:      General: No scleral icterus.     Extraocular Movements: Extraocular movements intact.      Conjunctiva/sclera: Conjunctivae normal.   Cardiovascular:      Rate and Rhythm: Normal rate and regular rhythm.      Heart sounds: Normal heart sounds.   Pulmonary:      Effort: Pulmonary effort is normal.      Breath sounds: Normal breath sounds.   Abdominal:      General: Bowel sounds are normal.      Palpations: Abdomen is soft.      Tenderness: There is no abdominal tenderness.   Musculoskeletal:      Cervical back: Normal range of motion and neck supple.      Right lower leg: Edema present.      Left lower leg: Edema present.   Skin:     General: Skin is warm and dry.      Comments: Right thigh/knee dressing   Neurological:      Mental Status: He is alert and oriented to person, place, and time.   Psychiatric:         Behavior: Behavior normal.        Antibiotics  meropenem - 1 gram/100 mL  vancomycin - 1.25 gram/250 mL    Relevant Results  Labs  Results from last 72 hours   Lab Units 04/22/25  0638 04/21/25  0552 04/20/25  1721   WBC AUTO x10*3/uL 7.6 8.5 9.1   HEMOGLOBIN g/dL 9.4* 9.7* 9.9*   HEMATOCRIT % 29.2* 30.6* 30.5*    PLATELETS AUTO x10*3/uL 326 314 332   NEUTROS PCT AUTO %  --   --  64.1   LYMPHS PCT AUTO %  --   --  16.0   MONOS PCT AUTO %  --   --  11.1   EOS PCT AUTO %  --   --  3.1     Results from last 72 hours   Lab Units 04/22/25  0638 04/21/25  0552 04/20/25  1721   SODIUM mmol/L 141 141 140   POTASSIUM mmol/L 4.2 4.1 4.4   CHLORIDE mmol/L 113* 111* 108*   CO2 mmol/L 21 21 24   BUN mg/dL 50* 50* 60*   CREATININE mg/dL 1.80* 1.88* 2.06*   GLUCOSE mg/dL 100* 95 114*   CALCIUM mg/dL 8.6 8.2* 8.3*   ANION GAP mmol/L 11 13 12   EGFR mL/min/1.73m*2 42* 40* 36*     Results from last 72 hours   Lab Units 04/20/25  1721   ALK PHOS U/L 64   BILIRUBIN TOTAL mg/dL 0.4   PROTEIN TOTAL g/dL 6.2*   ALT U/L 16   AST U/L 15   ALBUMIN g/dL 3.1*     Estimated Creatinine Clearance: 55.7 mL/min (A) (by C-G formula based on SCr of 1.8 mg/dL (H)).  C-Reactive Protein   Date Value Ref Range Status   04/20/2025 11.16 (H) <1.00 mg/dL Final     Microbiology  Reviewed-blood/wound cultures  Imaging  CT femur right wo IV contrast  Result Date: 4/20/2025  STUDY: CT Extremity; Completed Time:  04/20/2025 at 5:58 PM INDICATION: Right knee/femur wound. Assess for hematoma vs. abscess. COMPARISON: Correlation with CT right knee imaging of same date, 04/20/25. CTA lower extremity 04/09/25. ACCESSION NUMBER(S): [id number] ORDERING CLINICIAN: MATA MENCHACA TECHNIQUE:  Thin section axial images were obtained through the right femur without intravenous contrast.  Orthogonal reconstructed images were obtained and reviewed.  Automated mA/kV exposure control was utilized and patient examination was performed in strict accordance with principles of ALARA. FINDINGS:  There is no acute fracture or malalignment.  There is no cortical erosion or periosteal reaction.  There is joint effusion of the knee.  Femoral acetabular joint alignment is maintained with mild to moderate arthrosis.  Visualized portions of the pelvis are intact. There is distal soft tissue  density suggesting hematoma and/or abscess.  There are small locules of gas along the medial aspect of the knee as described on CT.  There is medial induration, edema and skin thickening.  Cellulitis extends along from the anterior aspect of the posterior medial aspect of the thigh.  There is soft tissue densities in the distal medial soft tissues with dominant density measuring 10.1 x 6.2 x 2.5 cm (image 43 series 12 B and image 221 axial images).  On prior CT, this measured 12.2 x 6.8 x 3.5 cm. Muscle configuration and attenuation normal.  Fascial planes are preserved.. Images through the pelvic viscera are unremarkable.    Distal inflammatory changes and cellulitis extending into the proximal medial thigh. Soft tissue density along the distal medial subcutaneous suggesting hematoma is decreased compared with prior CT.  Superimposed infection is not entirely excluded however there is no gas or drainable fluid component.. No evidence of myositis or acute osteomyelitis. Signed by Dejon Winkler, DO    CT knee right wo IV contrast  Result Date: 4/20/2025  INDICATION:  Right knee wound, assess for hematoma versus abscess. TECHNIQUE:  Thin section axial images were obtained through the right knee without intravenous contrast.  Orthogonal reconstructed images were obtained and reviewed.  Automated mA/kV exposure control was utilized and patient examination was performed in strict accordance with principles of ALARA. RADIATION AMOUNT:  459 mGy-cm. COMPARISON:  MR Right knee 04/13/2025; CT Knee 04/09/2025. FINDINGS:  There is no acute fracture or malalignment.  Tibial plateau is intact.  There is no osteochondral defect.  There is no cortical erosion or periosteal reaction.  Patellofemoral joint alignment is maintained. There is small joint effusion.  There is no intra-articular gas. There is significant induration, edema and inflammatory changes with anterior and medial soft tissue density suggesting abscess extending  from the level of the distal femur through the proximal diaphysis of the tibia measuring 7.9 x 2.0 x 10.4 cm with increased attenuation suggesting hematoma.  This appears increased in size compared with prior MRI, however on CT performed 4/9/2025, this is seen measuring 15.9 x 4.2 x 12.0 cm.  There are locules of gas along the medial soft tissues (image 9 through 33 axial images).  There is edema in the anterior compartment of the calf suggesting myositis.  Subcutaneous plantar changes and skin thickening extend in the anterior aspect of the lower extremity.    Anterior large soft tissue density concerning for hematoma which appears increased compared prior MRI however is decreased in size compared with prior CT.  Overall, the appearance suggest hematoma. Superimposed infection is not entirely excluded however there is no locules of gas or focal drainable fluid component. Locules of gas along the medial aspect of the knee suggesting soft tissue infection. Myositis in the anterior compartment of the calf.  Cellulitis is described. No acute fracture or malalignment. Small joint effusion.  No evidence of intra-articular gas or septic arthritis. Signed by Dejon Winkler,     MR knee right wo IV contrast  Result Date: 4/13/2025  Interpreted By:  Ignacio Agosto, STUDY: MRI of the right knee without contrast dated 4/13/2025.   INDICATION: Signs/Symptoms:R quadricep tendon injury   COMPARISON: None.   ACCESSION NUMBER(S): [id number]   ORDERING CLINICIAN: HORTENCIA LEBRON   TECHNIQUE: Multiplanar multisequence MRI of the right knee was performed without intravenous contrast.   FINDINGS: MUSCLES, TENDONS, AND LIGAMENTS:   The anterior cruciate ligament is intact.  The posterior cruciate ligament is intact. The medial collateral ligament is intact. The lateral collateral ligament complex is intact. The popliteus and biceps femoris tendons, iliotibial band, and extensor mechanism are intact.   MENISCI:   The medial  meniscus is intact.  The lateral meniscus is intact.   OSSEOUS STRUCTURES AND JOINTS:   No fracture or dislocation is evident. There is marginal femorotibial and patellofemoral osteophyte formation. As seen on this exam of the hyaline articular cartilage in the femorotibial joint spaces is intact. There is full-thickness fissuring/loss of hyaline articular cartilage of the patellar apex with some foci of mild-to-moderate partial-thickness fissuring of the hyaline articular cartilage of the medial and lateral facets of the patella. There is a moderate volume knee joint effusion.   SOFT TISSUES:   No significant volume of fluid is evident in a popliteal cyst. Extensive reticular increased T2 signal intensity is seen in the subcutaneous tissues. In the subcutaneous tissues of the anterior to anteromedial proximal leg there is a fusiform heterogeneous collection measuring up to approximately 1.5 x 9.3 x 5.8 cm. It is predominantly hypointense on T2 weighted imaging with regions of some higher signal intensity, and heterogeneous with regions of hyperintensity and hypointensity on T1 weighted imaging.       1. Findings most compatible with evolving hematoma in the subcutaneous tissues of the anterior to anteromedial proximal leg. 2. Diffuse reticular increased T2 signal intensity in the subcutaneous tissues which may represent lymphedema and/or cellulitis. 3. As seen on this examination of the quadriceps tendon appears to be intact. If there is concern for compromise of the quadriceps more proximally in the thigh, thigh MRI is recommended. 4. Moderate volume knee joint effusion. 5. Mild-to-moderate degenerative change of the knee.   Signed by: Ignacio Agosto 4/13/2025 1:53 PM Dictation workstation:   [id number]    CT angio lower extremity right w and or wo IV contrast  Result Date: 4/9/2025  STUDY: CT CTA LOWER EXTREMITY RIGHT; 04/09/2025 06:01 PM INDICATION:  Right lower extremity hematoma, evaluate for vessel  dissection. COMPARISON: CT Knee 04/09/2025; XR Femur 04/09/2025. ACCESSION NUMBER(S): [id number] ORDERING CLINICIAN: TECHNIQUE:  Helical CT is performed from the infrarenal aorta through the feet after bolus administration of 120 mL of Omnipaque 350. Images are reviewed and processed at a workstation according to the CT angiogram protocol with 3-D and/or MIP post processing imaging generated.  2236 DICOM images received. Automated mA/kV exposure control was utilized and patient examination was performed in strict accordance with principles of ALARA. FINDINGS: No stenoses are seen within the visualized portions of the right superficial femoral artery, or within the right popliteal artery.  No intimal flap is visualized to indicate a dissection.  There are small contrast blush is seen in the hematomas in the medial aspect of the distal right thigh, and anterior medial aspect of the right knee.  It is indeterminate whether or not there are branches from the profunda femoral artery or the geniculate branches from the popliteal artery are contributing to these hematomas.    1.  Contrast blushing is in the two hematomas located in the medial aspect of the distal right thigh and anteromedial aspect of the right knee, suggestive of an arterial bleed.  It is indeterminate whether or not these are contributed by branches of the profunda femoral artery, or the geniculate arteries. 2.  No stenoses or dissections within the visualized portions of the right superficial femoral artery, or right popliteal artery. This report was called to Dr. Ricardo Cook at 7:40 PM on April 9, 2025. Signed by Nik Hart MD    XR hand right 3+ views  Result Date: 4/9/2025  Interpreted By:  Ludwig Briggs, STUDY: XR HAND RIGHT 3+ VIEWS   INDICATION: Signs/Symptoms:right hand pain.   COMPARISON: None   ACCESSION NUMBER(S): [id number]   ORDERING CLINICIAN: QIAN DELAROSA   FINDINGS: Mild osteoarthritis right hand more advanced at the 1st [facility]. No  evidence of fracture or malalignment       Osteoarthritis right hand most prominent at the 1st [facility].   Signed by: Ludwig Briggs 4/9/2025 5:28 PM Dictation workstation:   [id number]    CT knee right wo IV contrast  Result Date: 4/9/2025  STUDY: CT Extremity; Completed Time:  4/9/2025 3:12 PM INDICATION: Evaluate for soft tissue hematoma. COMPARISON: XR RT femur 4/9/2025. ACCESSION NUMBER(S): [id number] ORDERING CLINICIAN: QIAN DELAROSA TECHNIQUE:  Thin section axial images were obtained through the right knee without intravenous contrast.  Orthogonal reconstructed images were obtained and reviewed.  Automated mA/kV exposure control was utilized and patient examination was performed in strict accordance with principles of ALARA. FINDINGS: No acute fractures or dislocations are visualized within the right knee.  There is no evidence of patellar subluxation.  No suprapatellar effusion is appreciated.  There is prominent soft tissue swelling over the anterior aspect of the knee.  There is a hyperdense fluid collection, indicative of a hematoma.  It is estimated at 14.5 x 9.9 x 5.2 cm.  There is impingement noted upon the vastus medialis of the quadriceps muscle.  However, this does not appear to involve the musculature.  There is a second hematoma over the medial aspect the distal thigh, measured at 11.7 x 8.6 x 2.7 cm.  There is generalized subcutaneous edema over the knee.    1.  Large hematoma over the anteromedial aspect of the right knee. 2.  Additional hematoma over the medial aspect of the distal right thigh. 3.  No acute osseous findings involving the right knee. 4.  No prominent hematoma appreciated within the surrounding musculature.  5.  Subcutaneous edema overlying the right knee. Signed by Nik Hart MD    XR femur right 2+ views  Result Date: 4/9/2025  STUDY: Femur Radiographs; 4/9/2025 12:50 PM INDICATION: Right thigh pain. COMPARISON: None Available. ACCESSION NUMBER(S): [id number] ORDERING  CLINICIAN: QIAN DELAROSA TECHNIQUE:  Two view(s) of the right femur (five images). FINDINGS:  There is no displaced fracture.  The alignment is anatomic.  Narrowing of the right hip joint space.  Prominent soft tissue swelling of the distal thigh and knee.    No acute bone or joint abnormality. Soft tissue swelling. DJD. Signed by Deniz Benson MD    XR knee right 4+ views  Result Date: 4/9/2025  Interpreted By:  Ca Viera, STUDY: XR KNEE RIGHT 4+ VIEWS;  4/9/2025 12:45 pm   INDICATION: Signs/Symptoms:right knee pain.     COMPARISON: None.   ACCESSION NUMBER(S): [id number]   ORDERING CLINICIAN: QIAN DELAROSA   FINDINGS: Five views of the right knee obtained.   No acute fracture or osseous displacement. Mild medial compartment narrowing. Minimal lateral and patellofemoral compartment spurring. Prominent anterior/prepatellar soft tissue swelling with possible subtle nodularity. Faint linear presumed vascular calcifications in the posterior soft tissues       No definite acute osseous finding. Mild degenerative changes. Prominent anterior soft tissue swelling with possible nodularity.   MACRO: None.   Signed by: Ca Viera 4/9/2025 12:56 PM Dictation workstation:   [id number]    CT head wo IV contrast  Result Date: 4/9/2025  Interpreted By:  Adal Landeros, STUDY: CT HEAD WO IV CONTRAST;  4/9/2025 12:42 pm   INDICATION: Signs/Symptoms:fall, hit head.   COMPARISON: None.   ACCESSION NUMBER(S): [id number]   ORDERING CLINICIAN: QIAN DELAROSA   TECHNIQUE: Noncontrast axial CT scan of head was performed. Angled reformats in brain and bone windows were generated. The images were reviewed in bone, brain, blood and soft tissue windows.   FINDINGS: CSF Spaces: The ventricles, sulci and basal cisterns are within normal limits. There is no extraaxial fluid collection.   Parenchyma: Moderate parenchymal atrophy. Periventricular and subcortical white matter hypoattenuation is nonspecific, however likely reflects chronic  microvascular ischemic versus chronic hypertensive changes. The grey-white differentiation is intact. There is no mass effect or midline shift.  There is no intracranial hemorrhage. Minimal density along the periphery of the bifrontal regions (series 4, images 20 9-38) is favored to be secondary to a mixture of motion artifact and localized beam hardening artifact   Calvarium: No acute displaced calvarial fracture. Infiltration of the right frontal scalp suggestive of localized soft tissue injury.   Paranasal sinuses and mastoids: Mastoid air cells appear clear. 2.4 x 2.0 x 2.8 cm rounded cystic lesion demonstrating peripheral calcification in the region peripheral to the fossa of Rosenmuller. Presumed cerumen within the bilateral external auditory canals. Mild mucosal thickening of the bilateral ethmoid and right frontal sinus.       No evidence of acute cortical infarct or intracranial hemorrhage.   2.4 x 2.0 x 2.8 cm rounded cystic lesion containing peripheral calcification in the region peripheral to the fossa of Rosenmuller. Follow-up nonemergent MRI soft tissue neck protocol advised for further assessment.   MACRO: None     Signed by: Adal Landeros 4/9/2025 12:53 PM Dictation workstation:   [id number]      Assessment/Plan   Infected right thigh/knee hematoma  Right thigh hematoma s/p evacuation  Chronic kidney disease stage III     Continue vancomycin  Continue Zosyn  Follow-up wound culture  Local care  Monitor renal function  Supportive care  Monitor temperature and WBC  Interventional radiology consult  Further recommendations based on culture results    I spent 25 minutes in the professional and overall care of this patient.      Denis Higgins MD

## 2025-04-22 NOTE — CARE PLAN
The clinical goals for the shift include pain control, safety      Problem: Pain - Adult  Goal: Verbalizes/displays adequate comfort level or baseline comfort level  Outcome: Progressing     Problem: Safety - Adult  Goal: Free from fall injury  Outcome: Progressing     Problem: Discharge Planning  Goal: Discharge to home or other facility with appropriate resources  Outcome: Progressing

## 2025-04-22 NOTE — PROGRESS NOTES
04/22/25 1459   Discharge Planning   Expected Discharge Disposition Home H     Tcc Spoke to patient, patient would like to go home instead of return to rehab facility   TCC to reach out to [employer]ia to find out process with workers comp

## 2025-04-22 NOTE — PROGRESS NOTES
Physical Therapy                 Therapy Communication Note    Patient Name: Colten Eason  MRN: [id number]  Department: [address]  Room: [address] [place] [address]  Today's Date: 4/22/2025     Discipline: Physical Therapy    PT Missed Visit: Yes     Missed Visit Reason: Patient refused   (Patient pleasantly declining mobility this afternoon.)    Missed Time: Attempt    Comment:

## 2025-04-22 NOTE — PROGRESS NOTES
Colten Eason is a 64 y.o. male on day 2 of admission presenting with Open wound of right lower extremity, initial encounter.      Subjective   Treating for cellulitis and surgical site infection involving his right leg.  This occurring about 3 weeks after he had a deep tissue hematoma in this area that required surgical incision and drainage.  He has been on broad-spectrum antibiotics for about 48 hours and he is feeling better.  Vascular surgery has been following and they do not feel there is any surgical intervention needed.  Thus far cultures are negative.       Objective   Alert oriented nondistressed  The right leg extensively wrapped.  I did not unwrap it.  Last Recorded Vitals  /77 (BP Location: Left arm, Patient Position: Lying)   Pulse 64   Temp 36.5 °C (97.7 °F) (Axillary)   Resp 17   Wt 132 kg (291 lb 0.1 oz)   SpO2 94%   Intake/Output last 3 Shifts:    Intake/Output Summary (Last 24 hours) at 4/22/2025 1239  Last data filed at 4/22/2025 0500  Gross per 24 hour   Intake 420 ml   Output 2100 ml   Net -1680 ml       Admission Weight  Weight: 132 kg (291 lb 0.1 oz) (04/20/25 1648)    Daily Weight  04/20/25 : 132 kg (291 lb 0.1 oz)    Image Results  CT femur right wo IV contrast  Narrative: STUDY:  CT Extremity; Completed Time:  04/20/2025 at 5:58 PM  INDICATION:  Right knee/femur wound. Assess for hematoma vs. abscess.  COMPARISON:  Correlation with CT right knee imaging of same date, 04/20/25. CTA  lower extremity 04/09/25.  ACCESSION NUMBER(S):  [id number]  ORDERING CLINICIAN:  MATA MENCHACA  TECHNIQUE:  Thin section axial images were obtained through the right  femur without intravenous contrast.  Orthogonal reconstructed images  were obtained and reviewed.    Automated mA/kV exposure control was utilized and patient examination  was performed in strict accordance with principles of ALARA.  FINDINGS:  There is no acute fracture or malalignment.  There is no  cortical erosion or periosteal  reaction.  There is joint effusion of  the knee.    Femoral acetabular joint alignment is maintained with mild to moderate  arthrosis.  Visualized portions of the pelvis are intact.  There is distal soft tissue density suggesting hematoma and/or  abscess.  There are small locules of gas along the medial aspect of  the knee as described on CT.  There is medial induration, edema and  skin thickening.  Cellulitis extends along from the anterior aspect of  the posterior medial aspect of the thigh.  There is soft tissue  densities in the distal medial soft tissues with dominant density  measuring 10.1 x 6.2 x 2.5 cm (image 43 series 12 B and image 221  axial images).  On prior CT, this measured 12.2 x 6.8 x 3.5 cm.   Muscle configuration and attenuation normal.  Fascial planes are  preserved..  Images through the pelvic viscera are unremarkable.  Impression: Distal inflammatory changes and cellulitis extending into the proximal  medial thigh.  Soft tissue density along the distal medial subcutaneous suggesting  hematoma is decreased compared with prior CT.  Superimposed infection  is not entirely excluded however there is no gas or drainable fluid  component..  No evidence of myositis or acute osteomyelitis.  Signed by Dejon Winkler, DO  CT knee right wo IV contrast  Narrative: INDICATION:  Right knee wound, assess for hematoma versus abscess.  TECHNIQUE:  Thin section axial images were obtained through the right  knee without intravenous contrast.  Orthogonal reconstructed images  were obtained and reviewed.    Automated mA/kV exposure control was utilized and patient examination  was performed in strict accordance with principles of ALARA.  RADIATION AMOUNT:  459 mGy-cm.  COMPARISON:  MR Right knee 04/13/2025; CT Knee 04/09/2025.  FINDINGS:    There is no acute fracture or malalignment.  Tibial plateau is intact.   There is no osteochondral defect.  There is no cortical erosion or  periosteal reaction.   Patellofemoral joint alignment is maintained.   There is small joint effusion.  There is no intra-articular gas.  There is significant induration, edema and inflammatory changes with  anterior and medial soft tissue density suggesting abscess extending  from the level of the distal femur through the proximal diaphysis of  the tibia measuring 7.9 x 2.0 x 10.4 cm with increased attenuation  suggesting hematoma.  This appears increased in size compared with  prior MRI, however on CT performed 4/9/2025, this is seen measuring  15.9 x 4.2 x 12.0 cm.  There are locules of gas along the medial soft  tissues (image 9 through 33 axial images).  There is edema in the  anterior compartment of the calf suggesting myositis.  Subcutaneous  plantar changes and skin thickening extend in the anterior aspect of  the lower extremity.  Impression: Anterior large soft tissue density concerning for hematoma which  appears increased compared prior MRI however is decreased in size  compared with prior CT.  Overall, the appearance suggest hematoma.   Superimposed infection is not entirely excluded however there is no  locules of gas or focal drainable fluid component.  Locules of gas along the medial aspect of the knee suggesting soft  tissue infection.  Myositis in the anterior compartment of the calf.  Cellulitis is  described.  No acute fracture or malalignment.  Small joint effusion.  No evidence of intra-articular gas or septic  arthritis.  Signed by Dejon Winkler, DO                       Assessment & Plan    Right leg cellulitis and surgical site infection.  Appreciate ongoing evaluation and wound care from vascular surgery.  Broad-spectrum antibiotics for now.  Of note last time he was hospitalized he was discharged from here to a skilled nursing facility.  The patient does not want to go back there.  He is not quite sure if he can function independently at home.  I suspect he probably could if he and his wife were determined to  have him home.  Will have to discuss this with the  as they seem to be of the belief that the patient plans on going back to the skilled facility from which he came.           Dejon Strong MD

## 2025-04-22 NOTE — PROGRESS NOTES
Colten Eason is a 64 y.o. male on day 2 of admission presenting with Open wound of right lower extremity, initial encounter.      Subjective  Patient evaluated bedside with Dr. Tao present.  His wife was present as well  Right thigh with resolving erythema present, a little less tender  Okay to weight-bear as tolerated  Dressing changed  Dressing orders placed  Plan for follow-up as an outpatient    Objective        Last Recorded Vitals      4/20/2025    11:15 PM 4/21/2025    12:37 AM 4/21/2025     7:00 AM 4/21/2025     3:00 PM 4/21/2025     7:00 PM 4/22/2025     7:53 AM 4/22/2025     8:22 AM   Vitals   Systolic  147 154 149 127  140   Diastolic  70 84 78 75  77   BP Location   Left arm Left arm Left arm  Left arm   Heart Rate 73 68 73 85 78 80 64   Temp  36.4 °C (97.5 °F) 36.9 °C (98.4 °F) 36.8 °C (98.2 °F) 36.8 °C (98.2 °F)  36.5 °C (97.7 °F)   Resp  18 18 18 18  17       Imaging  Imaging  CT femur right wo IV contrast  Result Date: 4/20/2025  Distal inflammatory changes and cellulitis extending into the proximal medial thigh. Soft tissue density along the distal medial subcutaneous suggesting hematoma is decreased compared with prior CT.  Superimposed infection is not entirely excluded however there is no gas or drainable fluid component.. No evidence of myositis or acute osteomyelitis. Signed by Dejon Winkler, DO    CT knee right wo IV contrast  Result Date: 4/20/2025  Anterior large soft tissue density concerning for hematoma which appears increased compared prior MRI however is decreased in size compared with prior CT.  Overall, the appearance suggest hematoma. Superimposed infection is not entirely excluded however there is no locules of gas or focal drainable fluid component. Locules of gas along the medial aspect of the knee suggesting soft tissue infection. Myositis in the anterior compartment of the calf.  Cellulitis is described. No acute fracture or malalignment. Small joint effusion.  No  evidence of intra-articular gas or septic arthritis. Signed by Dejon Winkler,       Cardiology, Vascular, and Other Imaging  No other imaging results found for the past 2 days        Relevant Results  Results for orders placed or performed during the hospital encounter of 04/20/25 (from the past 24 hours)   CBC   Result Value Ref Range    WBC 7.6 4.4 - 11.3 x10*3/uL    nRBC 0.0 0.0 - 0.0 /100 WBCs    RBC 3.21 (L) 4.50 - 5.90 x10*6/uL    Hemoglobin 9.4 (L) 13.5 - 17.5 g/dL    Hematocrit 29.2 (L) 41.0 - 52.0 %    MCV 91 80 - 100 fL    MCH 29.3 26.0 - 34.0 pg    MCHC 32.2 32.0 - 36.0 g/dL    RDW 14.2 11.5 - 14.5 %    Platelets 326 150 - 450 x10*3/uL   Basic Metabolic Panel   Result Value Ref Range    Glucose 100 (H) 74 - 99 mg/dL    Sodium 141 136 - 145 mmol/L    Potassium 4.2 3.5 - 5.3 mmol/L    Chloride 113 (H) 98 - 107 mmol/L    Bicarbonate 21 21 - 32 mmol/L    Anion Gap 11 10 - 20 mmol/L    Urea Nitrogen 50 (H) 6 - 23 mg/dL    Creatinine 1.80 (H) 0.50 - 1.30 mg/dL    eGFR 42 (L) >60 mL/min/1.73m*2    Calcium 8.6 8.6 - 10.3 mg/dL       Physical exam  Constitutional:  Alert and oriented to person, place, date/time in no acute distress.  HEENT:  Atraumatic, normocephalic. PERRL. EOMI.  Nares patent.  Mucous membranes moist.    Neck:  Trachea midline.  Respiratory:  Clear to auscultation.  Cardiac:  Regular rate and rhythm.    Cardiovascular:  No edema of the extremities.  Pulse exam: PT pulses palpable  Abdominal:  Soft, nontender, nondistended, bowel sounds present.  Morbidly obese  Musculoskeletal:  Moves extremities freely.  Right thigh tenderness  Dermatological: Right thigh incision site well coapted.  Large area of tissue necrosis noted to the medial knee and thigh with resolving erythema, diffuse ecchymosis present.  Tender with palpation.  Some serous drainage noted by the incision site.  No fluctuance.  No crepitus.  Neurological: Alert and oriented to person, place, date/time  Psych:  Calm,  cooperative     Assessment and Plan  Right thigh hematoma  Right thigh cellulitis  Right thigh pain  CKD stage III     64-year-old male who underwent right thigh hematoma evacuation by Dr. Tao on 4/10/2025.  He had a KATTY drain that was placed during surgery which was removed prior to discharge.  The CT noted large soft tissue density concerning for hematoma increased from prior MRI however decrease in size from prior CT.      Continue local wound care with Xeroform, dry sterile dressing and Ace wrap.  Orders placed.    Evaluated at bedside with Dr. Tao on 4/22/2025.  Right thigh with less erythema present.  Discussed patient with IR on 4/21/2025, 2 small locules of air noted on CT but does not appear to be any associated collection for drainage, advised against pigtail catheter placement due to likely catheter occlusion from hematoma, advised to continue surveillance at this time.  No plans for surgical intervention from our standpoint, thigh is improving.  Recommend patient follow-up within 1 to 2 weeks with our service.  Orders placed.

## 2025-04-22 NOTE — PROGRESS NOTES
Occupational Therapy    OT Treatment    Patient Name: Colten Eason  MRN: [id number]  Department: [facility]  Room: [facility]429-  Today's Date: 4/22/2025  Time Calculation  Start Time: 1412  Stop Time: 1422  Time Calculation (min): 10 min        Assessment:  OT Assessment: Unable to show progress towards OT goals this date d/t education provided this date. Continue with current OT POC to increase strength, balance and functional tolerance to maximize safety and independence during ADLs.  Barriers to Discharge Home: Caregiver assistance, Physical needs  Caregiver Assistance: Caregiver assistance needed per identified barriers - however, level of patient's required assistance exceeds assistance available at home  Physical Needs: Stair navigation into home limited by function/safety, Ambulating household distances limited by function/safety, Intermittent mobility assistance needed, Intermittent ADL assistance needed, High falls risk due to function or environment  Evaluation/Treatment Tolerance: Patient limited by fatigue, Patient limited by pain  End of Session Communication: Bedside nurse  End of Session Patient Position: Bed, 3 rail up, Alarm off, not on at start of session (all needs in reach)  OT Assessment Results: Decreased ADL status, Decreased upper extremity strength, Decreased endurance, Decreased safe judgment during ADL, Decreased functional mobility, Decreased IADLs  Barriers to Discharge: Decreased caregiver support, Inaccessible home environment  Evaluation/Treatment Tolerance: Patient limited by fatigue, Patient limited by pain  Plan:  Treatment Interventions: ADL retraining, Functional transfer training, UE strengthening/ROM, Endurance training, Patient/family training, Equipment evaluation/education, Compensatory technique education  OT Frequency: 3 times per week  OT Discharge Recommendations: Moderate intensity level of continued care  Equipment Recommended upon Discharge: Wheeled walker  OT Recommended  Transfer Status: Minimal assist  OT - OK to Discharge: Yes  Treatment Interventions: ADL retraining, Functional transfer training, UE strengthening/ROM, Endurance training, Patient/family training, Equipment evaluation/education, Compensatory technique education    Subjective   Previous Visit Info:  OT Last Visit  OT Received On: 04/22/25  General:  General  Reason for Referral: activities of daily living. R LE wound  Past Medical History Relevant to Rehab: CKD, COPD, Lt ventricle hypertrophy, RLE hematoma  Family/Caregiver Present: Yes  Caregiver Feedback: spouse present- questions, comments and concerns addressed regarding OT.  Prior to Session Communication: Bedside nurse  Patient Position Received: Bed, 3 rail up, Alarm off, not on at start of session  General Comment: Pt cleared for therapy session per nursing, pt reporting increased pain and fatigue from walking earlier this date and pleasantly declined OOB activity. Education provided to pt and spouse to addressed questions, comments and concerns regarding OT.  Precautions:  Hearing/Visual Limitations: WFL  LE Weight Bearing Status: Weight Bearing as Tolerated (RLE)  Medical Precautions: Fall precautions  Precautions Comment: dressing intact to RLE wound     Date/Time Vitals Session Patient Position Pulse Resp SpO2 BP MAP (mmHg)    04/22/25 1647 --  --  90  18  95 %  137/89  99                 Pain:  Pain Assessment  Pain Assessment: 0-10  0-10 (Numeric) Pain Score: 6  Pain Type: Acute pain  Pain Location: Leg  Pain Orientation: Right, Upper  Clinical Progression: Not changed  Pain Interventions: Emotional support  Response to Interventions: No change in pain    Objective    Cognition:  Cognition  Overall Cognitive Status: Within Functional Limits  Orientation Level: Oriented X4  Safety/Judgement: Exceptions to WFL  Insight: Mild  Processing Speed: Within funtional limits  Coordination:  Movements are Fluid and Coordinated: No  Upper Body Coordination:  delayed rate and accuracy of movement  Lower Body Coordination: slower rate of movement  Activities of Daily Living: Grooming  Grooming Comments: pt declined participation in ADLs despite education and encouragement citing fatigue.    UE Bathing  UE Bathing Comments: Education provided on AE/AD upon home-going such as long-handle sponge, hand held shower head and shower chair to maximize independence during task completion.  Functional Standing Tolerance:     Bed Mobility/Transfers: Bed Mobility  Bed Mobility:  (pt declined OOB activity despite education and encouragement)    Transfers  Transfer: No    IADL's: Meal Prep  Meal Preparation: EC/WS briefly reviewed for meal preparation, kitchen mobility and light housekeeping.    Therapy/Activity: Therapeutic Activity  Therapeutic Activity Performed: Yes  Therapeutic Activity 1: Education provided to patient and spouse on EC/WS, importance of activity/ rest routine, AE/AD recommendation upon home-going to maximize safety and independence, and HHOT vs SNF. Questions, comments and concerns addressed at this time regarding OT.      Outcome Measures:[facility] Daily Activity  Putting on and taking off regular lower body clothing: A lot  Bathing (including washing, rinsing, drying): A lot  Putting on and taking off regular upper body clothing: A little  Toileting, which includes using toilet, bedpan or urinal: A little  Taking care of personal grooming such as brushing teeth: A little  Eating Meals: A little  Daily Activity - Total Score: 16        Education Documentation  ADL Training, taught by MICK Gong at 4/22/2025  5:08 PM.  Learner: Significant Other, Patient  Readiness: Acceptance  Method: Explanation, Demonstration  Response: Needs Reinforcement  Comment: importance of activity/ rest routine    Education Comments  Education provided on role of OT/POC, safety awareness throughout functional tasks/transfers, importance of activity/ rest routine, EC/WS  techniques, and use of call light for assistance. Questions, comments and concerns addressed regarding OT.      Goals:  Encounter Problems       Encounter Problems (Active)       OT Goals       ADLs (Progressing)       Start:  04/21/25    Expected End:  05/16/25       Pt will complete ADL tasks with Mod I, using AE as needed, in order to complete self-care tasks.           Functional transfers (Progressing)       Start:  04/21/25    Expected End:  05/16/25       Pt will perform functional transfers at mod ind level with RW           Functional mobility (Progressing)       Start:  04/21/25    Expected End:  05/16/25       Pt will perform functional mobility household distance at mod ind level with RW

## 2025-04-22 NOTE — CARE PLAN
The patient's goals for the shift include maintain safety, wound care    The clinical goals for the shift include pain control, safety

## 2025-04-23 LAB
ANION GAP SERPL CALCULATED.3IONS-SCNC: 10 MMOL/L (ref 10–20)
BACTERIA SPEC CULT: NORMAL
BUN SERPL-MCNC: 55 MG/DL (ref 6–23)
CALCIUM SERPL-MCNC: 8.7 MG/DL (ref 8.6–10.3)
CHLORIDE SERPL-SCNC: 111 MMOL/L (ref 98–107)
CO2 SERPL-SCNC: 22 MMOL/L (ref 21–32)
CREAT SERPL-MCNC: 2.05 MG/DL (ref 0.5–1.3)
EGFRCR SERPLBLD CKD-EPI 2021: 36 ML/MIN/1.73M*2
ERYTHROCYTE [DISTWIDTH] IN BLOOD BY AUTOMATED COUNT: 14.4 % (ref 11.5–14.5)
GLUCOSE SERPL-MCNC: 106 MG/DL (ref 74–99)
GRAM STN SPEC: NORMAL
GRAM STN SPEC: NORMAL
HCT VFR BLD AUTO: 31.2 % (ref 41–52)
HGB BLD-MCNC: 10 G/DL (ref 13.5–17.5)
MCH RBC QN AUTO: 29.3 PG (ref 26–34)
MCHC RBC AUTO-ENTMCNC: 32.1 G/DL (ref 32–36)
MCV RBC AUTO: 92 FL (ref 80–100)
NRBC BLD-RTO: 0 /100 WBCS (ref 0–0)
PLATELET # BLD AUTO: 295 X10*3/UL (ref 150–450)
POTASSIUM SERPL-SCNC: 4.3 MMOL/L (ref 3.5–5.3)
RBC # BLD AUTO: 3.41 X10*6/UL (ref 4.5–5.9)
SODIUM SERPL-SCNC: 139 MMOL/L (ref 136–145)
WBC # BLD AUTO: 8.3 X10*3/UL (ref 4.4–11.3)

## 2025-04-23 PROCEDURE — 2500000001 HC RX 250 WO HCPCS SELF ADMINISTERED DRUGS (ALT 637 FOR MEDICARE OP): Performed by: INTERNAL MEDICINE

## 2025-04-23 PROCEDURE — 97530 THERAPEUTIC ACTIVITIES: CPT | Mod: GP

## 2025-04-23 PROCEDURE — 82374 ASSAY BLOOD CARBON DIOXIDE: CPT | Performed by: INTERNAL MEDICINE

## 2025-04-23 PROCEDURE — 1200000002 HC GENERAL ROOM WITH TELEMETRY DAILY

## 2025-04-23 PROCEDURE — 99232 SBSQ HOSP IP/OBS MODERATE 35: CPT

## 2025-04-23 PROCEDURE — 85027 COMPLETE CBC AUTOMATED: CPT | Performed by: INTERNAL MEDICINE

## 2025-04-23 PROCEDURE — 2500000004 HC RX 250 GENERAL PHARMACY W/ HCPCS (ALT 636 FOR OP/ED): Performed by: INTERNAL MEDICINE

## 2025-04-23 PROCEDURE — 97110 THERAPEUTIC EXERCISES: CPT | Mod: GP

## 2025-04-23 PROCEDURE — 36415 COLL VENOUS BLD VENIPUNCTURE: CPT | Performed by: INTERNAL MEDICINE

## 2025-04-23 PROCEDURE — 94640 AIRWAY INHALATION TREATMENT: CPT

## 2025-04-23 PROCEDURE — 2500000004 HC RX 250 GENERAL PHARMACY W/ HCPCS (ALT 636 FOR OP/ED): Mod: JZ | Performed by: INTERNAL MEDICINE

## 2025-04-23 RX ADMIN — FUROSEMIDE 20 MG: 20 TABLET ORAL at 08:25

## 2025-04-23 RX ADMIN — NYSTATIN 1 APPLICATION: 100000 POWDER TOPICAL at 08:25

## 2025-04-23 RX ADMIN — TIOTROPIUM BROMIDE INHALATION SPRAY 2 PUFF: 3.12 SPRAY, METERED RESPIRATORY (INHALATION) at 07:52

## 2025-04-23 RX ADMIN — MEROPENEM 1 G: 1 INJECTION, POWDER, FOR SOLUTION INTRAVENOUS at 11:17

## 2025-04-23 RX ADMIN — FLUTICASONE FUROATE AND VILANTEROL TRIFENATATE 1 PUFF: 200; 25 POWDER RESPIRATORY (INHALATION) at 07:50

## 2025-04-23 RX ADMIN — CARVEDILOL 25 MG: 25 TABLET, FILM COATED ORAL at 16:16

## 2025-04-23 RX ADMIN — OXYCODONE HYDROCHLORIDE 5 MG: 5 TABLET ORAL at 08:24

## 2025-04-23 RX ADMIN — OXYCODONE HYDROCHLORIDE 5 MG: 5 TABLET ORAL at 19:22

## 2025-04-23 RX ADMIN — PIPERACILLIN SODIUM AND TAZOBACTAM SODIUM 3.38 G: 3; .375 INJECTION, SOLUTION INTRAVENOUS at 23:57

## 2025-04-23 RX ADMIN — VANCOMYCIN 1250 MG: 1.75 INJECTION, SOLUTION INTRAVENOUS at 21:07

## 2025-04-23 RX ADMIN — ASPIRIN 81 MG: 81 TABLET, COATED ORAL at 08:25

## 2025-04-23 RX ADMIN — PIPERACILLIN SODIUM AND TAZOBACTAM SODIUM 3.38 G: 3; .375 INJECTION, SOLUTION INTRAVENOUS at 16:16

## 2025-04-23 RX ADMIN — OXYCODONE HYDROCHLORIDE 5 MG: 5 TABLET ORAL at 00:58

## 2025-04-23 RX ADMIN — NYSTATIN 1 APPLICATION: 100000 POWDER TOPICAL at 21:07

## 2025-04-23 RX ADMIN — CARVEDILOL 25 MG: 25 TABLET, FILM COATED ORAL at 08:25

## 2025-04-23 ASSESSMENT — COGNITIVE AND FUNCTIONAL STATUS - GENERAL
HELP NEEDED FOR BATHING: A LITTLE
WALKING IN HOSPITAL ROOM: A LITTLE
PERSONAL GROOMING: A LITTLE
DRESSING REGULAR UPPER BODY CLOTHING: A LOT
DRESSING REGULAR LOWER BODY CLOTHING: A LOT
STANDING UP FROM CHAIR USING ARMS: A LOT
MOVING FROM LYING ON BACK TO SITTING ON SIDE OF FLAT BED WITH BEDRAILS: A LITTLE
TURNING FROM BACK TO SIDE WHILE IN FLAT BAD: A LITTLE
TURNING FROM BACK TO SIDE WHILE IN FLAT BAD: A LITTLE
EATING MEALS: A LITTLE
TURNING FROM BACK TO SIDE WHILE IN FLAT BAD: A LITTLE
EATING MEALS: A LITTLE
DAILY ACTIVITIY SCORE: 14
MOBILITY SCORE: 15
WALKING IN HOSPITAL ROOM: A LITTLE
MOBILITY SCORE: 17
HELP NEEDED FOR BATHING: A LOT
STANDING UP FROM CHAIR USING ARMS: A LOT
CLIMB 3 TO 5 STEPS WITH RAILING: TOTAL
PERSONAL GROOMING: A LITTLE
TOILETING: A LOT
STANDING UP FROM CHAIR USING ARMS: A LITTLE
MOVING FROM LYING ON BACK TO SITTING ON SIDE OF FLAT BED WITH BEDRAILS: A LITTLE
MOVING TO AND FROM BED TO CHAIR: A LITTLE
MOVING TO AND FROM BED TO CHAIR: A LITTLE
CLIMB 3 TO 5 STEPS WITH RAILING: A LITTLE
MOVING FROM LYING ON BACK TO SITTING ON SIDE OF FLAT BED WITH BEDRAILS: A LITTLE
DRESSING REGULAR LOWER BODY CLOTHING: A LITTLE
DAILY ACTIVITIY SCORE: 18
CLIMB 3 TO 5 STEPS WITH RAILING: A LOT
MOBILITY SCORE: 16
TOILETING: A LITTLE
WALKING IN HOSPITAL ROOM: A LOT
DRESSING REGULAR UPPER BODY CLOTHING: A LITTLE
MOVING TO AND FROM BED TO CHAIR: A LITTLE

## 2025-04-23 ASSESSMENT — PAIN SCALES - GENERAL
PAINLEVEL_OUTOF10: 0 - NO PAIN
PAINLEVEL_OUTOF10: 8
PAINLEVEL_OUTOF10: 0 - NO PAIN
PAINLEVEL_OUTOF10: 3
PAINLEVEL_OUTOF10: 2
PAINLEVEL_OUTOF10: 7
PAINLEVEL_OUTOF10: 8

## 2025-04-23 ASSESSMENT — PAIN - FUNCTIONAL ASSESSMENT
PAIN_FUNCTIONAL_ASSESSMENT: 0-10

## 2025-04-23 ASSESSMENT — PAIN DESCRIPTION - ORIENTATION
ORIENTATION: RIGHT
ORIENTATION: RIGHT

## 2025-04-23 ASSESSMENT — PAIN DESCRIPTION - LOCATION
LOCATION: LEG
LOCATION: LEG

## 2025-04-23 ASSESSMENT — PAIN DESCRIPTION - DESCRIPTORS
DESCRIPTORS: ACHING

## 2025-04-23 NOTE — PROGRESS NOTES
Colten Eason is a 64 y.o. male on day 3 of admission presenting with Open wound of right lower extremity, initial encounter.      Subjective   Sitting up in bed.  Denies acute overnight events.  Presently denies pain or shortness of breath.       Objective     Last Recorded Vitals  /79 (BP Location: Left arm, Patient Position: Lying)   Pulse 81   Temp 36.3 °C (97.3 °F) (Oral)   Resp 18   Wt 132 kg (291 lb 0.1 oz)   SpO2 95%   Intake/Output last 3 Shifts:    Intake/Output Summary (Last 24 hours) at 4/23/2025 1206  Last data filed at 4/23/2025 1019  Gross per 24 hour   Intake 1433 ml   Output 2650 ml   Net -1217 ml       Admission Weight  Weight: 132 kg (291 lb 0.1 oz) (04/20/25 1648)    Daily Weight  04/20/25 : 132 kg (291 lb 0.1 oz)    Image Results  CT femur right wo IV contrast  Narrative: STUDY:  CT Extremity; Completed Time:  04/20/2025 at 5:58 PM  INDICATION:  Right knee/femur wound. Assess for hematoma vs. abscess.  COMPARISON:  Correlation with CT right knee imaging of same date, 04/20/25. CTA  lower extremity 04/09/25.  ACCESSION NUMBER(S):  [id number]  ORDERING CLINICIAN:  MAAT MENCHACA  TECHNIQUE:  Thin section axial images were obtained through the right  femur without intravenous contrast.  Orthogonal reconstructed images  were obtained and reviewed.    Automated mA/kV exposure control was utilized and patient examination  was performed in strict accordance with principles of ALARA.  FINDINGS:  There is no acute fracture or malalignment.  There is no  cortical erosion or periosteal reaction.  There is joint effusion of  the knee.    Femoral acetabular joint alignment is maintained with mild to moderate  arthrosis.  Visualized portions of the pelvis are intact.  There is distal soft tissue density suggesting hematoma and/or  abscess.  There are small locules of gas along the medial aspect of  the knee as described on CT.  There is medial induration, edema and  skin thickening.  Cellulitis  extends along from the anterior aspect of  the posterior medial aspect of the thigh.  There is soft tissue  densities in the distal medial soft tissues with dominant density  measuring 10.1 x 6.2 x 2.5 cm (image 43 series 12 B and image 221  axial images).  On prior CT, this measured 12.2 x 6.8 x 3.5 cm.   Muscle configuration and attenuation normal.  Fascial planes are  preserved..  Images through the pelvic viscera are unremarkable.  Impression: Distal inflammatory changes and cellulitis extending into the proximal  medial thigh.  Soft tissue density along the distal medial subcutaneous suggesting  hematoma is decreased compared with prior CT.  Superimposed infection  is not entirely excluded however there is no gas or drainable fluid  component..  No evidence of myositis or acute osteomyelitis.  Signed by Dejon Winklre, DO  CT knee right wo IV contrast  Narrative: INDICATION:  Right knee wound, assess for hematoma versus abscess.  TECHNIQUE:  Thin section axial images were obtained through the right  knee without intravenous contrast.  Orthogonal reconstructed images  were obtained and reviewed.    Automated mA/kV exposure control was utilized and patient examination  was performed in strict accordance with principles of ALARA.  RADIATION AMOUNT:  459 mGy-cm.  COMPARISON:  MR Right knee 04/13/2025; CT Knee 04/09/2025.  FINDINGS:    There is no acute fracture or malalignment.  Tibial plateau is intact.   There is no osteochondral defect.  There is no cortical erosion or  periosteal reaction.  Patellofemoral joint alignment is maintained.   There is small joint effusion.  There is no intra-articular gas.  There is significant induration, edema and inflammatory changes with  anterior and medial soft tissue density suggesting abscess extending  from the level of the distal femur through the proximal diaphysis of  the tibia measuring 7.9 x 2.0 x 10.4 cm with increased attenuation  suggesting hematoma.  This  appears increased in size compared with  prior MRI, however on CT performed 4/9/2025, this is seen measuring  15.9 x 4.2 x 12.0 cm.  There are locules of gas along the medial soft  tissues (image 9 through 33 axial images).  There is edema in the  anterior compartment of the calf suggesting myositis.  Subcutaneous  plantar changes and skin thickening extend in the anterior aspect of  the lower extremity.  Impression: Anterior large soft tissue density concerning for hematoma which  appears increased compared prior MRI however is decreased in size  compared with prior CT.  Overall, the appearance suggest hematoma.   Superimposed infection is not entirely excluded however there is no  locules of gas or focal drainable fluid component.  Locules of gas along the medial aspect of the knee suggesting soft  tissue infection.  Myositis in the anterior compartment of the calf.  Cellulitis is  described.  No acute fracture or malalignment.  Small joint effusion.  No evidence of intra-articular gas or septic  arthritis.  Signed by Dejon Winkler,       Physical Exam  Vitals and nursing note reviewed.   Constitutional:       Appearance: Normal appearance. He is obese.      Comments: Well-appearing   HENT:      Head: Normocephalic and atraumatic.      Nose: Nose normal.      Mouth/Throat:      Mouth: Mucous membranes are moist.   Eyes:      Extraocular Movements: Extraocular movements intact.      Conjunctiva/sclera: Conjunctivae normal.   Cardiovascular:      Rate and Rhythm: Normal rate.      Pulses: Normal pulses.      Heart sounds: Normal heart sounds.   Pulmonary:      Effort: Pulmonary effort is normal.      Breath sounds: Normal breath sounds.   Abdominal:      General: Bowel sounds are normal.      Palpations: Abdomen is soft.   Genitourinary:     Comments: Deferred  Musculoskeletal:         General: Normal range of motion.      Cervical back: Normal range of motion and neck supple.      Comments: Range of motion  RLE mildly limited due to discomfort from wound   Skin:     General: Skin is warm and dry.      Capillary Refill: Capillary refill takes less than 2 seconds.      Comments: RLE dressing clean dry intact   Neurological:      Mental Status: He is alert and oriented to person, place, and time.   Psychiatric:         Mood and Affect: Mood normal.         Behavior: Behavior normal.         Relevant Results                  Assessment & Plan    Right lower extremity cellulitis  History of R-thigh hematoma s/p evacuation 4/10/2025  Continue local wound care  Negative wound cultures, blood cultures negative to date  Continue ABX per ID  Appreciate reccs from ID, vascular  PT OT  Fall precautions             Kimber Agrawal, APRN-CNP

## 2025-04-23 NOTE — CARE PLAN
The patient's goals for the shift include maintain safety, wound care    The clinical goals for the shift include pain mangment      Problem: Pain - Adult  Goal: Verbalizes/displays adequate comfort level or baseline comfort level  Outcome: Progressing     Problem: Safety - Adult  Goal: Free from fall injury  Outcome: Progressing     Problem: Discharge Planning  Goal: Discharge to home or other facility with appropriate resources  Outcome: Progressing     Problem: Chronic Conditions and Co-morbidities  Goal: Patient's chronic conditions and co-morbidity symptoms are monitored and maintained or improved  Outcome: Progressing     Problem: Nutrition  Goal: Nutrient intake appropriate for maintaining nutritional needs  Outcome: Progressing     Problem: Skin  Goal: Decreased wound size/increased tissue granulation at next dressing change  Outcome: Progressing  Flowsheets (Taken 4/23/2025 0953)  Decreased wound size/increased tissue granulation at next dressing change: Promote sleep for wound healing  Goal: Participates in plan/prevention/treatment measures  Outcome: Progressing  Flowsheets (Taken 4/23/2025 0953)  Participates in plan/prevention/treatment measures: Discuss with provider PT/OT consult  Goal: Prevent/manage excess moisture  Outcome: Progressing  Flowsheets (Taken 4/23/2025 0953)  Prevent/manage excess moisture: Monitor for/manage infection if present  Goal: Prevent/minimize sheer/friction injuries  Outcome: Progressing  Flowsheets (Taken 4/23/2025 0953)  Prevent/minimize sheer/friction injuries: Use pull sheet  Goal: Promote/optimize nutrition  Outcome: Progressing  Flowsheets (Taken 4/23/2025 0953)  Promote/optimize nutrition: Monitor/record intake including meals  Goal: Promote skin healing  Outcome: Progressing  Flowsheets (Taken 4/23/2025 0953)  Promote skin healing: Turn/reposition every 2 hours/use positioning/transfer devices     Problem: Respiratory  Goal: No signs of respiratory distress (eg. Use  of accessory muscles. Peds grunting)  Outcome: Progressing

## 2025-04-23 NOTE — PROGRESS NOTES
04/23/25 0830   Discharge Planning   Expected Discharge Disposition Home H  ([facility])     Patient declining SNF at this time   Patient will need C9 completed once IV ATB recommendations are finalized   Patient will have healthy at home to follow for homecare orders     Will need internal referral for home health upon discharge  Will need to secure SOC prior to discharge   ** do not discharge without speaking to care coordination**

## 2025-04-23 NOTE — PROGRESS NOTES
Physical Therapy    Physical Therapy Treatment    Patient Name: Colten Eason  MRN: [id number]  Department: [address]  Room: [facility]429-  Today's Date: 4/23/2025  Time Calculation  Start Time: 0850  Stop Time: 0920  Time Calculation (min): 30 min         Assessment/Plan   PT Assessment  Rehab Prognosis: Good  Barriers to Discharge Home: Physical needs  Physical Needs: Stair navigation into home limited by function/safety, Ambulating household distances limited by function/safety, Intermittent mobility assistance needed, Intermittent ADL assistance needed  Evaluation/Treatment Tolerance: Patient tolerated treatment well  Medical Staff Made Aware: Yes  Strengths: Premorbid level of function  Barriers to Participation: Comorbidities  End of Session Communication: Bedside nurse  Assessment Comment: Much improved functional mobility tolerance today; Still required min assist for right LE out of bed and transfers for ease/safety. Pt mentioned  may attempt to be d/c'ed to sister's home where there is no steps and can have adjustable bed. Will continue to loom into that option and asses pt's mobility improvements for appropriate d/c plan.  End of Session Patient Position: Bed, 3 rail up, Alarm on (call button in reach)     PT Plan  Treatment/Interventions: Bed mobility, Transfer training, Gait training, Stair training, Balance training, Neuromuscular re-education, Strengthening, Endurance training, Range of motion, Therapeutic activity, Therapeutic exercise, Home exercise program  PT Plan: Ongoing PT  PT Frequency: 4 times per week  PT Discharge Recommendations: Moderate intensity level of continued care  Equipment Recommended upon Discharge: Wheeled walker  PT Recommended Transfer Status: Assist x1, Assistive device (FWW)  PT - OK to Discharge: Yes      General Visit Information:   PT  Visit  PT Received On: 04/23/25  General  Family/Caregiver Present: No  Prior to Session Communication: Bedside nurse  Patient Position  Received: Bed, 3 rail up, Alarm off, not on at start of session  Preferred Learning Style: verbal, visual  General Comment: cleared by nurse for therapy; pt agreeable to therapy. + telemetry    Subjective   Precautions:  Precautions  Hearing/Visual Limitations: WFL  LE Weight Bearing Status: Weight Bearing as Tolerated (right LE)  Medical Precautions: Fall precautions  Precautions Comment: dressing intact to RLE wound      Vital Signs Comment: O2 sat 94% with HR 72 bpm sitting edge of bed---room air     Objective   Pain:  Pain Assessment  Pain Assessment: 0-10  0-10 (Numeric) Pain Score: 3  Pain Type: Acute pain  Pain Location: Leg  Pain Orientation: Right  Pain Interventions: Repositioned  Response to Interventions: No change in pain  Cognition:  Cognition  Overall Cognitive Status: Within Functional Limits  Orientation Level: Oriented X4  Safety/Judgement:  (intermittant decreased safety insight during functional mobility)  Insight: Mild  Impulsive: Mildly  Coordination:  Movements are Fluid and Coordinated: No  Lower Body Coordination: decreased accuracy/timing maame LE's  Postural Control:  Postural Control  Posture Comment: forward head; rounded shoulders  Extremity/Trunk Assessments:    Activity Tolerance:  Activity Tolerance  Endurance: Decreased tolerance for upright activites  Activity Tolerance Comments: good -; much improved today  Treatments:  Therapeutic Exercise  Therapeutic Exercise Performed: Yes  Therapeutic Exercise Activity 1: supine maame AP x 30 reps  Therapeutic Exercise Activity 2: supine maame QS, GS x 10 reps each  Therapeutic Exercise Activity 3: supine assisted maame hip abd/add x 10 reps  Therapeutic Exercise Activity 4: seated maame LAQ's x 20  reps  Therapeutic Exercise Activity 5: seated maame marching x 15 reps    Therapeutic Activity  Therapeutic Activity Performed: Yes (see bed mobility, transfers, amb with FWW)    Bed Mobility  Bed Mobility: Yes  Bed Mobility 1  Bed Mobility 1: Supine to  sitting  Level of Assistance 1: Minimum assistance  Bed Mobility Comments 1: head of bed elevated; min assist of 1 for right LE off of bed  Bed Mobility 2  Bed Mobility  2: Sitting to supine  Level of Assistance 2: Close supervision  Bed Mobility Comments 2: head of bed elevated; moderately effortful for lifting right LE onto bed    Ambulation/Gait Training  Ambulation/Gait Training Performed: Yes  Ambulation/Gait Training 1  Surface 1: Level tile  Device 1: Rolling walker  Assistance 1: Contact guard  Quality of Gait 1: Diminished heel strike  Comments/Distance (ft) 1: 75 ft x 2, 50 ft x 2, good balance and sequencing  Transfers  Transfer: Yes  Transfer 1  Transfer From 1: Bed to  Transfer to 1: Stand  Technique 1: Sit to stand  Transfer Device 1: Walker  Transfer Level of Assistance 1: Minimum assistance, Minimal verbal cues  Trials/Comments 1: verbal cues for proper maame hand placement on bed  Transfers 2  Transfer From 2: Stand to  Transfer to 2: Bed  Technique 2: Stand to sit  Transfer Device 2: Walker  Transfer Level of Assistance 2: Contact guard, Minimal verbal cues  Trials/Comments 2: verbal cues for reaching back with both hands for bed    Stairs  Stairs: No    Other Activity  Other Activity Performed: Yes (PT donned personal shoes with pt sitting edge of bed)    Outcome Measures:  [facility] Basic Mobility  Turning from your back to your side while in a flat bed without using bedrails: A little  Moving from lying on your back to sitting on the side of a flat bed without using bedrails: A little  Moving to and from bed to chair (including a wheelchair): A little  Standing up from a chair using your arms (e.g. wheelchair or bedside chair): A little  To walk in hospital room: A little  Climbing 3-5 steps with railing: Total  Basic Mobility - Total Score: 16    Education Documentation  Body Mechanics, taught by Neena Benitez, PT at 4/23/2025 11:13 AM.  Learner: Patient  Readiness: Acceptance  Method:  Explanation  Response: Needs Reinforcement  Comment: PT educated pt in safe transfer technique    Mobility Training, taught by Neena Benitez PT at 4/23/2025 11:13 AM.  Learner: Patient  Readiness: Acceptance  Method: Explanation  Response: Needs Reinforcement  Comment: PT educated pt in safe transfer technique    Education Comments  No comments found.        OP EDUCATION:       Encounter Problems       Encounter Problems (Active)       PT Problem       Strength (Progressing)       Start:  04/21/25    Expected End:  05/21/25       The patient will demonstrate an overall strength of 4/5 in BLE to assist with completion of functional mobility.           Functional Mobility (Progressing)       Start:  04/21/25    Expected End:  05/21/25       The patient will complete functional mobility (bed mobility, transfers, etc.) at a mod indep level with LRAD by DC.           Ambulation (Progressing)       Start:  04/21/25    Expected End:  05/21/25       The patient will be able to ambulate at a mod indep level for >50ftx1 with RW.          Steps (Progressing)       Start:  04/21/25    Expected End:  05/21/25       The patient will be able to ascend/descend 4 steps with use of 1 rail at a mod indep level by DC.             Pain - Adult

## 2025-04-23 NOTE — PROGRESS NOTES
Colten Eason is a 64 y.o. male on day 3 of admission presenting with Open wound of right lower extremity, initial encounter.    Subjective   Interval History:    Afebrile, no chills  States pain is better  Denies shortness of breath or chest pain  Denies nausea, vomiting, diarrhea, abdominal pain      Review of Systems   All other systems reviewed and are negative.      Objective   Range of Vitals (last 24 hours)  Heart Rate:  [79-90]   Temp:  [36.3 °C (97.3 °F)-36.5 °C (97.7 °F)]   Resp:  [17-18]   BP: (137-159)/(79-89)   SpO2:  [95 %]   Daily Weight  04/20/25 : 132 kg (291 lb 0.1 oz)    Body mass index is 44.25 kg/m².    Physical Exam  Constitutional:       Appearance: Normal appearance.   HENT:      Head: Normocephalic and atraumatic.      Nose: Nose normal.   Eyes:      General: No scleral icterus.     Extraocular Movements: Extraocular movements intact.      Conjunctiva/sclera: Conjunctivae normal.   Cardiovascular:      Rate and Rhythm: Normal rate and regular rhythm.      Heart sounds: Normal heart sounds.   Pulmonary:      Effort: Pulmonary effort is normal.      Breath sounds: Normal breath sounds.   Abdominal:      General: Bowel sounds are normal.      Palpations: Abdomen is soft.      Tenderness: There is no abdominal tenderness.   Musculoskeletal:      Cervical back: Normal range of motion and neck supple.      Right lower leg: Edema present.      Left lower leg: Edema present.   Skin:     General: Skin is warm and dry.      Comments: Right thigh/knee dressing   Neurological:      Mental Status: He is alert and oriented to person, place, and time.   Psychiatric:         Behavior: Behavior normal.        Antibiotics  piperacillin-tazobactam - 3.375 gram/50 mL  vancomycin - 1.25 gram/250 mL    Relevant Results  Labs  Results from last 72 hours   Lab Units 04/23/25  0615 04/22/25  0638 04/21/25  0552 04/20/25  1721   WBC AUTO x10*3/uL 8.3 7.6 8.5 9.1   HEMOGLOBIN g/dL 10.0* 9.4* 9.7* 9.9*   HEMATOCRIT %  31.2* 29.2* 30.6* 30.5*   PLATELETS AUTO x10*3/uL 295 326 314 332   NEUTROS PCT AUTO %  --   --   --  64.1   LYMPHS PCT AUTO %  --   --   --  16.0   MONOS PCT AUTO %  --   --   --  11.1   EOS PCT AUTO %  --   --   --  3.1     Results from last 72 hours   Lab Units 04/23/25  0615 04/22/25  0638 04/21/25  0552   SODIUM mmol/L 139 141 141   POTASSIUM mmol/L 4.3 4.2 4.1   CHLORIDE mmol/L 111* 113* 111*   CO2 mmol/L 22 21 21   BUN mg/dL 55* 50* 50*   CREATININE mg/dL 2.05* 1.80* 1.88*   GLUCOSE mg/dL 106* 100* 95   CALCIUM mg/dL 8.7 8.6 8.2*   ANION GAP mmol/L 10 11 13   EGFR mL/min/1.73m*2 36* 42* 40*     Results from last 72 hours   Lab Units 04/20/25  1721   ALK PHOS U/L 64   BILIRUBIN TOTAL mg/dL 0.4   PROTEIN TOTAL g/dL 6.2*   ALT U/L 16   AST U/L 15   ALBUMIN g/dL 3.1*     Estimated Creatinine Clearance: 48.9 mL/min (A) (by C-G formula based on SCr of 2.05 mg/dL (H)).  C-Reactive Protein   Date Value Ref Range Status   04/20/2025 11.16 (H) <1.00 mg/dL Final     Microbiology  Reviewed-blood cultures pending, wound culture negative  Imaging  CT femur right wo IV contrast  Result Date: 4/20/2025  STUDY: CT Extremity; Completed Time:  04/20/2025 at 5:58 PM INDICATION: Right knee/femur wound. Assess for hematoma vs. abscess. COMPARISON: Correlation with CT right knee imaging of same date, 04/20/25. CTA lower extremity 04/09/25. ACCESSION NUMBER(S): [id number] ORDERING CLINICIAN: MATA MENCHACA TECHNIQUE:  Thin section axial images were obtained through the right femur without intravenous contrast.  Orthogonal reconstructed images were obtained and reviewed.  Automated mA/kV exposure control was utilized and patient examination was performed in strict accordance with principles of ALARA. FINDINGS:  There is no acute fracture or malalignment.  There is no cortical erosion or periosteal reaction.  There is joint effusion of the knee.  Femoral acetabular joint alignment is maintained with mild to moderate arthrosis.   Visualized portions of the pelvis are intact. There is distal soft tissue density suggesting hematoma and/or abscess.  There are small locules of gas along the medial aspect of the knee as described on CT.  There is medial induration, edema and skin thickening.  Cellulitis extends along from the anterior aspect of the posterior medial aspect of the thigh.  There is soft tissue densities in the distal medial soft tissues with dominant density measuring 10.1 x 6.2 x 2.5 cm (image 43 series 12 B and image 221 axial images).  On prior CT, this measured 12.2 x 6.8 x 3.5 cm. Muscle configuration and attenuation normal.  Fascial planes are preserved.. Images through the pelvic viscera are unremarkable.    Distal inflammatory changes and cellulitis extending into the proximal medial thigh. Soft tissue density along the distal medial subcutaneous suggesting hematoma is decreased compared with prior CT.  Superimposed infection is not entirely excluded however there is no gas or drainable fluid component.. No evidence of myositis or acute osteomyelitis. Signed by Dejon Winkler, DO    CT knee right wo IV contrast  Result Date: 4/20/2025  INDICATION:  Right knee wound, assess for hematoma versus abscess. TECHNIQUE:  Thin section axial images were obtained through the right knee without intravenous contrast.  Orthogonal reconstructed images were obtained and reviewed.  Automated mA/kV exposure control was utilized and patient examination was performed in strict accordance with principles of ALARA. RADIATION AMOUNT:  459 mGy-cm. COMPARISON:  MR Right knee 04/13/2025; CT Knee 04/09/2025. FINDINGS:  There is no acute fracture or malalignment.  Tibial plateau is intact.  There is no osteochondral defect.  There is no cortical erosion or periosteal reaction.  Patellofemoral joint alignment is maintained. There is small joint effusion.  There is no intra-articular gas. There is significant induration, edema and inflammatory changes  with anterior and medial soft tissue density suggesting abscess extending from the level of the distal femur through the proximal diaphysis of the tibia measuring 7.9 x 2.0 x 10.4 cm with increased attenuation suggesting hematoma.  This appears increased in size compared with prior MRI, however on CT performed 4/9/2025, this is seen measuring 15.9 x 4.2 x 12.0 cm.  There are locules of gas along the medial soft tissues (image 9 through 33 axial images).  There is edema in the anterior compartment of the calf suggesting myositis.  Subcutaneous plantar changes and skin thickening extend in the anterior aspect of the lower extremity.    Anterior large soft tissue density concerning for hematoma which appears increased compared prior MRI however is decreased in size compared with prior CT.  Overall, the appearance suggest hematoma. Superimposed infection is not entirely excluded however there is no locules of gas or focal drainable fluid component. Locules of gas along the medial aspect of the knee suggesting soft tissue infection. Myositis in the anterior compartment of the calf.  Cellulitis is described. No acute fracture or malalignment. Small joint effusion.  No evidence of intra-articular gas or septic arthritis. Signed by Dejon Winkler,     MR knee right wo IV contrast  Result Date: 4/13/2025  Interpreted By:  Ignacio Agosto, STUDY: MRI of the right knee without contrast dated 4/13/2025.   INDICATION: Signs/Symptoms:R quadricep tendon injury   COMPARISON: None.   ACCESSION NUMBER(S): [id number]   ORDERING CLINICIAN: HORTENCIA LEBRON   TECHNIQUE: Multiplanar multisequence MRI of the right knee was performed without intravenous contrast.   FINDINGS: MUSCLES, TENDONS, AND LIGAMENTS:   The anterior cruciate ligament is intact.  The posterior cruciate ligament is intact. The medial collateral ligament is intact. The lateral collateral ligament complex is intact. The popliteus and biceps femoris tendons,  iliotibial band, and extensor mechanism are intact.   MENISCI:   The medial meniscus is intact.  The lateral meniscus is intact.   OSSEOUS STRUCTURES AND JOINTS:   No fracture or dislocation is evident. There is marginal femorotibial and patellofemoral osteophyte formation. As seen on this exam of the hyaline articular cartilage in the femorotibial joint spaces is intact. There is full-thickness fissuring/loss of hyaline articular cartilage of the patellar apex with some foci of mild-to-moderate partial-thickness fissuring of the hyaline articular cartilage of the medial and lateral facets of the patella. There is a moderate volume knee joint effusion.   SOFT TISSUES:   No significant volume of fluid is evident in a popliteal cyst. Extensive reticular increased T2 signal intensity is seen in the subcutaneous tissues. In the subcutaneous tissues of the anterior to anteromedial proximal leg there is a fusiform heterogeneous collection measuring up to approximately 1.5 x 9.3 x 5.8 cm. It is predominantly hypointense on T2 weighted imaging with regions of some higher signal intensity, and heterogeneous with regions of hyperintensity and hypointensity on T1 weighted imaging.       1. Findings most compatible with evolving hematoma in the subcutaneous tissues of the anterior to anteromedial proximal leg. 2. Diffuse reticular increased T2 signal intensity in the subcutaneous tissues which may represent lymphedema and/or cellulitis. 3. As seen on this examination of the quadriceps tendon appears to be intact. If there is concern for compromise of the quadriceps more proximally in the thigh, thigh MRI is recommended. 4. Moderate volume knee joint effusion. 5. Mild-to-moderate degenerative change of the knee.   Signed by: Ignacio Agosto 4/13/2025 1:53 PM Dictation workstation:   [id number]    CT angio lower extremity right w and or wo IV contrast  Result Date: 4/9/2025  STUDY: CT CTA LOWER EXTREMITY RIGHT; 04/09/2025 06:01  PM INDICATION:  Right lower extremity hematoma, evaluate for vessel dissection. COMPARISON: CT Knee 04/09/2025; XR Femur 04/09/2025. ACCESSION NUMBER(S): [id number] ORDERING CLINICIAN: TECHNIQUE:  Helical CT is performed from the infrarenal aorta through the feet after bolus administration of 120 mL of Omnipaque 350. Images are reviewed and processed at a workstation according to the CT angiogram protocol with 3-D and/or MIP post processing imaging generated.  2236 DICOM images received. Automated mA/kV exposure control was utilized and patient examination was performed in strict accordance with principles of ALARA. FINDINGS: No stenoses are seen within the visualized portions of the right superficial femoral artery, or within the right popliteal artery.  No intimal flap is visualized to indicate a dissection.  There are small contrast blush is seen in the hematomas in the medial aspect of the distal right thigh, and anterior medial aspect of the right knee.  It is indeterminate whether or not there are branches from the profunda femoral artery or the geniculate branches from the popliteal artery are contributing to these hematomas.    1.  Contrast blushing is in the two hematomas located in the medial aspect of the distal right thigh and anteromedial aspect of the right knee, suggestive of an arterial bleed.  It is indeterminate whether or not these are contributed by branches of the profunda femoral artery, or the geniculate arteries. 2.  No stenoses or dissections within the visualized portions of the right superficial femoral artery, or right popliteal artery. This report was called to Dr. Ricardo Cook at 7:40 PM on April 9, 2025. Signed by Nik Hart MD    XR hand right 3+ views  Result Date: 4/9/2025  Interpreted By:  Ludwig Briggs, STUDY: XR HAND RIGHT 3+ VIEWS   INDICATION: Signs/Symptoms:right hand pain.   COMPARISON: None   ACCESSION NUMBER(S): [id number]   ORDERING CLINICIAN: QIAN DELAROSA   FINDINGS:  Mild osteoarthritis right hand more advanced at the 1st [facility]. No evidence of fracture or malalignment       Osteoarthritis right hand most prominent at the 1st [facility].   Signed by: Ludwig Briggs 4/9/2025 5:28 PM Dictation workstation:   [id number]    CT knee right wo IV contrast  Result Date: 4/9/2025  STUDY: CT Extremity; Completed Time:  4/9/2025 3:12 PM INDICATION: Evaluate for soft tissue hematoma. COMPARISON: XR RT femur 4/9/2025. ACCESSION NUMBER(S): [id number] ORDERING CLINICIAN: QIAN DELAROSA TECHNIQUE:  Thin section axial images were obtained through the right knee without intravenous contrast.  Orthogonal reconstructed images were obtained and reviewed.  Automated mA/kV exposure control was utilized and patient examination was performed in strict accordance with principles of ALARA. FINDINGS: No acute fractures or dislocations are visualized within the right knee.  There is no evidence of patellar subluxation.  No suprapatellar effusion is appreciated.  There is prominent soft tissue swelling over the anterior aspect of the knee.  There is a hyperdense fluid collection, indicative of a hematoma.  It is estimated at 14.5 x 9.9 x 5.2 cm.  There is impingement noted upon the vastus medialis of the quadriceps muscle.  However, this does not appear to involve the musculature.  There is a second hematoma over the medial aspect the distal thigh, measured at 11.7 x 8.6 x 2.7 cm.  There is generalized subcutaneous edema over the knee.    1.  Large hematoma over the anteromedial aspect of the right knee. 2.  Additional hematoma over the medial aspect of the distal right thigh. 3.  No acute osseous findings involving the right knee. 4.  No prominent hematoma appreciated within the surrounding musculature.  5.  Subcutaneous edema overlying the right knee. Signed by Nik Hart MD    XR femur right 2+ views  Result Date: 4/9/2025  STUDY: Femur Radiographs; 4/9/2025 12:50 PM INDICATION: Right thigh pain. COMPARISON:  None Available. ACCESSION NUMBER(S): [id number] ORDERING CLINICIAN: QIAN DELAROSA TECHNIQUE:  Two view(s) of the right femur (five images). FINDINGS:  There is no displaced fracture.  The alignment is anatomic.  Narrowing of the right hip joint space.  Prominent soft tissue swelling of the distal thigh and knee.    No acute bone or joint abnormality. Soft tissue swelling. DJD. Signed by Deniz Benson MD    XR knee right 4+ views  Result Date: 4/9/2025  Interpreted By:  Ca Viera, STUDY: XR KNEE RIGHT 4+ VIEWS;  4/9/2025 12:45 pm   INDICATION: Signs/Symptoms:right knee pain.     COMPARISON: None.   ACCESSION NUMBER(S): [id number]   ORDERING CLINICIAN: QIAN DELAROSA   FINDINGS: Five views of the right knee obtained.   No acute fracture or osseous displacement. Mild medial compartment narrowing. Minimal lateral and patellofemoral compartment spurring. Prominent anterior/prepatellar soft tissue swelling with possible subtle nodularity. Faint linear presumed vascular calcifications in the posterior soft tissues       No definite acute osseous finding. Mild degenerative changes. Prominent anterior soft tissue swelling with possible nodularity.   MACRO: None.   Signed by: Ca Viera 4/9/2025 12:56 PM Dictation workstation:   [id number]    CT head wo IV contrast  Result Date: 4/9/2025  Interpreted By:  Adal Landeros, STUDY: CT HEAD WO IV CONTRAST;  4/9/2025 12:42 pm   INDICATION: Signs/Symptoms:fall, hit head.   COMPARISON: None.   ACCESSION NUMBER(S): [id number]   ORDERING CLINICIAN: QIAN DELAROSA   TECHNIQUE: Noncontrast axial CT scan of head was performed. Angled reformats in brain and bone windows were generated. The images were reviewed in bone, brain, blood and soft tissue windows.   FINDINGS: CSF Spaces: The ventricles, sulci and basal cisterns are within normal limits. There is no extraaxial fluid collection.   Parenchyma: Moderate parenchymal atrophy. Periventricular and subcortical white matter  hypoattenuation is nonspecific, however likely reflects chronic microvascular ischemic versus chronic hypertensive changes. The grey-white differentiation is intact. There is no mass effect or midline shift.  There is no intracranial hemorrhage. Minimal density along the periphery of the bifrontal regions (series 4, images 20 9-38) is favored to be secondary to a mixture of motion artifact and localized beam hardening artifact   Calvarium: No acute displaced calvarial fracture. Infiltration of the right frontal scalp suggestive of localized soft tissue injury.   Paranasal sinuses and mastoids: Mastoid air cells appear clear. 2.4 x 2.0 x 2.8 cm rounded cystic lesion demonstrating peripheral calcification in the region peripheral to the fossa of Rosenmuller. Presumed cerumen within the bilateral external auditory canals. Mild mucosal thickening of the bilateral ethmoid and right frontal sinus.       No evidence of acute cortical infarct or intracranial hemorrhage.   2.4 x 2.0 x 2.8 cm rounded cystic lesion containing peripheral calcification in the region peripheral to the fossa of Rosenmuller. Follow-up nonemergent MRI soft tissue neck protocol advised for further assessment.   MACRO: None     Signed by: Adal Landeros 4/9/2025 12:53 PM Dictation workstation:   [id number]      Assessment/Plan   Infected right thigh/knee hematoma  Right thigh hematoma s/p evacuation  Chronic kidney disease stage III     Continue vancomycin  Continue Zosyn  Wound culture finalized no growth  Follow-up blood cultures-pending no growth  Local care  Monitor renal function  Supportive care  Monitor temperature and WBC  Discharge plan is PO doxycycline 100 mg BID and augmentin 875-125 mg PO BID for a total of 14 days through 5/5/25  OK to discharge from ID standpoint, discussed with primary team    Discussed with Dr Higgins    Total time spent caring for the patient today was 30 minutes.  This includes time spent before the visit  reviewing the chart, time spent during the visit, and time spent after the visit on documentation.        Laura Monterroso, APRN-CNP

## 2025-04-24 LAB
ANION GAP SERPL CALCULATED.3IONS-SCNC: 12 MMOL/L (ref 10–20)
BACTERIA BLD CULT: NORMAL
BACTERIA BLD CULT: NORMAL
BASOPHILS # BLD AUTO: 0.1 X10*3/UL (ref 0–0.1)
BASOPHILS NFR BLD AUTO: 1.2 %
BUN SERPL-MCNC: 58 MG/DL (ref 6–23)
CALCIUM SERPL-MCNC: 8.5 MG/DL (ref 8.6–10.3)
CHLORIDE SERPL-SCNC: 110 MMOL/L (ref 98–107)
CO2 SERPL-SCNC: 22 MMOL/L (ref 21–32)
CREAT SERPL-MCNC: 2 MG/DL (ref 0.5–1.3)
CREAT SERPL-MCNC: 2.01 MG/DL (ref 0.5–1.3)
EGFRCR SERPLBLD CKD-EPI 2021: 37 ML/MIN/1.73M*2
EGFRCR SERPLBLD CKD-EPI 2021: 37 ML/MIN/1.73M*2
EOSINOPHIL # BLD AUTO: 0.3 X10*3/UL (ref 0–0.7)
EOSINOPHIL NFR BLD AUTO: 3.5 %
ERYTHROCYTE [DISTWIDTH] IN BLOOD BY AUTOMATED COUNT: 14.5 % (ref 11.5–14.5)
GLUCOSE SERPL-MCNC: 104 MG/DL (ref 74–99)
HCT VFR BLD AUTO: 30.6 % (ref 41–52)
HGB BLD-MCNC: 9.9 G/DL (ref 13.5–17.5)
HOLD SPECIMEN: NORMAL
IMM GRANULOCYTES # BLD AUTO: 0.3 X10*3/UL (ref 0–0.7)
IMM GRANULOCYTES NFR BLD AUTO: 3.5 % (ref 0–0.9)
LYMPHOCYTES # BLD AUTO: 1.68 X10*3/UL (ref 1.2–4.8)
LYMPHOCYTES NFR BLD AUTO: 19.6 %
MCH RBC QN AUTO: 29.7 PG (ref 26–34)
MCHC RBC AUTO-ENTMCNC: 32.4 G/DL (ref 32–36)
MCV RBC AUTO: 92 FL (ref 80–100)
MONOCYTES # BLD AUTO: 0.71 X10*3/UL (ref 0.1–1)
MONOCYTES NFR BLD AUTO: 8.3 %
NEUTROPHILS # BLD AUTO: 5.48 X10*3/UL (ref 1.2–7.7)
NEUTROPHILS NFR BLD AUTO: 63.9 %
NRBC BLD-RTO: 0 /100 WBCS (ref 0–0)
PLATELET # BLD AUTO: 325 X10*3/UL (ref 150–450)
POTASSIUM SERPL-SCNC: 4.1 MMOL/L (ref 3.5–5.3)
RBC # BLD AUTO: 3.33 X10*6/UL (ref 4.5–5.9)
SODIUM SERPL-SCNC: 140 MMOL/L (ref 136–145)
VANCOMYCIN SERPL-MCNC: 22.4 UG/ML (ref 5–20)
WBC # BLD AUTO: 8.6 X10*3/UL (ref 4.4–11.3)

## 2025-04-24 PROCEDURE — 1210000001 HC SEMI-PRIVATE ROOM DAILY

## 2025-04-24 PROCEDURE — 36415 COLL VENOUS BLD VENIPUNCTURE: CPT | Performed by: INTERNAL MEDICINE

## 2025-04-24 PROCEDURE — 97116 GAIT TRAINING THERAPY: CPT | Mod: GP

## 2025-04-24 PROCEDURE — 80048 BASIC METABOLIC PNL TOTAL CA: CPT | Performed by: INTERNAL MEDICINE

## 2025-04-24 PROCEDURE — 2500000001 HC RX 250 WO HCPCS SELF ADMINISTERED DRUGS (ALT 637 FOR MEDICARE OP): Performed by: INTERNAL MEDICINE

## 2025-04-24 PROCEDURE — 80202 ASSAY OF VANCOMYCIN: CPT | Performed by: INTERNAL MEDICINE

## 2025-04-24 PROCEDURE — 85025 COMPLETE CBC W/AUTO DIFF WBC: CPT

## 2025-04-24 PROCEDURE — 94640 AIRWAY INHALATION TREATMENT: CPT

## 2025-04-24 PROCEDURE — 82565 ASSAY OF CREATININE: CPT

## 2025-04-24 PROCEDURE — 97530 THERAPEUTIC ACTIVITIES: CPT | Mod: GP

## 2025-04-24 PROCEDURE — 9420000001 HC RT PATIENT EDUCATION 5 MIN

## 2025-04-24 PROCEDURE — RXMED WILLOW AMBULATORY MEDICATION CHARGE

## 2025-04-24 PROCEDURE — 2500000004 HC RX 250 GENERAL PHARMACY W/ HCPCS (ALT 636 FOR OP/ED): Mod: JZ | Performed by: INTERNAL MEDICINE

## 2025-04-24 PROCEDURE — 99239 HOSP IP/OBS DSCHRG MGMT >30: CPT

## 2025-04-24 RX ORDER — AMOXICILLIN AND CLAVULANATE POTASSIUM 875; 125 MG/1; MG/1
1 TABLET, FILM COATED ORAL 2 TIMES DAILY
Qty: 28 TABLET | Refills: 0 | Status: SHIPPED | OUTPATIENT
Start: 2025-04-24 | End: 2025-05-09

## 2025-04-24 RX ORDER — NYSTATIN 100000 [USP'U]/G
1 POWDER TOPICAL 2 TIMES DAILY
Qty: 30 G | Refills: 0 | Status: SHIPPED | OUTPATIENT
Start: 2025-04-24 | End: 2025-05-24

## 2025-04-24 RX ORDER — DOXYCYCLINE 100 MG/1
100 CAPSULE ORAL 2 TIMES DAILY
Qty: 28 CAPSULE | Refills: 0 | Status: SHIPPED | OUTPATIENT
Start: 2025-04-24 | End: 2025-05-09

## 2025-04-24 RX ORDER — AMOXICILLIN AND CLAVULANATE POTASSIUM 875; 125 MG/1; MG/1
1 TABLET, FILM COATED ORAL EVERY 12 HOURS SCHEDULED
Status: DISCONTINUED | OUTPATIENT
Start: 2025-04-24 | End: 2025-04-25 | Stop reason: HOSPADM

## 2025-04-24 RX ORDER — DOXYCYCLINE 100 MG/1
100 CAPSULE ORAL EVERY 12 HOURS SCHEDULED
Status: DISCONTINUED | OUTPATIENT
Start: 2025-04-24 | End: 2025-04-25 | Stop reason: HOSPADM

## 2025-04-24 RX ORDER — OXYCODONE HYDROCHLORIDE 5 MG/1
5 TABLET ORAL EVERY 8 HOURS PRN
Qty: 15 TABLET | Refills: 0 | Status: SHIPPED | OUTPATIENT
Start: 2025-04-24 | End: 2025-04-30

## 2025-04-24 RX ADMIN — ASPIRIN 81 MG: 81 TABLET, COATED ORAL at 08:03

## 2025-04-24 RX ADMIN — FUROSEMIDE 20 MG: 20 TABLET ORAL at 08:03

## 2025-04-24 RX ADMIN — OXYCODONE HYDROCHLORIDE 5 MG: 5 TABLET ORAL at 22:35

## 2025-04-24 RX ADMIN — PIPERACILLIN SODIUM AND TAZOBACTAM SODIUM 3.38 G: 3; .375 INJECTION, SOLUTION INTRAVENOUS at 12:59

## 2025-04-24 RX ADMIN — OXYCODONE HYDROCHLORIDE 5 MG: 5 TABLET ORAL at 15:17

## 2025-04-24 RX ADMIN — CARVEDILOL 25 MG: 25 TABLET, FILM COATED ORAL at 15:17

## 2025-04-24 RX ADMIN — CARVEDILOL 25 MG: 25 TABLET, FILM COATED ORAL at 08:03

## 2025-04-24 RX ADMIN — DOXYCYCLINE HYCLATE 100 MG: 100 CAPSULE ORAL at 22:35

## 2025-04-24 RX ADMIN — TIOTROPIUM BROMIDE INHALATION SPRAY 2 PUFF: 3.12 SPRAY, METERED RESPIRATORY (INHALATION) at 07:23

## 2025-04-24 RX ADMIN — NYSTATIN 1 APPLICATION: 100000 POWDER TOPICAL at 08:03

## 2025-04-24 RX ADMIN — OXYCODONE HYDROCHLORIDE 5 MG: 5 TABLET ORAL at 08:03

## 2025-04-24 RX ADMIN — AMOXICILLIN AND CLAVULANATE POTASSIUM 1 TABLET: 875; 125 TABLET, FILM COATED ORAL at 22:35

## 2025-04-24 RX ADMIN — FLUTICASONE FUROATE AND VILANTEROL TRIFENATATE 1 PUFF: 200; 25 POWDER RESPIRATORY (INHALATION) at 07:23

## 2025-04-24 RX ADMIN — PIPERACILLIN SODIUM AND TAZOBACTAM SODIUM 3.38 G: 3; .375 INJECTION, SOLUTION INTRAVENOUS at 06:04

## 2025-04-24 ASSESSMENT — COGNITIVE AND FUNCTIONAL STATUS - GENERAL
TOILETING: A LITTLE
CLIMB 3 TO 5 STEPS WITH RAILING: A LOT
TURNING FROM BACK TO SIDE WHILE IN FLAT BAD: A LITTLE
MOBILITY SCORE: 16
MOBILITY SCORE: 18
DRESSING REGULAR LOWER BODY CLOTHING: A LITTLE
WALKING IN HOSPITAL ROOM: A LITTLE
MOVING TO AND FROM BED TO CHAIR: A LITTLE
HELP NEEDED FOR BATHING: A LITTLE
PERSONAL GROOMING: A LITTLE
MOVING TO AND FROM BED TO CHAIR: A LITTLE
DAILY ACTIVITIY SCORE: 18
DRESSING REGULAR UPPER BODY CLOTHING: A LITTLE
STANDING UP FROM CHAIR USING ARMS: A LITTLE
TURNING FROM BACK TO SIDE WHILE IN FLAT BAD: A LITTLE
STANDING UP FROM CHAIR USING ARMS: A LITTLE
EATING MEALS: A LITTLE
MOVING FROM LYING ON BACK TO SITTING ON SIDE OF FLAT BED WITH BEDRAILS: A LITTLE
WALKING IN HOSPITAL ROOM: A LITTLE
CLIMB 3 TO 5 STEPS WITH RAILING: TOTAL

## 2025-04-24 ASSESSMENT — PAIN - FUNCTIONAL ASSESSMENT
PAIN_FUNCTIONAL_ASSESSMENT: 0-10

## 2025-04-24 ASSESSMENT — PAIN SCALES - GENERAL
PAINLEVEL_OUTOF10: 3
PAINLEVEL_OUTOF10: 7
PAINLEVEL_OUTOF10: 5 - MODERATE PAIN
PAINLEVEL_OUTOF10: 0 - NO PAIN
PAINLEVEL_OUTOF10: 7
PAINLEVEL_OUTOF10: 4
PAINLEVEL_OUTOF10: 8

## 2025-04-24 NOTE — PROGRESS NOTES
Physical Therapy    Physical Therapy Treatment    Patient Name: Colten Eason  MRN: [id number]  Department: [address]  Room: [address] [place] [address]  Today's Date: 4/24/2025  Time Calculation  Start Time: 1328  Stop Time: 1353  Time Calculation (min): 25 min         Assessment/Plan   PT Assessment  PT Assessment Results: Decreased strength, Decreased range of motion, Decreased endurance, Impaired balance, Decreased mobility, Decreased coordination, Impaired judgement, Decreased safety awareness, Impaired sensation, Decreased skin integrity, Pain  Rehab Prognosis: Good  Barriers to Discharge Home: Physical needs  Physical Needs: Stair navigation into home limited by function/safety, Ambulating household distances limited by function/safety, Intermittent mobility assistance needed, Intermittent ADL assistance needed  Evaluation/Treatment Tolerance: Patient tolerated treatment well  Medical Staff Made Aware: Yes  Barriers to Participation: Comorbidities  End of Session Communication: Bedside nurse  Assessment Comment: pt progressing well; increased gait distance and activity tolerance today. pt issued RW for homegoing.  End of Session Patient Position: Up in chair, Alarm off, caregiver present (call button in reach)  PT Plan  Inpatient/Swing Bed or Outpatient: Inpatient  PT Plan  Treatment/Interventions: Bed mobility, Transfer training, Gait training, Stair training, Balance training, Neuromuscular re-education, Strengthening, Endurance training, Range of motion, Therapeutic activity, Therapeutic exercise, Home exercise program  PT Plan: Ongoing PT  PT Frequency: 4 times per week  PT Discharge Recommendations: Low intensity level of continued care (with intermittent family assist/support)  Equipment Recommended upon Discharge: Wheeled walker  PT Recommended Transfer Status:  (MIN A/CGA)  PT - OK to Discharge: Yes      General Visit Information:   PT  Visit  PT Received On: 04/24/25  General  Reason for Referral: pt is a 65 y/o male  admitted with open wound R LE. + compression wrap; impaired mobility  Referred By: Yenny Higgins MD  Past Medical History Relevant to Rehab: CKD, COPD, Lt ventricle hypertrophy, RLE hematoma  Family/Caregiver Present: Yes  Caregiver Feedback: spouse present and taking the initiative to assist pt with donning socks and shoes.  Prior to Session Communication: Bedside nurse  Patient Position Received: Bed, 2 rail up, Alarm off, caregiver present (pt incontinent of urine upon arrival due to purewick leakage)  General Comment: cleared by nurse for therapy; pt agreeable to therapy.    Subjective   Precautions:  Precautions  Hearing/Visual Limitations: WFL  LE Weight Bearing Status:  (WBAT R LE)  Medical Precautions: Fall precautions  Precautions Comment: dressing intact to RLE wound            Objective   Pain:  Pain Assessment  Pain Assessment:  (2/10 R LE during WB)  Cognition:  Cognition  Overall Cognitive Status: Within Functional Limits  Orientation Level: Oriented X4  Insight: Mild  Coordination:  Coordination Comment: increased time and effort due to pain in RLE    Postural Control:  Postural Control  Posture Comment: forward head; rounded shoulders    Activity Tolerance:  Activity Tolerance  Endurance:  (GOOD-/FAIR+ activity tolerance)      Treatments:  Therapeutic Exercise  Therapeutic Exercise Performed:  (B LE AROM - Ankle pumps; L LE: LAQ and HS x 10 reps.)        Bed Mobility  Bed Mobility:  (supine to sit with MIN A for trunk up and R LE. SBA for scooting. GOOD sitting balance.)    Ambulation/Gait Training  Ambulation/Gait Training Performed:  (pt amb 100' x 1 using rolling walker with CGA. VC given to stay inside RW when maneuvering turns. no balance deficits noted. mild fatigue at end of session)      Transfers  Transfer:  (sit <-> stand with CGA using rolling wallker.)       Other Activity  Other Activity Performed:  (issued rolling walker for homegoing.)    Outcome Measures:  [facility] Basic  Mobility  Turning from your back to your side while in a flat bed without using bedrails: None  Moving from lying on your back to sitting on the side of a flat bed without using bedrails: A little  Moving to and from bed to chair (including a wheelchair): A little  Standing up from a chair using your arms (e.g. wheelchair or bedside chair): A little  To walk in hospital room: A little  Climbing 3-5 steps with railing: A lot  Basic Mobility - Total Score: 18           Encounter Problems       Encounter Problems (Active)       PT Problem       Strength (Progressing)       Start:  04/21/25    Expected End:  05/21/25       The patient will demonstrate an overall strength of 4/5 in BLE to assist with completion of functional mobility.           Functional Mobility (Progressing)       Start:  04/21/25    Expected End:  05/21/25       The patient will complete functional mobility (bed mobility, transfers, etc.) at a mod indep level with LRAD by DC.           Ambulation (Progressing)       Start:  04/21/25    Expected End:  05/21/25       The patient will be able to ambulate at a mod indep level for >50ftx1 with RW.          Steps (Progressing)       Start:  04/21/25    Expected End:  05/21/25       The patient will be able to ascend/descend 4 steps with use of 1 rail at a mod indep level by DC.             Pain - Adult

## 2025-04-24 NOTE — PROGRESS NOTES
Colten Eason is a 64 y.o. male on day 4 of admission presenting with Open wound of right lower extremity, initial encounter.    Subjective   Interval History:    Afebrile, no chills  Reports moderate right leg pain-states he just received pain medication  Denies shortness of breath   Denies nausea, vomiting, diarrhea, abdominal pain      Review of Systems   All other systems reviewed and are negative.      Objective   Range of Vitals (last 24 hours)  Heart Rate:  [67-74]   Temp:  [36.3 °C (97.3 °F)-36.6 °C (97.9 °F)]   Resp:  [18]   BP: (125-157)/(66-83)   SpO2:  [95 %-98 %]   Daily Weight  04/20/25 : 132 kg (291 lb 0.1 oz)    Body mass index is 44.25 kg/m².    Physical Exam  Constitutional:       Appearance: Normal appearance.   HENT:      Head: Normocephalic and atraumatic.      Nose: Nose normal.   Eyes:      General: No scleral icterus.     Extraocular Movements: Extraocular movements intact.      Conjunctiva/sclera: Conjunctivae normal.   Cardiovascular:      Rate and Rhythm: Normal rate and regular rhythm.      Heart sounds: Normal heart sounds.   Pulmonary:      Effort: Pulmonary effort is normal.      Breath sounds: Normal breath sounds.   Abdominal:      General: Bowel sounds are normal.      Palpations: Abdomen is soft.      Tenderness: There is no abdominal tenderness.   Musculoskeletal:      Cervical back: Normal range of motion and neck supple.      Right lower leg: Edema present.      Left lower leg: Edema present.   Skin:     General: Skin is warm and dry.      Comments: Right thigh/knee dressing   Neurological:      Mental Status: He is alert and oriented to person, place, and time.   Psychiatric:         Behavior: Behavior normal.        Antibiotics  piperacillin-tazobactam - 3.375 gram/50 mL  vancomycin - 1.25 gram/250 mL    Relevant Results  Labs  Results from last 72 hours   Lab Units 04/24/25  0541 04/23/25  0615 04/22/25  0638   WBC AUTO x10*3/uL 8.6 8.3 7.6   HEMOGLOBIN g/dL 9.9* 10.0* 9.4*    HEMATOCRIT % 30.6* 31.2* 29.2*   PLATELETS AUTO x10*3/uL 325 295 326   NEUTROS PCT AUTO % 63.9  --   --    LYMPHS PCT AUTO % 19.6  --   --    MONOS PCT AUTO % 8.3  --   --    EOS PCT AUTO % 3.5  --   --      Results from last 72 hours   Lab Units 04/24/25  0541 04/23/25  0615 04/22/25  0638   SODIUM mmol/L 140 139 141   POTASSIUM mmol/L 4.1 4.3 4.2   CHLORIDE mmol/L 110* 111* 113*   CO2 mmol/L 22 22 21   BUN mg/dL 58* 55* 50*   CREATININE mg/dL 2.00*  2.01* 2.05* 1.80*   GLUCOSE mg/dL 104* 106* 100*   CALCIUM mg/dL 8.5* 8.7 8.6   ANION GAP mmol/L 12 10 11   EGFR mL/min/1.73m*2 37*  37* 36* 42*           Estimated Creatinine Clearance: 49.9 mL/min (A) (by C-G formula based on SCr of 2.01 mg/dL (H)).  C-Reactive Protein   Date Value Ref Range Status   04/20/2025 11.16 (H) <1.00 mg/dL Final     Microbiology  Reviewed-blood cultures pending, wound culture negative  Imaging  CT femur right wo IV contrast  Result Date: 4/20/2025  STUDY: CT Extremity; Completed Time:  04/20/2025 at 5:58 PM INDICATION: Right knee/femur wound. Assess for hematoma vs. abscess. COMPARISON: Correlation with CT right knee imaging of same date, 04/20/25. CTA lower extremity 04/09/25. ACCESSION NUMBER(S): [id number] ORDERING CLINICIAN: MATA MENCHACA TECHNIQUE:  Thin section axial images were obtained through the right femur without intravenous contrast.  Orthogonal reconstructed images were obtained and reviewed.  Automated mA/kV exposure control was utilized and patient examination was performed in strict accordance with principles of ALARA. FINDINGS:  There is no acute fracture or malalignment.  There is no cortical erosion or periosteal reaction.  There is joint effusion of the knee.  Femoral acetabular joint alignment is maintained with mild to moderate arthrosis.  Visualized portions of the pelvis are intact. There is distal soft tissue density suggesting hematoma and/or abscess.  There are small locules of gas along the medial aspect of  the knee as described on CT.  There is medial induration, edema and skin thickening.  Cellulitis extends along from the anterior aspect of the posterior medial aspect of the thigh.  There is soft tissue densities in the distal medial soft tissues with dominant density measuring 10.1 x 6.2 x 2.5 cm (image 43 series 12 B and image 221 axial images).  On prior CT, this measured 12.2 x 6.8 x 3.5 cm. Muscle configuration and attenuation normal.  Fascial planes are preserved.. Images through the pelvic viscera are unremarkable.    Distal inflammatory changes and cellulitis extending into the proximal medial thigh. Soft tissue density along the distal medial subcutaneous suggesting hematoma is decreased compared with prior CT.  Superimposed infection is not entirely excluded however there is no gas or drainable fluid component.. No evidence of myositis or acute osteomyelitis. Signed by Dejon Winkler, DO    CT knee right wo IV contrast  Result Date: 4/20/2025  INDICATION:  Right knee wound, assess for hematoma versus abscess. TECHNIQUE:  Thin section axial images were obtained through the right knee without intravenous contrast.  Orthogonal reconstructed images were obtained and reviewed.  Automated mA/kV exposure control was utilized and patient examination was performed in strict accordance with principles of ALARA. RADIATION AMOUNT:  459 mGy-cm. COMPARISON:  MR Right knee 04/13/2025; CT Knee 04/09/2025. FINDINGS:  There is no acute fracture or malalignment.  Tibial plateau is intact.  There is no osteochondral defect.  There is no cortical erosion or periosteal reaction.  Patellofemoral joint alignment is maintained. There is small joint effusion.  There is no intra-articular gas. There is significant induration, edema and inflammatory changes with anterior and medial soft tissue density suggesting abscess extending from the level of the distal femur through the proximal diaphysis of the tibia measuring 7.9 x 2.0 x  10.4 cm with increased attenuation suggesting hematoma.  This appears increased in size compared with prior MRI, however on CT performed 4/9/2025, this is seen measuring 15.9 x 4.2 x 12.0 cm.  There are locules of gas along the medial soft tissues (image 9 through 33 axial images).  There is edema in the anterior compartment of the calf suggesting myositis.  Subcutaneous plantar changes and skin thickening extend in the anterior aspect of the lower extremity.    Anterior large soft tissue density concerning for hematoma which appears increased compared prior MRI however is decreased in size compared with prior CT.  Overall, the appearance suggest hematoma. Superimposed infection is not entirely excluded however there is no locules of gas or focal drainable fluid component. Locules of gas along the medial aspect of the knee suggesting soft tissue infection. Myositis in the anterior compartment of the calf.  Cellulitis is described. No acute fracture or malalignment. Small joint effusion.  No evidence of intra-articular gas or septic arthritis. Signed by Dejon Winkler,     MR knee right wo IV contrast  Result Date: 4/13/2025  Interpreted By:  Ignacio Agosto, STUDY: MRI of the right knee without contrast dated 4/13/2025.   INDICATION: Signs/Symptoms:R quadricep tendon injury   COMPARISON: None.   ACCESSION NUMBER(S): [id number]   ORDERING CLINICIAN: HORTENCIA LEBRON   TECHNIQUE: Multiplanar multisequence MRI of the right knee was performed without intravenous contrast.   FINDINGS: MUSCLES, TENDONS, AND LIGAMENTS:   The anterior cruciate ligament is intact.  The posterior cruciate ligament is intact. The medial collateral ligament is intact. The lateral collateral ligament complex is intact. The popliteus and biceps femoris tendons, iliotibial band, and extensor mechanism are intact.   MENISCI:   The medial meniscus is intact.  The lateral meniscus is intact.   OSSEOUS STRUCTURES AND JOINTS:   No fracture or  dislocation is evident. There is marginal femorotibial and patellofemoral osteophyte formation. As seen on this exam of the hyaline articular cartilage in the femorotibial joint spaces is intact. There is full-thickness fissuring/loss of hyaline articular cartilage of the patellar apex with some foci of mild-to-moderate partial-thickness fissuring of the hyaline articular cartilage of the medial and lateral facets of the patella. There is a moderate volume knee joint effusion.   SOFT TISSUES:   No significant volume of fluid is evident in a popliteal cyst. Extensive reticular increased T2 signal intensity is seen in the subcutaneous tissues. In the subcutaneous tissues of the anterior to anteromedial proximal leg there is a fusiform heterogeneous collection measuring up to approximately 1.5 x 9.3 x 5.8 cm. It is predominantly hypointense on T2 weighted imaging with regions of some higher signal intensity, and heterogeneous with regions of hyperintensity and hypointensity on T1 weighted imaging.       1. Findings most compatible with evolving hematoma in the subcutaneous tissues of the anterior to anteromedial proximal leg. 2. Diffuse reticular increased T2 signal intensity in the subcutaneous tissues which may represent lymphedema and/or cellulitis. 3. As seen on this examination of the quadriceps tendon appears to be intact. If there is concern for compromise of the quadriceps more proximally in the thigh, thigh MRI is recommended. 4. Moderate volume knee joint effusion. 5. Mild-to-moderate degenerative change of the knee.   Signed by: Ignacio Agosto 4/13/2025 1:53 PM Dictation workstation:   [id number]    CT angio lower extremity right w and or wo IV contrast  Result Date: 4/9/2025  STUDY: CT CTA LOWER EXTREMITY RIGHT; 04/09/2025 06:01 PM INDICATION:  Right lower extremity hematoma, evaluate for vessel dissection. COMPARISON: CT Knee 04/09/2025; XR Femur 04/09/2025. ACCESSION NUMBER(S): [id number] ORDERING  CLINICIAN: TECHNIQUE:  Helical CT is performed from the infrarenal aorta through the feet after bolus administration of 120 mL of Omnipaque 350. Images are reviewed and processed at a workstation according to the CT angiogram protocol with 3-D and/or MIP post processing imaging generated.  2236 DICOM images received. Automated mA/kV exposure control was utilized and patient examination was performed in strict accordance with principles of ALARA. FINDINGS: No stenoses are seen within the visualized portions of the right superficial femoral artery, or within the right popliteal artery.  No intimal flap is visualized to indicate a dissection.  There are small contrast blush is seen in the hematomas in the medial aspect of the distal right thigh, and anterior medial aspect of the right knee.  It is indeterminate whether or not there are branches from the profunda femoral artery or the geniculate branches from the popliteal artery are contributing to these hematomas.    1.  Contrast blushing is in the two hematomas located in the medial aspect of the distal right thigh and anteromedial aspect of the right knee, suggestive of an arterial bleed.  It is indeterminate whether or not these are contributed by branches of the profunda femoral artery, or the geniculate arteries. 2.  No stenoses or dissections within the visualized portions of the right superficial femoral artery, or right popliteal artery. This report was called to Dr. Ricardo Cook at 7:40 PM on April 9, 2025. Signed by Nik Hart MD    XR hand right 3+ views  Result Date: 4/9/2025  Interpreted By:  Ludwig Briggs, STUDY: XR HAND RIGHT 3+ VIEWS   INDICATION: Signs/Symptoms:right hand pain.   COMPARISON: None   ACCESSION NUMBER(S): [id number]   ORDERING CLINICIAN: QIAN DELAROSA   FINDINGS: Mild osteoarthritis right hand more advanced at the 1st [facility]. No evidence of fracture or malalignment       Osteoarthritis right hand most prominent at the 1st [facility].   Signed  by: Ludwig Briggs 4/9/2025 5:28 PM Dictation workstation:   [id number]    CT knee right wo IV contrast  Result Date: 4/9/2025  STUDY: CT Extremity; Completed Time:  4/9/2025 3:12 PM INDICATION: Evaluate for soft tissue hematoma. COMPARISON: XR RT femur 4/9/2025. ACCESSION NUMBER(S): [id number] ORDERING CLINICIAN: QIAN DELAROSA TECHNIQUE:  Thin section axial images were obtained through the right knee without intravenous contrast.  Orthogonal reconstructed images were obtained and reviewed.  Automated mA/kV exposure control was utilized and patient examination was performed in strict accordance with principles of ALARA. FINDINGS: No acute fractures or dislocations are visualized within the right knee.  There is no evidence of patellar subluxation.  No suprapatellar effusion is appreciated.  There is prominent soft tissue swelling over the anterior aspect of the knee.  There is a hyperdense fluid collection, indicative of a hematoma.  It is estimated at 14.5 x 9.9 x 5.2 cm.  There is impingement noted upon the vastus medialis of the quadriceps muscle.  However, this does not appear to involve the musculature.  There is a second hematoma over the medial aspect the distal thigh, measured at 11.7 x 8.6 x 2.7 cm.  There is generalized subcutaneous edema over the knee.    1.  Large hematoma over the anteromedial aspect of the right knee. 2.  Additional hematoma over the medial aspect of the distal right thigh. 3.  No acute osseous findings involving the right knee. 4.  No prominent hematoma appreciated within the surrounding musculature.  5.  Subcutaneous edema overlying the right knee. Signed by Nik Hart MD    XR femur right 2+ views  Result Date: 4/9/2025  STUDY: Femur Radiographs; 4/9/2025 12:50 PM INDICATION: Right thigh pain. COMPARISON: None Available. ACCESSION NUMBER(S): [id number] ORDERING CLINICIAN: QIAN DELAROSA TECHNIQUE:  Two view(s) of the right femur (five images). FINDINGS:  There is no displaced  fracture.  The alignment is anatomic.  Narrowing of the right hip joint space.  Prominent soft tissue swelling of the distal thigh and knee.    No acute bone or joint abnormality. Soft tissue swelling. DJD. Signed by Deniz Benson MD    XR knee right 4+ views  Result Date: 4/9/2025  Interpreted By:  Ca Viera, STUDY: XR KNEE RIGHT 4+ VIEWS;  4/9/2025 12:45 pm   INDICATION: Signs/Symptoms:right knee pain.     COMPARISON: None.   ACCESSION NUMBER(S): [id number]   ORDERING CLINICIAN: QIAN DELAROSA   FINDINGS: Five views of the right knee obtained.   No acute fracture or osseous displacement. Mild medial compartment narrowing. Minimal lateral and patellofemoral compartment spurring. Prominent anterior/prepatellar soft tissue swelling with possible subtle nodularity. Faint linear presumed vascular calcifications in the posterior soft tissues       No definite acute osseous finding. Mild degenerative changes. Prominent anterior soft tissue swelling with possible nodularity.   MACRO: None.   Signed by: Ca Viera 4/9/2025 12:56 PM Dictation workstation:   [id number]    CT head wo IV contrast  Result Date: 4/9/2025  Interpreted By:  Adal Landeros, STUDY: CT HEAD WO IV CONTRAST;  4/9/2025 12:42 pm   INDICATION: Signs/Symptoms:fall, hit head.   COMPARISON: None.   ACCESSION NUMBER(S): [id number]   ORDERING CLINICIAN: QIAN DELAROSA   TECHNIQUE: Noncontrast axial CT scan of head was performed. Angled reformats in brain and bone windows were generated. The images were reviewed in bone, brain, blood and soft tissue windows.   FINDINGS: CSF Spaces: The ventricles, sulci and basal cisterns are within normal limits. There is no extraaxial fluid collection.   Parenchyma: Moderate parenchymal atrophy. Periventricular and subcortical white matter hypoattenuation is nonspecific, however likely reflects chronic microvascular ischemic versus chronic hypertensive changes. The grey-white differentiation is intact. There is no mass  effect or midline shift.  There is no intracranial hemorrhage. Minimal density along the periphery of the bifrontal regions (series 4, images 20 9-38) is favored to be secondary to a mixture of motion artifact and localized beam hardening artifact   Calvarium: No acute displaced calvarial fracture. Infiltration of the right frontal scalp suggestive of localized soft tissue injury.   Paranasal sinuses and mastoids: Mastoid air cells appear clear. 2.4 x 2.0 x 2.8 cm rounded cystic lesion demonstrating peripheral calcification in the region peripheral to the fossa of Rosenmuller. Presumed cerumen within the bilateral external auditory canals. Mild mucosal thickening of the bilateral ethmoid and right frontal sinus.       No evidence of acute cortical infarct or intracranial hemorrhage.   2.4 x 2.0 x 2.8 cm rounded cystic lesion containing peripheral calcification in the region peripheral to the fossa of Rosenmuller. Follow-up nonemergent MRI soft tissue neck protocol advised for further assessment.   MACRO: None     Signed by: Adal Landeros 4/9/2025 12:53 PM Dictation workstation:   [id number]      Assessment/Plan   Infected right thigh/knee hematoma  Right thigh hematoma s/p evacuation  Chronic kidney disease stage III     Continue vancomycin  Continue Zosyn  Wound culture finalized no growth  Follow-up blood cultures-pending no growth  Local care  Monitor renal function  Supportive care  Monitor temperature and WBC  Discharge plan is PO doxycycline 100 mg BID and augmentin 875-125 mg PO BID for a total of 14 days through 5/5/25  OK to discharge from ID standpoint    Total time spent caring for the patient today was 25 minutes.  This includes time spent before the visit reviewing the chart, time spent during the visit, and time spent after the visit on documentation.        Laura Monterroso, APRN-CNP

## 2025-04-24 NOTE — CARE PLAN
The patient's goals for the shift include maintain safety, wound care    The clinical goals for the shift include rest      Problem: Pain - Adult  Goal: Verbalizes/displays adequate comfort level or baseline comfort level  Outcome: Progressing     Problem: Safety - Adult  Goal: Free from fall injury  Outcome: Progressing     Problem: Discharge Planning  Goal: Discharge to home or other facility with appropriate resources  Outcome: Progressing     Problem: Chronic Conditions and Co-morbidities  Goal: Patient's chronic conditions and co-morbidity symptoms are monitored and maintained or improved  Outcome: Progressing     Problem: Nutrition  Goal: Nutrient intake appropriate for maintaining nutritional needs  Outcome: Progressing     Problem: Skin  Goal: Decreased wound size/increased tissue granulation at next dressing change  Outcome: Progressing  Flowsheets (Taken 4/24/2025 0757)  Decreased wound size/increased tissue granulation at next dressing change: Utilize specialty bed per algorithm  Goal: Participates in plan/prevention/treatment measures  Outcome: Progressing  Flowsheets (Taken 4/24/2025 0757)  Participates in plan/prevention/treatment measures: Discuss with provider PT/OT consult  Goal: Prevent/manage excess moisture  Outcome: Progressing  Flowsheets (Taken 4/24/2025 0757)  Prevent/manage excess moisture: Use wicking fabric (obtain order)  Goal: Prevent/minimize sheer/friction injuries  Outcome: Progressing  Flowsheets (Taken 4/24/2025 0757)  Prevent/minimize sheer/friction injuries: Use pull sheet  Goal: Promote/optimize nutrition  Outcome: Progressing  Flowsheets (Taken 4/24/2025 0757)  Promote/optimize nutrition: Monitor/record intake including meals  Goal: Promote skin healing  Outcome: Progressing  Flowsheets (Taken 4/24/2025 0757)  Promote skin healing: Turn/reposition every 2 hours/use positioning/transfer devices     Problem: Respiratory  Goal: No signs of respiratory distress (eg. Use of accessory  muscles. Peds grunting)  Outcome: Progressing

## 2025-04-24 NOTE — DISCHARGE SUMMARY
Discharge Diagnosis  Open wound of right lower extremity, initial encounter     Issues Requiring Follow-Up  Follow-up PCP 1 to 2 weeks--referral placed  Continue rehabilitation with home health care    Discharge Meds     Medication List      START taking these medications     amoxicillin-clavulanate 875-125 mg tablet; Commonly known as: Augmentin;   Take 1 tablet by mouth 2 times a day for 14 days.   doxycycline 100 mg capsule; Commonly known as: Vibramycin; Take 1   capsule (100 mg) by mouth 2 times a day for 14 days. Take with at least 8   ounces (large glass) of water, do not lie down for 30 minutes after   nystatin 100,000 unit/gram powder; Commonly known as: Mycostatin; Apply   1 Application topically 2 times a day.     CHANGE how you take these medications     oxyCODONE 5 mg immediate release tablet; Commonly known as: Roxicodone;   Take 1 tablet (5 mg) by mouth every 8 hours if needed for severe pain (7 -   10) for up to 5 days.; What changed: when to take this     CONTINUE taking these medications     albuterol 90 mcg/actuation inhaler; Inhale 2 puffs every 6 hours if   needed for wheezing or shortness of breath.   aspirin 81 mg EC tablet   SingleFeedztri Aerosphere 160-9-4.8 mcg/actuation HFA aerosol inhaler; Generic   drug: budesonide-glycopyr-formoterol   carvedilol 25 mg tablet; Commonly known as: Coreg   furosemide 40 mg tablet; Commonly known as: Lasix; Take 0.5 tablets (20   mg) by mouth once daily.   polyethylene glycol 17 gram packet; Commonly known as: Glycolax,   Miralax; Take 17 g by mouth once daily.       Test Results Pending At Discharge  Pending Labs       Order Current Status    Blood Culture Preliminary result    Blood Culture Preliminary result            Hospital Course  64-year-old male, admitted for 4/20/25 for right lower extremity wound--s/p right thigh hematoma from mechanical fall on a prior admit.     On this admission, patient with negative blood cultures.  Negative wound cultures.   Evaluated by vascular surgery and ID.  Received ABX per ID.  Wound care per vascular surgery.  PT OT recommending moderate intensity rehabilitation needs on discharge but patient declining; in favor of going home with home health care.  Patient will discharge with p.o. antibiotics, walker as requested.    Pertinent Physical Exam At Time of Discharge  Physical Exam  Vitals and nursing note reviewed.   Constitutional:       Appearance: Normal appearance.   HENT:      Head: Normocephalic and atraumatic.      Nose: Nose normal.      Mouth/Throat:      Mouth: Mucous membranes are moist.   Eyes:      Extraocular Movements: Extraocular movements intact.      Conjunctiva/sclera: Conjunctivae normal.   Cardiovascular:      Rate and Rhythm: Normal rate.      Pulses: Normal pulses.   Pulmonary:      Effort: Pulmonary effort is normal.      Breath sounds: Normal breath sounds.   Abdominal:      General: Bowel sounds are normal.      Palpations: Abdomen is soft.   Musculoskeletal:         General: Normal range of motion.      Cervical back: Normal range of motion and neck supple.   Skin:     General: Skin is warm and dry.      Capillary Refill: Capillary refill takes less than 2 seconds.      Comments: RLE dressing clean dry intact   Neurological:      Mental Status: He is alert and oriented to person, place, and time.   Psychiatric:         Mood and Affect: Mood normal.         Behavior: Behavior normal.         Outpatient Follow-Up  Future Appointments   Date Time Provider Department Center   5/7/2025 10:40 AM Mook Tao MD [id number] [facility]     Time spent on discharge 55 minutes    Kimber Agrawal, MATTHEW-CNP

## 2025-04-24 NOTE — PROGRESS NOTES
Occupational Therapy                 Therapy Communication Note    Patient Name: Colten Eason  MRN: [id number]  Department: [address]  Room: 429/429-B  Today's Date: 4/24/2025     Discipline: Occupational Therapy    OT Missed Visit: Yes     Missed Visit Reason: Missed Visit Reason: Patient refused (Pt declined participation in OT session, education and encouragement provided however pt continued to decline.)    Missed Time: Attempt at 0934    Comment:

## 2025-04-24 NOTE — DISCHARGE INSTRUCTIONS
Follow-up PCP 1 to 2 weeks--referral to establish care placed    Do not drive or operate any heavy machinery while on narcotic medication.

## 2025-04-24 NOTE — CARE PLAN
The patient's goals for the shift include maintain safety, wound care    The clinical goals for the shift include safe mobility      Problem: Pain - Adult  Goal: Verbalizes/displays adequate comfort level or baseline comfort level  Outcome: Progressing     Problem: Safety - Adult  Goal: Free from fall injury  Outcome: Progressing     Problem: Discharge Planning  Goal: Discharge to home or other facility with appropriate resources  Outcome: Progressing     Problem: Chronic Conditions and Co-morbidities  Goal: Patient's chronic conditions and co-morbidity symptoms are monitored and maintained or improved  Outcome: Progressing     Problem: Nutrition  Goal: Nutrient intake appropriate for maintaining nutritional needs  Outcome: Progressing     Problem: Skin  Goal: Decreased wound size/increased tissue granulation at next dressing change  Outcome: Progressing  Flowsheets (Taken 4/23/2025 0953 by Monse Li RN)  Decreased wound size/increased tissue granulation at next dressing change: Promote sleep for wound healing  Goal: Participates in plan/prevention/treatment measures  Outcome: Progressing  Flowsheets (Taken 4/24/2025 0529)  Participates in plan/prevention/treatment measures:   Discuss with provider PT/OT consult   Elevate heels   Increase activity/out of bed for meals  Goal: Prevent/manage excess moisture  Outcome: Progressing  Flowsheets (Taken 4/24/2025 0529)  Prevent/manage excess moisture:   Cleanse incontinence/protect with barrier cream   Moisturize dry skin   Follow provider orders for dressing changes  Goal: Prevent/minimize sheer/friction injuries  Outcome: Progressing  Flowsheets (Taken 4/24/2025 0529)  Prevent/minimize sheer/friction injuries:   Turn/reposition every 2 hours/use positioning/transfer devices   Utilize specialty bed per algorithm   Use pull sheet   Increase activity/out of bed for meals  Goal: Promote/optimize nutrition  Outcome: Progressing  Flowsheets (Taken 4/24/2025  7229)  Promote/optimize nutrition:   Consume > 50% meals/supplements   Monitor/record intake including meals  Goal: Promote skin healing  Outcome: Progressing  Flowsheets (Taken 4/24/2025 1131)  Promote skin healing:   Assess skin/pad under line(s)/device(s)   Ensure correct size (line/device) and apply per  instructions   Rotate device position/do not position patient on device     Problem: Respiratory  Goal: No signs of respiratory distress (eg. Use of accessory muscles. Peds grunting)  Outcome: Progressing

## 2025-04-24 NOTE — PROGRESS NOTES
Vancomycin Dosing by Pharmacy- Cessation of Therapy    Consult to pharmacy for vancomycin dosing has been discontinued by the prescriber, pharmacy will sign off at this time.    Please call pharmacy if there are further questions or re-enter a consult if vancomycin is resumed.     Madeleine Weinstein, PharmD

## 2025-04-24 NOTE — PROGRESS NOTES
04/24/25 1247   Discharge Planning   Expected Discharge Disposition Home H  ([facility])     C9 completed and sent to [facility]   Awaiting approval     1530: [facility] Spoke to tia clarified patients needs states should have an approval by tomorrow     Will need internal referral for home health upon discharge  ** do not discharge without speaking to care coordination**

## 2025-04-24 NOTE — PROGRESS NOTES
Vancomycin Dosing by Pharmacy- FOLLOW UP    Colten Eason is a 64 y.o. year old male who Pharmacy has been consulted for vancomycin dosing for cellulitis, skin and soft tissue. Based on the patient's indication and renal status this patient is being dosed based on a goal AUC of 400-600.     Renal function is currently stable.    Current vancomycin dose: 1250 mg given every 24 hours    Estimated vancomycin AUC on current dose: 501 mg/L.hr     Visit Vitals  /77 (BP Location: Left arm, Patient Position: Lying)   Pulse 74   Temp 36.6 °C (97.9 °F) (Oral)   Resp 18        Lab Results   Component Value Date    CREATININE 2.01 (H) 2025    CREATININE 2.05 (H) 2025    CREATININE 1.80 (H) 2025    CREATININE 1.88 (H) 2025        Patient weight is as follows:   Vitals:    25 1648   Weight: 132 kg (291 lb 0.1 oz)       Cultures:  No results found for the encounter in last 14 days.       I/O last 3 completed shifts:  In:  (15.6 mL/kg) [P.O.:706; IV Piggyback:1350]  Out: 4050 (30.7 mL/kg) [Urine:4050 (0.9 mL/kg/hr)]  Weight: 132 kg   I/O during current shift:  No intake/output data recorded.    Temp (24hrs), Av.4 °C (97.6 °F), Min:36.3 °C (97.3 °F), Max:36.6 °C (97.9 °F)      Assessment/Plan    Within goal AUC range. Continue current vancomycin regimen.    This dosing regimen is predicted by [employer] to result in the following pharmacokinetic parameters:  Loading dose: N/A  Regimen: 1250 mg IV every 24 hours.  Start time: 21:07 on 2025  Exposure target: AUC24 (range)400-600 mg/L.hr   [id number]: 498 mg/L.hr  AUC24,ss: 501 mg/L.hr  Probability of AUC24 > 400: 99 %  Ctrough,ss: 14.1 mg/L  Probability of Ctrough,ss > 20: 2 %      The next level will be obtained on 4/28 at 0500. May be obtained sooner if clinically indicated.   Will continue to monitor renal function daily while on vancomycin and order serum creatinine at least every 48 hours if not already ordered.  Follow for  continued vancomycin needs, clinical response, and signs/symptoms of toxicity.       OSKAR HOGAN

## 2025-04-25 ENCOUNTER — DOCUMENTATION (OUTPATIENT)
Dept: HOME HEALTH SERVICES | Facility: HOME HEALTH | Age: 65
End: 2025-04-25
Payer: COMMERCIAL

## 2025-04-25 ENCOUNTER — PHARMACY VISIT (OUTPATIENT)
Dept: PHARMACY | Facility: CLINIC | Age: 65
End: 2025-04-25
Payer: COMMERCIAL

## 2025-04-25 VITALS
WEIGHT: 291.01 LBS | OXYGEN SATURATION: 97 % | SYSTOLIC BLOOD PRESSURE: 139 MMHG | TEMPERATURE: 97.9 F | BODY MASS INDEX: 44.1 KG/M2 | HEART RATE: 69 BPM | HEIGHT: 68 IN | DIASTOLIC BLOOD PRESSURE: 71 MMHG | RESPIRATION RATE: 18 BRPM

## 2025-04-25 LAB
ANION GAP SERPL CALCULATED.3IONS-SCNC: 12 MMOL/L (ref 10–20)
BASOPHILS # BLD AUTO: 0.1 X10*3/UL (ref 0–0.1)
BASOPHILS NFR BLD AUTO: 1.2 %
BUN SERPL-MCNC: 50 MG/DL (ref 6–23)
CALCIUM SERPL-MCNC: 8.6 MG/DL (ref 8.6–10.3)
CHLORIDE SERPL-SCNC: 108 MMOL/L (ref 98–107)
CO2 SERPL-SCNC: 24 MMOL/L (ref 21–32)
CREAT SERPL-MCNC: 1.82 MG/DL (ref 0.5–1.3)
EGFRCR SERPLBLD CKD-EPI 2021: 41 ML/MIN/1.73M*2
EOSINOPHIL # BLD AUTO: 0.3 X10*3/UL (ref 0–0.7)
EOSINOPHIL NFR BLD AUTO: 3.7 %
ERYTHROCYTE [DISTWIDTH] IN BLOOD BY AUTOMATED COUNT: 14.5 % (ref 11.5–14.5)
GLUCOSE SERPL-MCNC: 102 MG/DL (ref 74–99)
HCT VFR BLD AUTO: 31.4 % (ref 41–52)
HGB BLD-MCNC: 10.1 G/DL (ref 13.5–17.5)
IMM GRANULOCYTES # BLD AUTO: 0.32 X10*3/UL (ref 0–0.7)
IMM GRANULOCYTES NFR BLD AUTO: 4 % (ref 0–0.9)
LYMPHOCYTES # BLD AUTO: 1.55 X10*3/UL (ref 1.2–4.8)
LYMPHOCYTES NFR BLD AUTO: 19.2 %
MCH RBC QN AUTO: 29.3 PG (ref 26–34)
MCHC RBC AUTO-ENTMCNC: 32.2 G/DL (ref 32–36)
MCV RBC AUTO: 91 FL (ref 80–100)
MONOCYTES # BLD AUTO: 0.62 X10*3/UL (ref 0.1–1)
MONOCYTES NFR BLD AUTO: 7.7 %
NEUTROPHILS # BLD AUTO: 5.19 X10*3/UL (ref 1.2–7.7)
NEUTROPHILS NFR BLD AUTO: 64.2 %
NRBC BLD-RTO: 0 /100 WBCS (ref 0–0)
PLATELET # BLD AUTO: 318 X10*3/UL (ref 150–450)
POTASSIUM SERPL-SCNC: 4.2 MMOL/L (ref 3.5–5.3)
RBC # BLD AUTO: 3.45 X10*6/UL (ref 4.5–5.9)
SODIUM SERPL-SCNC: 140 MMOL/L (ref 136–145)
WBC # BLD AUTO: 8.1 X10*3/UL (ref 4.4–11.3)

## 2025-04-25 PROCEDURE — 36415 COLL VENOUS BLD VENIPUNCTURE: CPT

## 2025-04-25 PROCEDURE — 80048 BASIC METABOLIC PNL TOTAL CA: CPT

## 2025-04-25 PROCEDURE — 85025 COMPLETE CBC W/AUTO DIFF WBC: CPT

## 2025-04-25 PROCEDURE — 2500000001 HC RX 250 WO HCPCS SELF ADMINISTERED DRUGS (ALT 637 FOR MEDICARE OP): Performed by: INTERNAL MEDICINE

## 2025-04-25 PROCEDURE — 94640 AIRWAY INHALATION TREATMENT: CPT

## 2025-04-25 RX ADMIN — AMOXICILLIN AND CLAVULANATE POTASSIUM 1 TABLET: 875; 125 TABLET, FILM COATED ORAL at 09:15

## 2025-04-25 RX ADMIN — NYSTATIN 1 APPLICATION: 100000 POWDER TOPICAL at 09:15

## 2025-04-25 RX ADMIN — OXYCODONE HYDROCHLORIDE 5 MG: 5 TABLET ORAL at 09:14

## 2025-04-25 RX ADMIN — FLUTICASONE FUROATE AND VILANTEROL TRIFENATATE 1 PUFF: 200; 25 POWDER RESPIRATORY (INHALATION) at 08:06

## 2025-04-25 RX ADMIN — DOXYCYCLINE HYCLATE 100 MG: 100 CAPSULE ORAL at 09:15

## 2025-04-25 RX ADMIN — ASPIRIN 81 MG: 81 TABLET, COATED ORAL at 09:15

## 2025-04-25 RX ADMIN — CARVEDILOL 25 MG: 25 TABLET, FILM COATED ORAL at 09:15

## 2025-04-25 RX ADMIN — TIOTROPIUM BROMIDE INHALATION SPRAY 2 PUFF: 3.12 SPRAY, METERED RESPIRATORY (INHALATION) at 08:06

## 2025-04-25 RX ADMIN — FUROSEMIDE 20 MG: 20 TABLET ORAL at 09:15

## 2025-04-25 ASSESSMENT — PAIN SCALES - GENERAL
PAINLEVEL_OUTOF10: 0 - NO PAIN
PAINLEVEL_OUTOF10: 7

## 2025-04-25 ASSESSMENT — COGNITIVE AND FUNCTIONAL STATUS - GENERAL
DRESSING REGULAR LOWER BODY CLOTHING: A LITTLE
MOVING FROM LYING ON BACK TO SITTING ON SIDE OF FLAT BED WITH BEDRAILS: A LITTLE
DRESSING REGULAR UPPER BODY CLOTHING: A LITTLE
HELP NEEDED FOR BATHING: A LITTLE
MOVING FROM LYING ON BACK TO SITTING ON SIDE OF FLAT BED WITH BEDRAILS: A LITTLE
MOBILITY SCORE: 16
STANDING UP FROM CHAIR USING ARMS: A LITTLE
CLIMB 3 TO 5 STEPS WITH RAILING: TOTAL
EATING MEALS: A LITTLE
MOBILITY SCORE: 16
EATING MEALS: A LITTLE
PERSONAL GROOMING: A LITTLE
TOILETING: A LITTLE
STANDING UP FROM CHAIR USING ARMS: A LITTLE
WALKING IN HOSPITAL ROOM: A LITTLE
TURNING FROM BACK TO SIDE WHILE IN FLAT BAD: A LITTLE
DAILY ACTIVITIY SCORE: 18
MOVING TO AND FROM BED TO CHAIR: A LITTLE
CLIMB 3 TO 5 STEPS WITH RAILING: TOTAL
DRESSING REGULAR LOWER BODY CLOTHING: A LITTLE
TURNING FROM BACK TO SIDE WHILE IN FLAT BAD: A LITTLE
MOVING TO AND FROM BED TO CHAIR: A LITTLE
WALKING IN HOSPITAL ROOM: A LITTLE
DRESSING REGULAR UPPER BODY CLOTHING: A LITTLE
PERSONAL GROOMING: A LITTLE
HELP NEEDED FOR BATHING: A LITTLE
TOILETING: A LITTLE
DAILY ACTIVITIY SCORE: 18

## 2025-04-25 ASSESSMENT — PAIN DESCRIPTION - DESCRIPTORS
DESCRIPTORS: ACHING;RADIATING
DESCRIPTORS: ACHING;RADIATING

## 2025-04-25 ASSESSMENT — PAIN - FUNCTIONAL ASSESSMENT
PAIN_FUNCTIONAL_ASSESSMENT: 0-10
PAIN_FUNCTIONAL_ASSESSMENT: 0-10

## 2025-04-25 ASSESSMENT — PAIN DESCRIPTION - LOCATION: LOCATION: LEG

## 2025-04-25 ASSESSMENT — PAIN DESCRIPTION - ORIENTATION: ORIENTATION: RIGHT

## 2025-04-25 NOTE — PROGRESS NOTES
Occupational Therapy                 Therapy Communication Note    Patient Name: Colten Eason  MRN: [id number]  Department: [address]  Room: 429/429-B  Today's Date: 4/25/2025     Discipline: Occupational Therapy    OT Missed Visit: Yes     Missed Visit Reason: Missed Visit Reason: Patient refused (Patient and spouse declined participation in OT session at this time. Education provided to address OT questions, comments and concerns. Patient anticipating discharge later this date.)    Missed Time: Attempt at 1011    Comment:

## 2025-04-25 NOTE — HH CARE COORDINATION
Home Care received a Referral for Nursing, Physical Therapy, Occupational Therapy, and Home Health Aide. We have processed the referral for a Start of Care on 04/26-04/27.     If you have any questions or concerns, please feel free to contact us at [phone]. Follow the prompts, enter your five digit zip code, and you will be directed to your care team on [facility].

## 2025-04-25 NOTE — SIGNIFICANT EVENT
Patient seen with active discharge order. Denies acute events overnight. Denies pain or shortness of breath. Well appearing. Wife bedside and updated on POC. Workmans comp paperwork complete. Medically stable for discharge.

## 2025-04-25 NOTE — PROGRESS NOTES
04/25/25 1237   Discharge Planning   Expected Discharge Disposition Home H  ([facility] SOC 24-48hrs)     Patient updated, spouse to transport     NO BARRIERS TO DISCHARGE FROM CARE TRANSITIONS

## 2025-04-25 NOTE — CARE PLAN
The patient's goals for the shift include maintain safety, wound care    The clinical goals for the shift include patient will remain hds      Problem: Pain - Adult  Goal: Verbalizes/displays adequate comfort level or baseline comfort level  Outcome: Progressing     Problem: Safety - Adult  Goal: Free from fall injury  Outcome: Progressing     Problem: Discharge Planning  Goal: Discharge to home or other facility with appropriate resources  Outcome: Progressing     Problem: Chronic Conditions and Co-morbidities  Goal: Patient's chronic conditions and co-morbidity symptoms are monitored and maintained or improved  Outcome: Progressing     Problem: Nutrition  Goal: Nutrient intake appropriate for maintaining nutritional needs  Outcome: Progressing     Problem: Skin  Goal: Decreased wound size/increased tissue granulation at next dressing change  Outcome: Progressing  Goal: Participates in plan/prevention/treatment measures  Outcome: Progressing  Goal: Prevent/manage excess moisture  Outcome: Progressing  Goal: Prevent/minimize sheer/friction injuries  Outcome: Progressing  Goal: Promote/optimize nutrition  Outcome: Progressing  Goal: Promote skin healing  Outcome: Progressing     Problem: Respiratory  Goal: No signs of respiratory distress (eg. Use of accessory muscles. Peds grunting)  Outcome: Progressing

## 2025-04-25 NOTE — CARE PLAN
The patient's goals for the shift include maintain safety, wound care    The clinical goals for the shift include patient will remain hds      Problem: Pain - Adult  Goal: Verbalizes/displays adequate comfort level or baseline comfort level  Outcome: Progressing     Problem: Safety - Adult  Goal: Free from fall injury  Outcome: Progressing     Problem: Discharge Planning  Goal: Discharge to home or other facility with appropriate resources  Outcome: Progressing     Problem: Chronic Conditions and Co-morbidities  Goal: Patient's chronic conditions and co-morbidity symptoms are monitored and maintained or improved  Outcome: Progressing     Problem: Nutrition  Goal: Nutrient intake appropriate for maintaining nutritional needs  Outcome: Progressing     Problem: Skin  Goal: Decreased wound size/increased tissue granulation at next dressing change  Outcome: Progressing  Goal: Participates in plan/prevention/treatment measures  Outcome: Progressing  Goal: Prevent/manage excess moisture  Outcome: Progressing  Goal: Prevent/minimize sheer/friction injuries  Outcome: Progressing  Goal: Promote/optimize nutrition  Outcome: Progressing  Goal: Promote skin healing  Outcome: Progressing     Problem: Respiratory  Goal: No signs of respiratory distress (eg. Use of accessory muscles. Peds grunting)  Outcome: Progressing     Problem: Pain  Goal: Takes deep breaths with improved pain control throughout the shift  Outcome: Progressing  Goal: Turns in bed with improved pain control throughout the shift  Outcome: Progressing  Goal: Walks with improved pain control throughout the shift  Outcome: Progressing  Goal: Performs ADL's with improved pain control throughout shift  Outcome: Progressing  Goal: Participates in PT with improved pain control throughout the shift  Outcome: Progressing  Goal: Free from opioid side effects throughout the shift  Outcome: Progressing  Goal: Free from acute confusion related to pain meds throughout the  shift  Outcome: Progressing

## 2025-04-25 NOTE — NURSING NOTE
Reviewed discharge with patient and spouse at bedside, Reviewed follow up appointments, new medications and next doses. Patient and spouse made aware of driving restrictions due to narcotics, along with care when using nystatin powder. All questions answered. New medications brought up to the bedside

## 2025-04-27 ENCOUNTER — PATIENT OUTREACH (OUTPATIENT)
Dept: CARE COORDINATION | Age: 65
End: 2025-04-27
Payer: COMMERCIAL

## 2025-04-27 ENCOUNTER — HOME CARE VISIT (OUTPATIENT)
Dept: HOME HEALTH SERVICES | Facility: HOME HEALTH | Age: 65
End: 2025-04-27
Payer: COMMERCIAL

## 2025-04-27 VITALS
TEMPERATURE: 99 F | OXYGEN SATURATION: 95 % | HEART RATE: 66 BPM | RESPIRATION RATE: 22 BRPM | SYSTOLIC BLOOD PRESSURE: 122 MMHG | DIASTOLIC BLOOD PRESSURE: 78 MMHG

## 2025-04-27 PROCEDURE — 0023 HH SOC

## 2025-04-27 PROCEDURE — G0299 HHS/HOSPICE OF RN EA 15 MIN: HCPCS

## 2025-04-27 ASSESSMENT — LIFESTYLE VARIABLES: SMOKING_STATUS: 0

## 2025-04-27 ASSESSMENT — ENCOUNTER SYMPTOMS
PAIN: 1
SHORTNESS OF BREATH: 1
LAST BOWEL MOVEMENT: 67322
DYSPNEA ACTIVITY LEVEL: AFTER AMBULATING MORE THAN 20 FT
LOWEST PAIN SEVERITY IN PAST 24 HOURS: 4/10
LOWER EXTREMITY EDEMA: 1
DIARRHEA: 1
PERSON REPORTING PAIN: PATIENT
APPETITE LEVEL: GOOD
HIGHEST PAIN SEVERITY IN PAST 24 HOURS: 8/10

## 2025-04-27 ASSESSMENT — ACTIVITIES OF DAILY LIVING (ADL)
OASIS_M1830: 03
ENTERING_EXITING_HOME: DEPENDENT

## 2025-04-27 NOTE — HOME HEALTH
pt had fall about 3 weeks ago. resulted in needing hematoma removed. pt went to snf, incision got infected, and was sent back to the hospital. was dced from hospital to home 2 days ago. no reported falls since being home. ambulates with walker currently, was independent previously. is currently staying w/ dtr, basement has no stairs from garage and has bathroom. wife was educated on how to preform wound care. does have some supplies, will order more. wound care completed according to md order. pt tolerated well. pictures and measurements attatched in chart. wife agrees to change daily. sees vascular dr on wednesday. pain is pretty bad to the touch, gets up to an 8. does go down with pain treatment and positioning. is passing bms normally, last bm passed today. is having diarrhea, believed to be caused by oral abx. urinating well. does have sob w/ exertion, does have copd. sleeps with cpap, no oxygen use. PT and OT eval to follow. would like to revisit the idea of an aide after meeting w/ PT. vitals wnl. no other concerns. will be seen by nursing for a few weeks.

## 2025-05-07 ENCOUNTER — APPOINTMENT (OUTPATIENT)
Dept: VASCULAR SURGERY | Facility: CLINIC | Age: 65
End: 2025-05-07
Payer: COMMERCIAL

## 2025-05-09 NOTE — DOCUMENTATION CLARIFICATION NOTE
"    PATIENT:               WILBER GARCÍA  ACCT #:                  [id number]  MRN:                       [id number]  :                       1960  ADMIT DATE:       2025 11:09 AM  DISCH DATE:        2025 1:40 PM  RESPONDING PROVIDER #:        [id number]          PROVIDER RESPONSE TEXT:    Evacuation of Hematoma of Right Knee    CDI QUERY TEXT:    Clarification      Instruction:    Based on your assessment of the patient and the clinical information, please provide the requested documentation by clicking on the appropriate radio button and enter any additional information if prompted.    Question: In reviewing the operative note for this patient, certain crucial elements for coding are absent for hematoma evacuation.      When answering this query, please exercise your independent professional judgment. The fact that a question is being asked, does not imply that any particular answer is desired or expected.    The patient's clinical indicators include:  Clinical Information:  H/P 4/10/25 by MATTHEW Fung-CNP:    \"64 y.o. male presenting with a past medical history of COPD, CKD.  Patient was outside yesterday chasing a goose subsequently fell.  He states that when he fell he landed on his right knee.\"    This query refers to the procedure performed on 4/10/25    Operative Report 4/10/25 by Dr Mook Tao:    \"A vertical incision was made just medial to the hematoma and compromised skin.  This was deepened through the subcutaneous tissue until the hematoma cavity was entered.  Old blood and hematoma was then evacuated. \"  Options provided:  -- Evacuation of Hematoma of Right Hip  -- Evacuation of Hematoma of Right Knee  -- Evacuation of Hematoma of Right Hip and Right Knee  -- Other - I will add my own diagnosis  -- Refer to Clinical Documentation Reviewer    Query created by: Neena Telles on 2025 4:06 PM      Electronically signed by:  MOOK TAO MD 2025 1:47 PM          "

## (undated) DEVICE — ELECTRODE, ELECTROSURGICAL, BLADE, INSULATED, ENT/IMA, STERILE

## (undated) DEVICE — Device

## (undated) DEVICE — TUBING, SUCTION, 6MM X 10, CLEAN N-COND

## (undated) DEVICE — SOLUTION, IRRIGATION, X RX SODIUM CHL 0.9%, 1000ML BTL

## (undated) DEVICE — BANDAGE, ELASTIC, MATRIX, SELF-CLOSURE, 6 IN X 5 YD, LF

## (undated) DEVICE — SUTURE, ETHILON, 2-0, 18 IN, FS, BLACK, BX/36

## (undated) DEVICE — EVACUATOR, WOUND, SUCTION, CLOSED, JACKSON-PRATT, 100 CC, SILICONE

## (undated) DEVICE — DRESSING, ABDOMINAL, WET PRUF, TENDERSORB, 5 X 9 IN, STERILE

## (undated) DEVICE — DRAIN, CHANNEL, HUBLESS, ROUND, FULL FLUTE, 19FR

## (undated) DEVICE — SUTURE, ETHILON, 3-0, 18 IN, PS1, BLACK

## (undated) DEVICE — DRESSING, GAUZE, PETROLATUM, XEROFORM, 4 X 4, PEELABLE FOIL

## (undated) DEVICE — DRAPE, SHEET, LARGE, 70 X 85IN, STERILE

## (undated) DEVICE — PENCIL, ELECTROSURG, W/BUTTON SWITCH & HOLSTER, EZ CLEAN, DISP

## (undated) DEVICE — SPONGE, GAUZE, AVANT, STERILE, NONWOVEN, 4PLY, 4 X 4, STANDARD

## (undated) DEVICE — BANDAGE, GAUZE, COTTON, STERILE, BULKEE II, 4.5IN X 4.1YD

## (undated) DEVICE — DRAPE, ORTHOMAX UP, 108 X 90IN, STERILE

## (undated) DEVICE — BANDAGE, ELASTIC, ACE, ACE, DOUBLE LENGTH, 6 X 550 IN, LF

## (undated) DEVICE — COVER, LIGHT HANDLE, FLEX, SOFT, PK/1

## (undated) DEVICE — TIP, SUCTION, YANKAUER, FLEXIBLE